# Patient Record
Sex: FEMALE | Race: WHITE | NOT HISPANIC OR LATINO | Employment: OTHER | URBAN - METROPOLITAN AREA
[De-identification: names, ages, dates, MRNs, and addresses within clinical notes are randomized per-mention and may not be internally consistent; named-entity substitution may affect disease eponyms.]

---

## 2017-01-03 ENCOUNTER — ALLSCRIPTS OFFICE VISIT (OUTPATIENT)
Dept: OTHER | Facility: OTHER | Age: 42
End: 2017-01-03

## 2017-01-13 ENCOUNTER — APPOINTMENT (OUTPATIENT)
Dept: OCCUPATIONAL THERAPY | Age: 42
End: 2017-01-13
Payer: MEDICARE

## 2017-01-13 PROCEDURE — 97010 HOT OR COLD PACKS THERAPY: CPT

## 2017-01-13 PROCEDURE — 97140 MANUAL THERAPY 1/> REGIONS: CPT

## 2017-01-13 PROCEDURE — G8990 OTHER PT/OT CURRENT STATUS: HCPCS

## 2017-01-13 PROCEDURE — G8991 OTHER PT/OT GOAL STATUS: HCPCS

## 2017-01-13 PROCEDURE — 97110 THERAPEUTIC EXERCISES: CPT

## 2017-01-16 ENCOUNTER — APPOINTMENT (OUTPATIENT)
Dept: OCCUPATIONAL THERAPY | Age: 42
End: 2017-01-16
Payer: MEDICARE

## 2017-01-17 ENCOUNTER — APPOINTMENT (OUTPATIENT)
Dept: OCCUPATIONAL THERAPY | Age: 42
End: 2017-01-17
Payer: MEDICARE

## 2017-01-18 ENCOUNTER — TRANSCRIBE ORDERS (OUTPATIENT)
Dept: LAB | Facility: HOSPITAL | Age: 42
End: 2017-01-18

## 2017-01-18 ENCOUNTER — HOSPITAL ENCOUNTER (OUTPATIENT)
Dept: RADIOLOGY | Facility: HOSPITAL | Age: 42
Discharge: HOME | End: 2017-01-18
Attending: ORTHOPAEDIC SURGERY
Payer: MEDICARE

## 2017-01-18 ENCOUNTER — ALLSCRIPTS OFFICE VISIT (OUTPATIENT)
Dept: OTHER | Facility: OTHER | Age: 42
End: 2017-01-18

## 2017-01-18 ENCOUNTER — APPOINTMENT (OUTPATIENT)
Dept: LAB | Facility: HOSPITAL | Age: 42
End: 2017-01-18
Payer: MEDICARE

## 2017-01-18 DIAGNOSIS — S62.629A CLOSED DISPLACED FRACTURE OF MIDDLE PHALANX OF FINGER: ICD-10-CM

## 2017-01-18 DIAGNOSIS — S62.615A: ICD-10-CM

## 2017-01-18 LAB
25(OH)D3 SERPL-MCNC: 31.6 NG/ML (ref 30–100)
ALBUMIN SERPL BCP-MCNC: 4.2 G/DL (ref 3.5–5)
ALP SERPL-CCNC: 63 U/L (ref 46–116)
ALT SERPL W P-5'-P-CCNC: 25 U/L (ref 12–78)
ANION GAP SERPL CALCULATED.3IONS-SCNC: 6 MMOL/L (ref 4–13)
AST SERPL W P-5'-P-CCNC: 16 U/L (ref 5–45)
BILIRUB SERPL-MCNC: 0.35 MG/DL (ref 0.2–1)
BUN SERPL-MCNC: 13 MG/DL (ref 5–25)
CALCIUM SERPL-MCNC: 9.7 MG/DL (ref 8.3–10.1)
CHLORIDE SERPL-SCNC: 104 MMOL/L (ref 100–108)
CO2 SERPL-SCNC: 32 MMOL/L (ref 21–32)
CREAT SERPL-MCNC: 1 MG/DL (ref 0.6–1.3)
GFR SERPL CREATININE-BSD FRML MDRD: >60 ML/MIN/1.73SQ M
GLUCOSE SERPL-MCNC: 100 MG/DL (ref 65–140)
POTASSIUM SERPL-SCNC: 3.8 MMOL/L (ref 3.5–5.3)
PROT SERPL-MCNC: 8 G/DL (ref 6.4–8.2)
SODIUM SERPL-SCNC: 142 MMOL/L (ref 136–145)
TSH SERPL DL<=0.05 MIU/L-ACNC: 1.66 UIU/ML (ref 0.36–3.74)

## 2017-01-18 PROCEDURE — 80053 COMPREHEN METABOLIC PANEL: CPT

## 2017-01-18 PROCEDURE — 36415 COLL VENOUS BLD VENIPUNCTURE: CPT

## 2017-01-18 PROCEDURE — 82306 VITAMIN D 25 HYDROXY: CPT

## 2017-01-18 PROCEDURE — 84443 ASSAY THYROID STIM HORMONE: CPT

## 2017-01-18 PROCEDURE — 73140 X-RAY EXAM OF FINGER(S): CPT

## 2017-02-03 ENCOUNTER — GENERIC CONVERSION - ENCOUNTER (OUTPATIENT)
Dept: OTHER | Facility: OTHER | Age: 42
End: 2017-02-03

## 2017-02-28 ENCOUNTER — ALLSCRIPTS OFFICE VISIT (OUTPATIENT)
Dept: OTHER | Facility: OTHER | Age: 42
End: 2017-02-28

## 2017-03-17 ENCOUNTER — GENERIC CONVERSION - ENCOUNTER (OUTPATIENT)
Dept: OTHER | Facility: OTHER | Age: 42
End: 2017-03-17

## 2017-03-22 ENCOUNTER — ALLSCRIPTS OFFICE VISIT (OUTPATIENT)
Dept: OTHER | Facility: OTHER | Age: 42
End: 2017-03-22

## 2017-03-22 ENCOUNTER — HOSPITAL ENCOUNTER (OUTPATIENT)
Dept: RADIOLOGY | Facility: HOSPITAL | Age: 42
Discharge: HOME | End: 2017-03-22
Attending: ORTHOPAEDIC SURGERY
Payer: MEDICARE

## 2017-03-22 ENCOUNTER — GENERIC CONVERSION - ENCOUNTER (OUTPATIENT)
Dept: OTHER | Facility: OTHER | Age: 42
End: 2017-03-22

## 2017-03-22 DIAGNOSIS — S62.629A CLOSED DISPLACED FRACTURE OF MIDDLE PHALANX OF FINGER: ICD-10-CM

## 2017-03-22 DIAGNOSIS — S62.615A: ICD-10-CM

## 2017-03-22 PROCEDURE — 73140 X-RAY EXAM OF FINGER(S): CPT

## 2017-04-19 ENCOUNTER — GENERIC CONVERSION - ENCOUNTER (OUTPATIENT)
Dept: OTHER | Facility: OTHER | Age: 42
End: 2017-04-19

## 2017-04-27 ENCOUNTER — GENERIC CONVERSION - ENCOUNTER (OUTPATIENT)
Dept: OTHER | Facility: OTHER | Age: 42
End: 2017-04-27

## 2017-04-28 ENCOUNTER — GENERIC CONVERSION - ENCOUNTER (OUTPATIENT)
Dept: OTHER | Facility: OTHER | Age: 42
End: 2017-04-28

## 2017-05-09 ENCOUNTER — ALLSCRIPTS OFFICE VISIT (OUTPATIENT)
Dept: OTHER | Facility: OTHER | Age: 42
End: 2017-05-09

## 2017-05-24 ENCOUNTER — HOSPITAL ENCOUNTER (OUTPATIENT)
Dept: RADIOLOGY | Facility: HOSPITAL | Age: 42
Discharge: HOME | End: 2017-05-24
Attending: ORTHOPAEDIC SURGERY
Payer: MEDICARE

## 2017-05-24 ENCOUNTER — ALLSCRIPTS OFFICE VISIT (OUTPATIENT)
Dept: OTHER | Facility: OTHER | Age: 42
End: 2017-05-24

## 2017-05-24 DIAGNOSIS — S62.609A CLOSED FRACTURE OF PHALANX OF FINGER: ICD-10-CM

## 2017-05-24 DIAGNOSIS — S69.92XA UNSPECIFIED INJURY OF LEFT WRIST, HAND AND FINGER(S), INITIAL ENCOUNTER: ICD-10-CM

## 2017-05-24 PROCEDURE — 73140 X-RAY EXAM OF FINGER(S): CPT

## 2017-06-13 ENCOUNTER — APPOINTMENT (OUTPATIENT)
Dept: RADIOLOGY | Age: 42
End: 2017-06-13
Attending: PHYSICIAN ASSISTANT
Payer: MEDICARE

## 2017-06-13 ENCOUNTER — OFFICE VISIT (OUTPATIENT)
Dept: URGENT CARE | Age: 42
End: 2017-06-13
Payer: MEDICARE

## 2017-06-13 ENCOUNTER — TRANSCRIBE ORDERS (OUTPATIENT)
Dept: URGENT CARE | Age: 42
End: 2017-06-13

## 2017-06-13 DIAGNOSIS — S69.92XA UNSPECIFIED INJURY OF LEFT WRIST, HAND AND FINGER(S), INITIAL ENCOUNTER: ICD-10-CM

## 2017-06-13 PROCEDURE — G0463 HOSPITAL OUTPT CLINIC VISIT: HCPCS | Performed by: FAMILY MEDICINE

## 2017-06-13 PROCEDURE — 99213 OFFICE O/P EST LOW 20 MIN: CPT | Performed by: FAMILY MEDICINE

## 2017-06-13 PROCEDURE — 73140 X-RAY EXAM OF FINGER(S): CPT

## 2017-06-26 ENCOUNTER — APPOINTMENT (OUTPATIENT)
Dept: LAB | Facility: CLINIC | Age: 42
End: 2017-06-26
Payer: MEDICARE

## 2017-06-26 ENCOUNTER — TRANSCRIBE ORDERS (OUTPATIENT)
Dept: LAB | Facility: CLINIC | Age: 42
End: 2017-06-26

## 2017-06-26 ENCOUNTER — TRANSCRIBE ORDERS (OUTPATIENT)
Dept: ADMINISTRATIVE | Facility: HOSPITAL | Age: 42
End: 2017-06-26

## 2017-06-26 ENCOUNTER — ALLSCRIPTS OFFICE VISIT (OUTPATIENT)
Dept: OTHER | Facility: OTHER | Age: 42
End: 2017-06-26

## 2017-06-26 DIAGNOSIS — K11.7 DISTURBANCE OF SALIVARY SECRETION: ICD-10-CM

## 2017-06-26 DIAGNOSIS — Z12.31 ENCOUNTER FOR SCREENING MAMMOGRAM FOR MALIGNANT NEOPLASM OF BREAST: ICD-10-CM

## 2017-06-26 DIAGNOSIS — K11.7 DISTURBANCE OF SALIVARY SECRETION: Primary | ICD-10-CM

## 2017-06-26 DIAGNOSIS — E04.2 MULTIPLE THYROID NODULES: Primary | ICD-10-CM

## 2017-06-26 LAB
ALBUMIN SERPL BCP-MCNC: 3.8 G/DL (ref 3.5–5)
ALP SERPL-CCNC: 56 U/L (ref 46–116)
ALT SERPL W P-5'-P-CCNC: 22 U/L (ref 12–78)
ANION GAP SERPL CALCULATED.3IONS-SCNC: 5 MMOL/L (ref 4–13)
AST SERPL W P-5'-P-CCNC: 14 U/L (ref 5–45)
BASOPHILS # BLD AUTO: 0.02 THOUSANDS/ΜL (ref 0–0.1)
BASOPHILS NFR BLD AUTO: 0 % (ref 0–1)
BILIRUB SERPL-MCNC: 0.44 MG/DL (ref 0.2–1)
BUN SERPL-MCNC: 13 MG/DL (ref 5–25)
CALCIUM SERPL-MCNC: 9.5 MG/DL (ref 8.3–10.1)
CHLORIDE SERPL-SCNC: 108 MMOL/L (ref 100–108)
CO2 SERPL-SCNC: 31 MMOL/L (ref 21–32)
CREAT SERPL-MCNC: 0.93 MG/DL (ref 0.6–1.3)
CRP SERPL QL: <3 MG/L
EOSINOPHIL # BLD AUTO: 0.17 THOUSAND/ΜL (ref 0–0.61)
EOSINOPHIL NFR BLD AUTO: 2 % (ref 0–6)
ERYTHROCYTE [DISTWIDTH] IN BLOOD BY AUTOMATED COUNT: 12.8 % (ref 11.6–15.1)
ERYTHROCYTE [SEDIMENTATION RATE] IN BLOOD: 4 MM/HOUR (ref 0–20)
GFR SERPL CREATININE-BSD FRML MDRD: >60 ML/MIN/1.73SQ M
GLUCOSE P FAST SERPL-MCNC: 92 MG/DL (ref 65–99)
HCT VFR BLD AUTO: 41.2 % (ref 34.8–46.1)
HGB BLD-MCNC: 13.1 G/DL (ref 11.5–15.4)
LYMPHOCYTES # BLD AUTO: 3.67 THOUSANDS/ΜL (ref 0.6–4.47)
LYMPHOCYTES NFR BLD AUTO: 38 % (ref 14–44)
MCH RBC QN AUTO: 31.3 PG (ref 26.8–34.3)
MCHC RBC AUTO-ENTMCNC: 31.8 G/DL (ref 31.4–37.4)
MCV RBC AUTO: 99 FL (ref 82–98)
MONOCYTES # BLD AUTO: 0.74 THOUSAND/ΜL (ref 0.17–1.22)
MONOCYTES NFR BLD AUTO: 8 % (ref 4–12)
NEUTROPHILS # BLD AUTO: 5.14 THOUSANDS/ΜL (ref 1.85–7.62)
NEUTS SEG NFR BLD AUTO: 52 % (ref 43–75)
NRBC BLD AUTO-RTO: 0 /100 WBCS
PLATELET # BLD AUTO: 210 THOUSANDS/UL (ref 149–390)
PMV BLD AUTO: 12.1 FL (ref 8.9–12.7)
POTASSIUM SERPL-SCNC: 4.1 MMOL/L (ref 3.5–5.3)
PROT SERPL-MCNC: 7.5 G/DL (ref 6.4–8.2)
RBC # BLD AUTO: 4.18 MILLION/UL (ref 3.81–5.12)
SODIUM SERPL-SCNC: 144 MMOL/L (ref 136–145)
TSH SERPL DL<=0.05 MIU/L-ACNC: 3.15 UIU/ML (ref 0.36–3.74)
WBC # BLD AUTO: 9.77 THOUSAND/UL (ref 4.31–10.16)

## 2017-06-26 PROCEDURE — 80053 COMPREHEN METABOLIC PANEL: CPT

## 2017-06-26 PROCEDURE — 36415 COLL VENOUS BLD VENIPUNCTURE: CPT

## 2017-06-26 PROCEDURE — 85025 COMPLETE CBC W/AUTO DIFF WBC: CPT

## 2017-06-26 PROCEDURE — 86235 NUCLEAR ANTIGEN ANTIBODY: CPT

## 2017-06-26 PROCEDURE — 86430 RHEUMATOID FACTOR TEST QUAL: CPT

## 2017-06-26 PROCEDURE — 84443 ASSAY THYROID STIM HORMONE: CPT

## 2017-06-26 PROCEDURE — 86140 C-REACTIVE PROTEIN: CPT

## 2017-06-26 PROCEDURE — 86038 ANTINUCLEAR ANTIBODIES: CPT

## 2017-06-26 PROCEDURE — 85652 RBC SED RATE AUTOMATED: CPT

## 2017-06-27 ENCOUNTER — HOSPITAL ENCOUNTER (OUTPATIENT)
Dept: RADIOLOGY | Facility: HOSPITAL | Age: 42
Discharge: HOME | End: 2017-06-27
Payer: MEDICARE

## 2017-06-27 ENCOUNTER — ALLSCRIPTS OFFICE VISIT (OUTPATIENT)
Dept: OTHER | Facility: OTHER | Age: 42
End: 2017-06-27

## 2017-06-27 DIAGNOSIS — E04.2 MULTIPLE THYROID NODULES: ICD-10-CM

## 2017-06-27 LAB
ENA SS-A AB SER-ACNC: <0.2 AI (ref 0–0.9)
ENA SS-B AB SER-ACNC: <0.2 AI (ref 0–0.9)
RHEUMATOID FACT SER QL LA: NEGATIVE

## 2017-06-27 PROCEDURE — 76536 US EXAM OF HEAD AND NECK: CPT

## 2017-06-28 LAB — RYE IGE QN: NEGATIVE

## 2017-07-26 ENCOUNTER — ALLSCRIPTS OFFICE VISIT (OUTPATIENT)
Dept: OTHER | Facility: OTHER | Age: 42
End: 2017-07-26

## 2017-07-28 DIAGNOSIS — M24.542 CONTRACTURE OF JOINT OF LEFT HAND: ICD-10-CM

## 2017-07-28 DIAGNOSIS — G89.4 CHRONIC PAIN SYNDROME: ICD-10-CM

## 2017-07-28 DIAGNOSIS — F11.20 UNCOMPLICATED OPIOID DEPENDENCE (HCC): ICD-10-CM

## 2017-07-28 DIAGNOSIS — S62.615A: ICD-10-CM

## 2017-07-28 DIAGNOSIS — Z79.899 OTHER LONG TERM (CURRENT) DRUG THERAPY: ICD-10-CM

## 2017-08-22 ENCOUNTER — ALLSCRIPTS OFFICE VISIT (OUTPATIENT)
Dept: OTHER | Facility: OTHER | Age: 42
End: 2017-08-22

## 2017-08-30 ENCOUNTER — OFFICE VISIT (OUTPATIENT)
Dept: LAB | Facility: HOSPITAL | Age: 42
End: 2017-08-30
Attending: ORTHOPAEDIC SURGERY
Payer: MEDICARE

## 2017-08-30 ENCOUNTER — HOSPITAL ENCOUNTER (OUTPATIENT)
Dept: RADIOLOGY | Facility: HOSPITAL | Age: 42
Discharge: HOME | End: 2017-08-30
Payer: MEDICARE

## 2017-08-30 ENCOUNTER — GENERIC CONVERSION - ENCOUNTER (OUTPATIENT)
Dept: OTHER | Facility: OTHER | Age: 42
End: 2017-08-30

## 2017-08-30 ENCOUNTER — TRANSCRIBE ORDERS (OUTPATIENT)
Dept: LAB | Facility: HOSPITAL | Age: 42
End: 2017-08-30

## 2017-08-30 DIAGNOSIS — M79.646 PAIN OF FINGER: ICD-10-CM

## 2017-08-30 DIAGNOSIS — T84.84XS PAIN DUE TO HIP JOINT PROSTHESIS, SEQUELA: ICD-10-CM

## 2017-08-30 DIAGNOSIS — T84.84XS PAIN DUE TO HIP JOINT PROSTHESIS, SEQUELA: Primary | ICD-10-CM

## 2017-08-30 DIAGNOSIS — T84.84XA PAIN DUE TO INTERNAL ORTHOPEDIC PROSTHETIC DEVICES, IMPLANTS AND GRAFTS, INITIAL ENCOUNTER (HCC): ICD-10-CM

## 2017-08-30 DIAGNOSIS — Z96.649 PAIN DUE TO HIP JOINT PROSTHESIS, SEQUELA: Primary | ICD-10-CM

## 2017-08-30 DIAGNOSIS — Z96.649 PAIN DUE TO HIP JOINT PROSTHESIS, SEQUELA: ICD-10-CM

## 2017-08-30 LAB
ANION GAP SERPL CALCULATED.3IONS-SCNC: 4 MMOL/L (ref 4–13)
ATRIAL RATE: 51 BPM
BASOPHILS # BLD AUTO: 0.01 THOUSANDS/ΜL (ref 0–0.1)
BASOPHILS NFR BLD AUTO: 0 % (ref 0–1)
BUN SERPL-MCNC: 14 MG/DL (ref 5–25)
CALCIUM SERPL-MCNC: 9.2 MG/DL (ref 8.3–10.1)
CHLORIDE SERPL-SCNC: 108 MMOL/L (ref 100–108)
CO2 SERPL-SCNC: 31 MMOL/L (ref 21–32)
CREAT SERPL-MCNC: 0.95 MG/DL (ref 0.6–1.3)
EOSINOPHIL # BLD AUTO: 0.17 THOUSAND/ΜL (ref 0–0.61)
EOSINOPHIL NFR BLD AUTO: 2 % (ref 0–6)
ERYTHROCYTE [DISTWIDTH] IN BLOOD BY AUTOMATED COUNT: 12.7 % (ref 11.6–15.1)
GFR SERPL CREATININE-BSD FRML MDRD: 74 ML/MIN/1.73SQ M
GLUCOSE P FAST SERPL-MCNC: 88 MG/DL (ref 65–99)
HCT VFR BLD AUTO: 38.7 % (ref 34.8–46.1)
HGB BLD-MCNC: 12.7 G/DL (ref 11.5–15.4)
LYMPHOCYTES # BLD AUTO: 3.38 THOUSANDS/ΜL (ref 0.6–4.47)
LYMPHOCYTES NFR BLD AUTO: 47 % (ref 14–44)
MCH RBC QN AUTO: 31.1 PG (ref 26.8–34.3)
MCHC RBC AUTO-ENTMCNC: 32.8 G/DL (ref 31.4–37.4)
MCV RBC AUTO: 95 FL (ref 82–98)
MONOCYTES # BLD AUTO: 0.47 THOUSAND/ΜL (ref 0.17–1.22)
MONOCYTES NFR BLD AUTO: 7 % (ref 4–12)
NEUTROPHILS # BLD AUTO: 3.15 THOUSANDS/ΜL (ref 1.85–7.62)
NEUTS SEG NFR BLD AUTO: 44 % (ref 43–75)
NRBC BLD AUTO-RTO: 0 /100 WBCS
P AXIS: -11 DEGREES
PLATELET # BLD AUTO: 211 THOUSANDS/UL (ref 149–390)
PMV BLD AUTO: 11.6 FL (ref 8.9–12.7)
POTASSIUM SERPL-SCNC: 4 MMOL/L (ref 3.5–5.3)
PR INTERVAL: 136 MS
QRS AXIS: 91 DEGREES
QRSD INTERVAL: 82 MS
QT INTERVAL: 474 MS
QTC INTERVAL: 436 MS
RBC # BLD AUTO: 4.08 MILLION/UL (ref 3.81–5.12)
SODIUM SERPL-SCNC: 143 MMOL/L (ref 136–145)
T WAVE AXIS: 67 DEGREES
VENTRICULAR RATE: 51 BPM
WBC # BLD AUTO: 7.19 THOUSAND/UL (ref 4.31–10.16)

## 2017-08-30 PROCEDURE — 73140 X-RAY EXAM OF FINGER(S): CPT

## 2017-08-30 PROCEDURE — 93005 ELECTROCARDIOGRAM TRACING: CPT

## 2017-08-30 PROCEDURE — 80048 BASIC METABOLIC PNL TOTAL CA: CPT

## 2017-08-30 PROCEDURE — 36415 COLL VENOUS BLD VENIPUNCTURE: CPT

## 2017-08-30 PROCEDURE — 85025 COMPLETE CBC W/AUTO DIFF WBC: CPT

## 2017-09-01 ENCOUNTER — TRANSCRIBE ORDERS (OUTPATIENT)
Dept: ADMINISTRATIVE | Facility: HOSPITAL | Age: 42
End: 2017-09-01

## 2017-09-01 DIAGNOSIS — Z12.31 VISIT FOR SCREENING MAMMOGRAM: Primary | ICD-10-CM

## 2017-09-27 RX ORDER — FLUTICASONE PROPIONATE 50 MCG
2 SPRAY, SUSPENSION (ML) NASAL
COMMUNITY
Start: 2016-03-08 | End: 2018-04-11 | Stop reason: SDUPTHER

## 2017-09-27 RX ORDER — BUPRENORPHINE 10 UG/H
1 PATCH TRANSDERMAL WEEKLY
COMMUNITY
Start: 2017-07-25 | End: 2018-01-24 | Stop reason: SDUPTHER

## 2017-09-27 RX ORDER — OXYCODONE HYDROCHLORIDE AND ACETAMINOPHEN 5; 325 MG/1; MG/1
1 TABLET ORAL EVERY 4 HOURS PRN
Status: ON HOLD | COMMUNITY
Start: 2016-11-15 | End: 2017-10-26 | Stop reason: ALTCHOICE

## 2017-09-27 RX ORDER — GABAPENTIN 100 MG/1
1 CAPSULE ORAL 3 TIMES DAILY
COMMUNITY
Start: 2015-06-05 | End: 2018-01-24 | Stop reason: DRUGHIGH

## 2017-09-27 RX ORDER — POLYETHYLENE GLYCOL 3350 17 G
4 POWDER IN PACKET (EA) ORAL AS NEEDED
COMMUNITY
End: 2018-09-20 | Stop reason: ALTCHOICE

## 2017-09-27 RX ORDER — ZOLPIDEM TARTRATE 10 MG/1
1 TABLET ORAL
Status: ON HOLD | COMMUNITY
End: 2017-10-26 | Stop reason: SDUPTHER

## 2017-09-27 RX ORDER — GABAPENTIN 400 MG/1
2 CAPSULE ORAL 3 TIMES DAILY
COMMUNITY
Start: 2014-06-24 | End: 2018-01-24 | Stop reason: DRUGHIGH

## 2017-09-27 RX ORDER — BACLOFEN 10 MG/1
1 TABLET ORAL 3 TIMES DAILY
COMMUNITY
Start: 2015-12-29 | End: 2018-01-24 | Stop reason: SDUPTHER

## 2017-09-27 RX ORDER — VENLAFAXINE HYDROCHLORIDE 75 MG/1
1 CAPSULE, EXTENDED RELEASE ORAL DAILY
Status: ON HOLD | COMMUNITY
End: 2017-10-26 | Stop reason: SDUPTHER

## 2017-09-27 RX ORDER — CLONAZEPAM 1 MG/1
1 TABLET ORAL 4 TIMES DAILY PRN
COMMUNITY
End: 2018-01-24

## 2017-09-27 NOTE — PRE-PROCEDURE INSTRUCTIONS
Pre-Surgery Instructions:   Medication Instructions    baclofen 10 mg tablet Patient was instructed by Physician and understands   Buprenorphine (BUTRANS) 10 MCG/HR [address] Patient was instructed by Physician and understands   clonazePAM (KlonoPIN) 1 mg tablet Patient was instructed by Physician and understands   esomeprazole (NexIUM) 40 MG capsule Instructed patient per Anesthesia Guidelines   fluticasone (FLONASE) 50 mcg/act nasal spray Patient was instructed by Physician and understands   gabapentin (NEURONTIN) 100 mg capsule Patient was instructed by Physician and understands   gabapentin (NEURONTIN) 400 mg capsule Patient was instructed by Physician and understands   nicotine polacrilex (NICOTINE MINI) 4 MG lozenge Patient was instructed by Physician and understands   oxyCODONE-acetaminophen (PERCOCET) 5-325 mg per tablet Patient was instructed by Physician and understands   venlafaxine (EFFEXOR XR) 75 mg 24 hr capsule Patient was instructed by Physician and understands   zolpidem (AMBIEN) 10 mg tablet Patient was instructed by Physician and understands  The following information was developed to assist you to prepare for your operation  What do I need to do before coming to the hospital?  · Arrange for a responsible person to drive you to and from the hospital   · Arrange care for your children at home  Children are not allowed in the recovery area of the hospital   · Plan to wear clothing that is easy to put on and take off  If you are having shoulder surgery, wear a shirt that buttons or zippers in front  Bathing  · Shower the evening before and the morning of your surgery with antibacterial soap  Please refer to the Pre Op Showering Instructions for Surgery Patient Sheet  · Remove nail polish and all body piercing jewelry  · Do not shave any body part for at least 24 hours before surgery-this includes face, arm, legs and upper body      Food  · Nothing to eat or drink after midnight the night before your surgery  This includes candy and chewing gum  Exception: If your surgery is after 12:00 pm (noon), you may have clear liquids such as 7-Up, ginger ale, apple or cranberry juice, Jello-O, water, or clear broth until 8:00 am   · Do not drink mild or juice with pulp on the morning before surgery  · Do not drink alcohol 24 hours before surgery  · Do not smoke 12 hours before surgery  Medicine  · Follow instructions you are received from your surgeon about which medicines you may take on the day of surgery  · If instructed to take medicine on the morning of surgery, take pills with just a small sip of water  Call your prescribing doctor for specific instructions on what to do if you take insulin  What should I bring to the hospital?  Bring  · Crutches or a walker, if you have them, for foot or knee surgery  · A list of the daily medications, vitamins, minerals, herbals and nutritional supplements you take  Include the dosages of medicines and the time you take them each day  · Glasses, dentures or hearing aids  · Minimal clothing; you will be wearing hospital sleepwear  · Photo ID; required to verify your identity  · If you have a Living Will or Power of , bring a copy of the documents  (only if being admitted)  · If you have an ostomy, bring an extra pouch and any supplies you use  Do Not Bring  · Medicines or Inhalers  · Money, valuables or jewelry    What other information should I know about the day of surgery? · Notify your surgeon if you develop a cold, sore throat, cough, fever, rash or any other illness  · Report to the Ambulatory Surgical/Same Day Surgery Unit  You will be instructed to stop at Registration only if you have not been pre-registered  · Inform your  if they do not stay that they will be asked by the staff to leave a phone number where they can be reached  · Be available to be reached before surgery    In the even the operating room schedule changes, you may be asked to come in earlier or later than expected  It is important to tell your doctor and others involved in your health care if you are taking or have been taking any non-prescription drugs, vitamins, minerals, herbals or other nutritional supplements  Any of these may interact with some food or medicines and cause a reaction  Before your operation, you play an important role in decreasing your risk for infection by washing with special antiseptic soap  This is an effective way to reduce bacteria on the skin which may help to prevent infections at the surgical site  Please read the following directions in advance  2  In the week before your operation purchase a 4 ounce bottle of antiseptic soap containing chlorhexidine gluconate 4%  Some brand names include: Aplicare, Endure, and Hibiclens  The cost is usually less than $5 00  · For your convenience, the [address] [place] [address] carries the soap  · It may also be available at your doctor's office or pre-admission testing center, and at most retail pharmacies  · If you are allergic or sensitive to soaps containing chlorhexidine gluconate (CHG), please let your doctor know so another antiseptic soap can be suggested  · CHG antiseptic soap is for external use only  2      The day before your operation follow these directions carefully to get ready  · Place clean lines (sheets) on your bed; you should sleep on clean sheets after your evening shower  · Get clean towels and washcloths ready - you need enough for 2 showers  · Set aside clean underwear, pajamas, and clothing to wear after the shower  Reminders:  · DO NOT use any other soap or body rinse on your skin during or after the antiseptic showers  · DO NOT use lotion , powder, deodorant, or perfume/aftershave of any kind on your skin after your antiseptic shower  · DO NOT shave any body parts in the 24 hours/the day before your operation    · DO NOT get the antiseptic soap in your eyes, ears, nose, mouth, or vaginal area  3      You will need to shower the night before AND the morning of your Surgery  Shower 1:  · The evening before your operation, take the fist shower  · First, shampoo your hair with regular shampoo and rinse it completely before you use the anitseptic soap  After washing and rinsing your hair, rinse your body  · Next, use a clean wash cloth to apply the antiseptic soap and wash your body from the neck down to your toes using 1/2 bottle of the antiseptic soap  You will use the other 1/2 bottle for the second shower  · Clean the area where your incision will be; later this area well for about 2 minutes  · If you ar having head or neck surgery, wash areas with the antiseptic soap  · Rinse yourself completely with running water  · Use a clean towel to dry off  · Wear clean underwear and clothing/pajamas  Shower 2:  · The Morning of your operation, take the second shower following the same steps as Shower 1 using the second 1/2 of the bottle of antiseptic soap  · Use clean cloths and towels to was and dry yourself off  · Wear clean underwear and clothing

## 2017-10-10 ENCOUNTER — ALLSCRIPTS OFFICE VISIT (OUTPATIENT)
Dept: OTHER | Facility: OTHER | Age: 42
End: 2017-10-10

## 2017-10-20 ENCOUNTER — ALLSCRIPTS OFFICE VISIT (OUTPATIENT)
Dept: OTHER | Facility: OTHER | Age: 42
End: 2017-10-20

## 2017-10-21 NOTE — PROGRESS NOTES
Assessment  1  Gastroenteritis (558 9) (K52 9)    Discussion/Summary    Gastroenteritis  Patient given prescription for Zofran 4 milligrams to use t i d  p r n  for nausea or vomiting  She will try a brat diet as tolerated  If bowel movements become more frequent she may use over-the-counter Imodium  Chief Complaint  Patient is here c/o nausea, diarrhea, fatigue, headache, lightheadedness and abdominal pain after eating x's 1+ wks  All medications were reviewed and updated with the patient  History of Present Illness  HPI: I reviewed chief complaint with the patient  She has had 3 loose bowel movements per day with some nausea and vomiting  She denies any documented fevers  There is no travel history or changes in diet  Patient has had difficulty eating since having her upper teeth removed in September 2017      Review of Systems    Constitutional: feeling tired  ENT: as noted in HPI  Cardiovascular: no complaints of slow or fast heart rate, no chest pain, no palpitations, no leg claudication or lower extremity edema  Respiratory: no complaints of shortness of breath, no wheezing, no dyspnea on exertion, no orthopnea or PND  Gastrointestinal: as noted in HPI  Genitourinary: no complaints of dysuria, no incontinence, no pelvic pain, no dysmenorrhea, no vaginal discharge or abnormal vaginal bleeding  Musculoskeletal: myalgias  Integumentary: no complaints of skin rash or lesion, no itching or dry skin, no skin wounds  Active Problems  1  Acid reflux disease (530 81) (K21 9)   2  Allergic rhinitis (477 9) (J30 9)   3  Analgesic use (V58 69) (Z79 899)   4  Avulsion fracture of middle phalanx of finger, closed, initial encounter (816 01)   (N35 141W)   5  Bilateral hand pain (729 5) (M79 641,M79 642)   6  Cervical pain (723 1) (M54 2)   7  Cervical radiculopathy (723 4) (M54 12)   8   Closed displaced fracture of proximal phalanx of left ring finger, initial encounter (816 01)   (D82 952E) 9  Contracture of finger joint, 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) (M24 542)   10  Eczema (692 9) (L30 9)   11  Encounter for long-term opiate analgesic use (V58 69) (Z79 891)   12  Esophageal reflux (530 81) (K21 9)   13  Finger fracture, left (816 00) (S62 609A)   14  Finger injury, left, initial encounter (959 5) (S69 92XA)   15  Finger pain (729 5) (M79 646)   16  Generalized anxiety disorder (300 02) (F41 1)   17  Hearing loss (389 9) (H91 90)   18  Impacted cerumen of right ear (380 4) (H61 21)   19  Irritable bowel syndrome (564 1) (K58 9)   20  Myofacial muscle pain (729 1) (M79 1)   21  Non-toxic uninodular goiter (241 0) (E04 1)   22  Opioid dependence (304 00) (F11 20)   23  Pain in right shoulder (719 41) (M25 511)   24  Pain of left middle finger (729 5) (M79 645)   25  Pain syndrome, chronic (338 4) (G89 4)   26  Painful orthopaedic hardware (996 78) (T84 84XA)   27  Paresthesias/numbness (782 0) (R20 9)   28  Stiffness of finger joint of left hand (719 54) (M25 642)   29  Suprascapular neuropathy (354 8) (G56 80)   30  Tendonitis Of The Left Shoulder (726 10)   31  Thyroid nodule (241 0) (E04 1)   32  Visit for screening mammogram (V76 12) (Z12 31)   33  Xerostomia due to mouth breathing (527 7,784 99) (R68 2,R06 5)    Past Medical History  1  History of Abdominal pain, LLQ (left lower quadrant) (789 04) (R10 32)   2  History of Anxiety (300 00) (F41 9)   3  History of acute sinusitis (V12 69) (Z87 09)   4  History of arthritis (V13 4) (Z87 39)   5  History of depression (V11 8) (Z86 59)   6  History of diarrhea (V12 79) (Z87 898)   7  History of dysmenorrhea (V13 29) (Z87 42)   8  History of screening mammography (V15 89) (Z92 89)    Family History  Mother    1  Family history of Esophageal Reflux   2  Family history of cerebrovascular accident (V17 1) (Z82 3)   3  Family history of Hyperlipidemia   4  Family history of Hypertension (V17 49)  Father    5   Family history of cerebrovascular accident (V17 1) (Z82 3)  Unknown    6  Family history of cerebrovascular accident (V17 1) (Z82 3)   7  Family history of colon cancer (V16 0) (Z80 0)   8  Family history of diabetes mellitus (V18 0) (Z83 3)   9  Family history of malignant neoplasm of breast (V16 3) (Z80 3)   10  Family history of skin cancer (V16 8) (Z80 8)    Social History   · Former smoker (S40 75) (X71 802)   · No alcohol use  The social history was reviewed and updated today  Surgical History  1  History of Appendectomy   2  History of Shoulder Surgery    Current Meds   1  Ambien 10 MG Oral Tablet; TAKE 1 TABLET AT  BEDTIME AS NEEDED FOR INSOMNIA; Therapy: (Recorded:08Oct2015) to Recorded   2  Baclofen 10 MG Oral Tablet; take 1 tablet 3 times daily as needed for muscle spasm; Therapy: 34SLF8932 to (486 8704)  Requested for: 66VUH0135; Last   Rx:10Oct2017 Ordered   3  Butrans 10 MCG/HR Transdermal Patch Weekly; use 1 patch weekly; Therapy: 67Iqc1075 to (Evaluate:68Otu5083); Last Rx:10Oct2017 Ordered   4  Chlorhexidine Gluconate 4 % External Liquid; USE AS DIRECTED; Therapy: 45NJD9147 to (Last Rx:06Jun2016)  Requested for: 06Jun2016 Ordered   5  ClonazePAM 1 MG Oral Tablet; 1 tab up to 4 times daily  as needed; Therapy: (Recorded:15Fep5106) to Recorded   6  [employer] Melatonin 10 MG Oral Capsule; Therapy: (78 119 58 38) to Recorded   7  Effexor XR 75 MG Oral Capsule Extended Release 24 Hour; take 1 capsule daily; Therapy: (Recorded:08Mar2016) to Recorded   8  Esomeprazole Magnesium 40 MG Oral Capsule Delayed Release; TAKE ONE   CAPSULE BY MOUTH EVERY DAY; Therapy: 16EWG6046 to (Evaluate:36Xsp4375)  Requested for: 69Dsf1300; Last   Rx:01Mar2017; Status: ACTIVE - Renewal Denied Ordered   9  Fluticasone Propionate 50 MCG/ACT Nasal Suspension; instill 2 sprays into each nostril   once daily; Therapy: 70LUG7861 to (Jaswinder Scott)  Requested for: 69EQQ0667; Last   TW:05IWJ0855 Ordered   10   Gabapentin 100 MG Oral Capsule; TAKE 1 CAPSULE 3 TIMES DAILY **IN ADDITION     MG DOSE; Therapy: 55RIV4364 to (Evaluate:08Jan2018)  Requested for: 64HFZ2386; Last    Rx:10Oct2017 Ordered   11  Gabapentin 400 MG Oral Capsule; take 2 capsules 3 times daily; Therapy: 36AGR2280 to (Evaluate:08Jan2018)  Requested for: 05NGR6374; Last    Rx:10Oct2017 Ordered   12  Polyethylene Glycol 3350 Oral Powder; MIX 17 GM IN 8 OUNCES OF LIQUID AND    DRINK 1 TO 2 TIMES DAILY FOR CONSTIPATION; Therapy: 38PTP0752 to (Anamika Mis)  Requested for: 67IIV8605; Last    Rx:10Oct2017 Ordered   13  Tegaderm Miscellaneous; USE AS DIRECTED; Therapy: 62NZP9534 to (Evaluate:59Lvg3355)  Requested for: 35KAE8978; Last    Rx:17Oct2016 Ordered   14  Triamcinolone Acetonide 0 5 % External Cream; APPLY 2-3 TIMES DAILY TO    AFFECTED AREA(S); Therapy: 27WGR4348 to (Evaluate:05Fqk5132)  Requested for: 52MAG6651; Last    Rx:06Jun2017 Ordered    The medication list was reviewed and updated today  Allergies  1  Naprosyn TABS   2  Vancomycin    Vitals   Recorded: 15DHJ7282 02:43PM   Temperature 98 1 F   Heart Rate 60   Respiration 14   Systolic 982   Diastolic 78   Height 5 ft 3 in   Weight 108 lb 2 oz   BMI Calculated 19 15   BSA Calculated 1 49     Physical Exam    Constitutional   General appearance: No acute distress, well appearing and well nourished  Ears, Nose, Mouth, and Throat   External inspection of ears and nose: Normal     Otoscopic examination: Tympanic membranes translucent with normal light reflex  Canals patent without erythema  Oropharynx: Normal with no erythema, edema, exudate or lesions  Pulmonary   Respiratory effort: No increased work of breathing or signs of respiratory distress  Auscultation of lungs: Clear to auscultation  Cardiovascular   Auscultation of heart: Normal rate and rhythm, normal S1 and S2, without murmurs  Abdomen   Abdomen: Non-tender, no masses  Liver and spleen: No hepatomegaly or splenomegaly  Lymphatic   Palpation of lymph nodes in neck: No lymphadenopathy  Skin   Skin and subcutaneous tissue: Normal without rashes or lesions  Future Appointments    Date/Time Provider Specialty Site   12/01/2017 08:30 AM CHRIS Moyer [facility] SPINE   11/03/2017 08:30 AM Kiran Inman, [facility] Orthopedic Surgery [facility] [address] [place]   10/26/2017 02:45 PM THADDEUS Waggoner   Orthopedic Surgery [address] OR     Signatures   Electronically signed by : THADDEUS Bradford ; Oct 20 6471  4:09PM EST                       (Author)

## 2017-10-23 NOTE — PROGRESS NOTES
Assessment  1  Pain syndrome, chronic (338 4) (G89 4)   2  Myofacial muscle pain (729 1) (M79 1)   3  Cervical pain (723 1) (M54 2)   4  Cervical radiculopathy (723 4) (M54 12)    Plan   Analgesic use, Opioid dependence, Pain syndrome, chronic    · (1) DRUG ABUSE SCREEN, URINE ROUTINE; Status:Active - Retrospective  Authorization; Requested for:[id number];    Perform:[facility]; HFN:[id number]; Last Updated Lashaun Sandhya; 9/59/3547 2:80:64 AM;Ordered; For:Analgesic use, Opioid dependence, Pain syndrome, chronic; Ordered By:Cordelia Ortiz;  Cervical pain, Myofacial muscle pain, Pain syndrome, chronic    · Gabapentin 100 MG Oral Capsule; TAKE 1 CAPSULE 3 TIMES DAILY **IN  ADDITION  MG DOSE   Rx By: [facility]; Dispense: 30 Days ; #:90 Capsule; Refill: 1;For: Cervical pain, Myofacial muscle pain, Pain syndrome, chronic; CAR = N; Verified Transmission to [facility] Msg to Pharmacy: in addidtion to 800mg dose; Last Updated By: System, [employer]; 8/22/2017 8:57:49 AM  Myofacial muscle pain, Pain syndrome, chronic    · Baclofen 10 MG Oral Tablet; take 1 tablet 3 times daily as needed for muscle  spasm   Rx By: Erin Oilkhoa; Dispense: 30 Days ; #:1 X 90 Tablet Bottle; Refill: 1;For: Myofacial muscle pain, Pain syndrome, chronic; CAR = N; Verified Transmission to [facility] Last Updated By: System, [employer]; 8/22/2017 8:57:50 AM  Pain in right shoulder    · Butrans 10 MCG/HR Transdermal Patch Weekly; use 1 patch weekly   Rx By: Erin Oilkhoa; Dispense: 30 Days ; #:4 Patch Weekly; Refill: 1;For: Pain in right shoulder; CAR = N; Print Rx  Pain syndrome, chronic    · Gabapentin 400 MG Oral Capsule; take 2 capsules 3 times daily   Rx By: [facility]; Dispense: 30 Days ; #:180 Capsule;  Refill: 1;For: Pain syndrome, chronic; CAR = N; Verified Transmission to [facility] Last Updated By: System, [employer]; 8/22/2017 8:57:50 AM    Follow-up visit in 2 months Evaluation and Treatment  Follow-up  Status: Hold For - Scheduling  Requested for: 20Edx5902  Ordered; For: Pain syndrome, chronic;  Ordered By: Jennifer Lacy  Performed:   Due: 80CRC5979     Discussion/Summary    This is a 58-year-old female patient percent C office for follow-up visit today  The patient is currently being treated for chronic pain syndrome, myofascial muscle pain, cervical pain and cervical radiculopathy  The patient reports that she does have neck and shoulder pain that is more significant on the right  The patient reports that she has been using the Butrans 10 ?g/hour transdermal patch weekly and that this is very helpful in reducing her pain symptoms  The patient denies any adverse side effects from this medication at this time except that she occasionally experiences constipation  The patient reports that she will use the polyethylene glycol powder for this and that this helps her have a bowel movement  The patient reports that she does use baclofen 10 mg 3 times a day for her myofascial pain and muscle spasm  The patient reports that she also takes gabapentin 900 mg 3 times a day for her neuropathic and radicular pain complaints  She denies any adverse side effects from the gabapentin and states that this medication is extremely effective in helping to reduce her pain symptoms  plan for this patient is as follows:I will renew this patient's speech and 10 mcg/h transdermal patch weekly and did provide this patient with one refill on her prescription  [address] prescription drug monitoring program and found it to be appropriate for what is being prescribed I reviewed for any inconsistencies or evidence of multiple prescribers or drug diversion  A copy of the patient's report can be found in the patients' chart  risks of opioid medications, including dependence, addiction and tolerance are explained to the patient   The patient understands and agrees to use these medications only as prescribed  I fully discussed the potential side effects of the medication with the patient, which include, but are not limited to, constipation, drowsiness, addiction, impaired judgment and risk of fetal overdose if not taken as prescribed  I have warned the patient ensuring medications as a felony  I warned against driving while taking sedating medications  At this point in time the patient is showing no signs of addiction, abuse, diversion or suicidal ideation  urine drug screen was collected at today's office visit  The urine drug screen will be sent for confirmatory testing  The drug test is medically necessary because the patient is either dependent on opioid medication or being considered for opioid medication therapy in the results could impact ongoing her future treatment  The drug test is to evaluate for the presence or absence of prescribed, nonprescribed, and or illicit drugs/substances  I will renew this patient's baclofen 10 mg 3 times a day for her myofascial pain and muscle spasm  I will renew this patient's gabapentin 100 mg 1 capsule 3 times a day and her gabapentin 400 mg ?2 capsules 3 times a day for a total of 900 mg 3 times a day for her neuropathic and radicular pain complaints  The patient should continue to use her polyethylene glycol 3350 oral powder 17 g in 8 ounces of liquid and drink 1-2 times a day daily for her constipation  The patient should continue with her home exercise program   I will follow-up with this patient in 8 weeks  The patient has the current Goals: To provide this patient adequate pain relief to increase overall level of functioning and quality of life  The patent has the current Barriers: Chronic pain syndrome  Chronic opioid use  Patient is able to Self-Care  Possible side effects of new medications were reviewed with the patient/guardian today     The patient was counseled regarding instructions for management,-- risk factor reductions,-- prognosis,-- patient and family education,-- impressions,-- risks and benefits of treatment options-- and-- importance of compliance with treatment  total time of encounter was 25 minutes  Chief Complaint  1  Pain  Neck and shoulder pain  History of Present Illness  This is a 50-year-old female patient percent C office for follow-up visit today  The patient is currently being treated for chronic pain syndrome, myofascial muscle pain, cervical pain and cervical radiculopathy  The patient reports that she does have neck and shoulder pain that is more significant on the right  The patient reports that she has been using the Butrans 10 ?g/hour transdermal patch weekly and that this is very helpful in reducing her pain symptoms  The patient denies any adverse side effects from this medication at this time except that she occasionally experiences constipation  The patient reports that she will use the polyethylene glycol powder for this and that this helps her have a bowel movement  The patient reports that she does use baclofen 10 mg 3 times a day for her myofascial pain and muscle spasm  The patient reports that she also takes gabapentin 900 mg 3 times a day for her neuropathic and radicular pain complaints  She denies any adverse side effects from the gabapentin and states that this medication is extremely effective in helping to reduce her pain symptoms  patient reports that her pain is the same as her previous visit rates her pain as 6/10 on the numerical pain rating scale  The patient states her pain is worse in the night and that her pain is constant, intermittent and occasional and that her pain is described as burning, dull aching, throbbing, cramping, pressure like, shooting, numbness, and pins and needles-like in nature   The patient reports that the amount of pain relief that she is obtaining now with her current education regimen and treatment plan is enough to make a real difference in her life  The patient reports that 60% of her pain is being relieved with her current medication regimen treatment plan   have personally reviewed and/or updated the patient's past medical history, past surgical history, family history, social history, allergies, and vital signs today  Other than as stated above, the patient denies any interval changes in medications, medical condition, mental condition, symptoms, or allergies since last office visit  Vince Trejo presents with complaints of constant episodes of bilateral neck pain, described as dull, aching, burning, throbbing and cramping, radiating to the bilateral shoulder  On a scale of 1 to 10, the patient rates the pain as 6  Symptoms are unchanged  Review of Systems    Constitutional: no fever,-- no recent weight gain-- and-- no recent weight loss  Eyes: no double vision-- and-- no blurry vision  Cardiovascular: no chest pain,-- no palpitations-- and-- no lower extremity edema  Respiratory: no complaints of shortness of breath-- and-- no wheezing  Musculoskeletal: muscle weakness,-- joint stiffness-- and-- decreased range of motion, but-- no difficulty walking,-- no joint swelling,-- no limb swelling-- and-- no pain in extremity  Neurological: no dizziness,-- no difficulty swallowing,-- no memory loss,-- no loss of consciousness-- and-- no seizures  Gastrointestinal: no nausea,-- no vomiting,-- no constipation-- and-- no diarrhea  Genitourinary: no difficulty initiating urine stream,-- no genital pain-- and-- no frequent urination  Integumentary: no complaints of skin rash  Psychiatric: no depression  Endocrine: no excessive thirst,-- no adrenal disease,-- no hypothyroidism-- and-- no hyperthyroidism  Hematologic/Lymphatic: no tendency for easy bruising-- and-- no tendency for easy bleeding  ROS reviewed  Active Problems  1  Acid reflux disease (750 81) (K21 9)   2  Allergic rhinitis (207 9) (J30 9)   3   Analgesic use (V58 69) (Z79 899)   4  Avulsion fracture of middle phalanx of finger, closed, initial encounter (816 01)   (S62 629A)   5  Bilateral hand pain (729 5) (M79 641,M79 642)   6  Cervical pain (723 1) (M54 2)   7  Cervical radiculopathy (723 4) (M54 12)   8  Closed displaced fracture of proximal phalanx of left ring finger, initial encounter (816 01)   (S62 615A)   9  Contracture of finger joint, 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) (M24 542)   10  Eczema (692 9) (L30 9)   11  Encounter for long-term opiate analgesic use (V58 69) (Z79 891)   12  Esophageal reflux (530 81) (K21 9)   13  Finger fracture, left (816 00) (S62 609A)   14  Finger injury, left, initial encounter (959 5) (S69 92XA)   15  Generalized anxiety disorder (300 02) (F41 1)   16  Hearing loss (389 9) (H91 90)   17  Impacted cerumen of right ear (380 4) (H61 21)   18  Irritable bowel syndrome (564 1) (K58 9)   19  Myofacial muscle pain (729 1) (M79 1)   20  Non-toxic uninodular goiter (241 0) (E04 1)   21  Opioid dependence (304 00) (F11 20)   22  Pain in right shoulder (719 41) (M25 511)   23  Pain of left middle finger (729 5) (M79 645)   24  Pain syndrome, chronic (338 4) (G89 4)   25  Painful orthopaedic hardware (996 78) (T84 84XA)   26  Paresthesias/numbness (782 0) (R20 9)   27  Stiffness of finger joint of left hand (719 54) (M25 642)   28  Suprascapular neuropathy (354 8) (G56 80)   29  Tendonitis Of The Left Shoulder (726 10)   30  Thyroid nodule (241 0) (E04 1)   31  Visit for screening mammogram (V76 12) (Z12 31)   32  Xerostomia due to mouth breathing (527 7,784 99) (R68 2,R06 5)    Past Medical History  1  History of Abdominal pain, LLQ (left lower quadrant) (789 04) (R10 32)   2  History of Anxiety (300 00) (F41 9)   3  History of acute sinusitis (V12 69) (Z87 09)   4  History of arthritis (V13 4) (Z87 39)   5  History of depression (V11 8) (Z86 59)   6  History of diarrhea (V12 79) (Z87 898)   7  History of dysmenorrhea (V13 29) (Z87 42)   8   History of screening mammography (V15 89) (Z92 89)    The active problems and past medical history were reviewed and updated today  Surgical History  1  History of Appendectomy   2  History of Shoulder Surgery    The surgical history was reviewed and updated today  Family History  Mother    1  Family history of Esophageal Reflux   2  Family history of cerebrovascular accident (V17 1) (Z82 3)   3  Family history of Hyperlipidemia   4  Family history of Hypertension (V17 49)  Father    5  Family history of cerebrovascular accident (V17 1) (Z82 3)  Unknown    6  Family history of cerebrovascular accident (V17 1) (Z82 3)   7  Family history of colon cancer (V16 0) (Z80 0)   8  Family history of diabetes mellitus (V18 0) (Z83 3)   9  Family history of malignant neoplasm of breast (V16 3) (Z80 3)   10  Family history of skin cancer (V16 8) (Z80 8)    The family history was reviewed and updated today  Social History   · Former smoker (H10 62) (H14 308)   · No alcohol use  The social history was reviewed and updated today  The social history was reviewed and is unchanged  Current Meds   1  Ambien 10 MG Oral Tablet; TAKE 1 TABLET AT  BEDTIME AS NEEDED FOR INSOMNIA; Therapy: (Recorded:08Oct2015) to Recorded   2  Baclofen 10 MG Oral Tablet; take 1 tablet 3 times daily as needed for muscle spasm; Therapy: 89FJX3174 to (Evaluate:07Hfl4108)  Requested for: 27Jun2017; Last   QI:59ZBF3734 Ordered   3  Butrans 10 MCG/HR Transdermal Patch Weekly; use 1 patch weekly; Therapy: 80Erz3012 to (Evaluate:25Swi2497); Last GX:53LZG7868 Ordered   4  Chlorhexidine Gluconate 4 % External Liquid; USE AS DIRECTED; Therapy: 87WEH2303 to (Last Rx:06Jun2016)  Requested for: 06Jun2016 Ordered   5  ClonazePAM 1 MG Oral Tablet; 1 tab up to 4 times daily  as needed; Therapy: (Recorded:56Wgd2737) to Recorded   6  [employer] Melatonin 10 MG Oral Capsule; Therapy: [phone]) to Recorded   7   Debrox 6 5 % Otic Solution; READ AND FOLLOW 'S INSTRUCTIONS   ON BOX; Therapy: 72WSJ7432 to (Last ZE:69IGY1150)  Requested for: 26Jun2017 Ordered   8  Effexor XR 75 MG Oral Capsule Extended Release 24 Hour; take 1 capsule daily; Therapy: (Recorded:08Mar2016) to Recorded   9  Esomeprazole Magnesium 40 MG Oral Capsule Delayed Release; TAKE ONE CAPSULE   BY MOUTH EVERY DAY; Therapy: 28RAL3398 to (Evaluate:35Zrr3242)  Requested for: 21XVI1802; Last   Rx:01Mar2017 Ordered   10  Fluticasone Propionate 50 MCG/ACT Nasal Suspension; instill 2 sprays into each nostril    once daily; Therapy: 05IUL1430 to ([phone])  Requested for: 90KWQ5572; Last    OI:79WKT6682 Ordered   11  Gabapentin 100 MG Oral Capsule; TAKE 1 CAPSULE 3 TIMES DAILY **IN ADDITION TO    800 MG DOSE; Therapy: 55BXL7476 to (Evaluate:23Hku3905)  Requested for: 03BCH4296; Last    OV:84HZC6400 Ordered   12  Gabapentin 400 MG Oral Capsule; take 2 capsules 3 times daily; Therapy: 37JAI7346 to (Evaluate:31Bjt5442)  Requested for: 48ADX6809; Last    ZX:31STG7722 Ordered   13  Polyethylene Glycol 3350 Oral Powder; MIX 17 GM IN 8 OUNCES OF LIQUID AND DRINK    1 TO 2 TIMES DAILY FOR CONSTIPATION; Therapy: 40SDT2292 to (Evaluate:23Gwo6161)  Requested for: 20UPH3611; Last    Rx:06Yng1176 Ordered   14  Tegaderm Miscellaneous; USE AS DIRECTED; Therapy: 42ENR2364 to (Evaluate:06Mxy9015)  Requested for: 18DFX1135; Last    Rx:17Oct2016 Ordered   15  Triamcinolone Acetonide 0 5 % External Cream; APPLY 2-3 TIMES DAILY TO AFFECTED    AREA(S); Therapy: 81NAC1986 to (Evaluate:17Jpj0300)  Requested for: 17OWH0134; Last    Rx:06Jun2017 Ordered    The medication list was reviewed and updated today  Allergies  1  Naprosyn TABS   2   Vancomycin    Vitals  Vital Signs    Recorded: 22Aug2017 08:27AM   Temperature 99 3 F   Heart Rate 59   Systolic 659   Diastolic 65   Height 5 ft 3 in   Weight 109 lb 6 oz   BMI Calculated 19 37   BSA Calculated 1 5   Pain Scale 6 Physical Exam    Constitutional   General appearance: Well developed, well nourished, alert, in no distress, non-toxic and no overt pain behavior  Eyes   Sclera: anicteric   HEENT   Hearing grossly intact  Neck   Neck: Supple, symmetric, trachea midline, no masses  Pulmonary   Respiratory effort: Even and unlabored  Cardiovascular   Examination of extremities: No edema or pitting edema present  Abdomen   Abdomen: Soft, non-tender, non-distended  Skin   Skin and subcutaneous tissue: Normal without rashes or lesions, well hydrated  Psychiatric   Mood and affect: Mood and affect appropriate  Neurologic   Cranial nerves: Cranial nerves II-XII grossly intact  the muscle tone was normal   Musculoskeletal   Gait and station: Abnormal  -- Antalgic  Cervical Spine examination demonstrates  5/5 upper tremor strength in all muscle groups bilaterally  Bilateral shoulders tender to palpation  Significant muscle spasms noted in the bilateral trapezii and cervical paraspinals  Results/Data  Procedure Flowsheet 53Fmr7500 08:29AM      Test Name Result Flag Reference   Urine Drug Screen Performed Date 22Aug2017         Future Appointments    Date/Time Provider Specialty Site   10/10/2017 08:30 AM [facility]     Signatures   Electronically signed by : Lety Blue;  Aug 22 2017  9:45AM EST                       (Author)    Electronically signed by : THADDEUS Cody ; Aug 22 2017 12:12PM EST                       (Author)

## 2017-10-26 ENCOUNTER — ANESTHESIA (OUTPATIENT)
Dept: PERIOP | Facility: HOSPITAL | Age: 42
End: 2017-10-26
Payer: MEDICARE

## 2017-10-26 ENCOUNTER — ANESTHESIA EVENT (OUTPATIENT)
Dept: PERIOP | Facility: HOSPITAL | Age: 42
End: 2017-10-26
Payer: MEDICARE

## 2017-10-26 ENCOUNTER — HOSPITAL ENCOUNTER (OUTPATIENT)
Facility: HOSPITAL | Age: 42
Setting detail: OUTPATIENT SURGERY
Discharge: HOME | End: 2017-10-26
Attending: ORTHOPAEDIC SURGERY | Admitting: ORTHOPAEDIC SURGERY
Payer: MEDICARE

## 2017-10-26 VITALS
HEIGHT: 63 IN | TEMPERATURE: 97.6 F | BODY MASS INDEX: 19.31 KG/M2 | RESPIRATION RATE: 16 BRPM | HEART RATE: 65 BPM | SYSTOLIC BLOOD PRESSURE: 99 MMHG | WEIGHT: 109 LBS | DIASTOLIC BLOOD PRESSURE: 58 MMHG | OXYGEN SATURATION: 100 %

## 2017-10-26 LAB — EXT PREGNANCY TEST URINE: NEGATIVE

## 2017-10-26 PROCEDURE — 81025 URINE PREGNANCY TEST: CPT | Performed by: ORTHOPAEDIC SURGERY

## 2017-10-26 RX ORDER — HYDROCODONE BITARTRATE AND ACETAMINOPHEN 5; 325 MG/1; MG/1
1 TABLET ORAL EVERY 6 HOURS PRN
Qty: 20 TABLET | Refills: 0 | Status: SHIPPED | OUTPATIENT
Start: 2017-10-26 | End: 2018-01-24

## 2017-10-26 RX ORDER — GLYCOPYRROLATE 0.2 MG/ML
INJECTION INTRAMUSCULAR; INTRAVENOUS AS NEEDED
Status: DISCONTINUED | OUTPATIENT
Start: 2017-10-26 | End: 2017-10-26 | Stop reason: SURG

## 2017-10-26 RX ORDER — OXYCODONE HYDROCHLORIDE AND ACETAMINOPHEN 5; 325 MG/1; MG/1
1 TABLET ORAL EVERY 4 HOURS PRN
Qty: 10 TABLET | Refills: 0 | Status: SHIPPED | OUTPATIENT
Start: 2017-10-26 | End: 2017-11-05

## 2017-10-26 RX ORDER — FENTANYL CITRATE/PF 50 MCG/ML
25 SYRINGE (ML) INJECTION
Status: DISCONTINUED | OUTPATIENT
Start: 2017-10-26 | End: 2017-10-26 | Stop reason: HOSPADM

## 2017-10-26 RX ORDER — LIDOCAINE HYDROCHLORIDE AND EPINEPHRINE 10; 10 MG/ML; UG/ML
INJECTION, SOLUTION INFILTRATION; PERINEURAL AS NEEDED
Status: DISCONTINUED | OUTPATIENT
Start: 2017-10-26 | End: 2017-10-26 | Stop reason: HOSPADM

## 2017-10-26 RX ORDER — HYDROCODONE BITARTRATE AND ACETAMINOPHEN 5; 325 MG/1; MG/1
1 TABLET ORAL EVERY 6 HOURS PRN
Status: DISCONTINUED | OUTPATIENT
Start: 2017-10-26 | End: 2017-10-26

## 2017-10-26 RX ORDER — PROPOFOL 10 MG/ML
INJECTION, EMULSION INTRAVENOUS AS NEEDED
Status: DISCONTINUED | OUTPATIENT
Start: 2017-10-26 | End: 2017-10-26 | Stop reason: SURG

## 2017-10-26 RX ORDER — SODIUM CHLORIDE, SODIUM LACTATE, POTASSIUM CHLORIDE, CALCIUM CHLORIDE 600; 310; 30; 20 MG/100ML; MG/100ML; MG/100ML; MG/100ML
20 INJECTION, SOLUTION INTRAVENOUS CONTINUOUS
Status: DISCONTINUED | OUTPATIENT
Start: 2017-10-26 | End: 2017-10-26 | Stop reason: HOSPADM

## 2017-10-26 RX ORDER — FENTANYL CITRATE 50 UG/ML
INJECTION, SOLUTION INTRAMUSCULAR; INTRAVENOUS AS NEEDED
Status: DISCONTINUED | OUTPATIENT
Start: 2017-10-26 | End: 2017-10-26 | Stop reason: SURG

## 2017-10-26 RX ORDER — MIDAZOLAM HYDROCHLORIDE 1 MG/ML
INJECTION INTRAMUSCULAR; INTRAVENOUS AS NEEDED
Status: DISCONTINUED | OUTPATIENT
Start: 2017-10-26 | End: 2017-10-26 | Stop reason: SURG

## 2017-10-26 RX ORDER — OXYCODONE HYDROCHLORIDE AND ACETAMINOPHEN 5; 325 MG/1; MG/1
1 TABLET ORAL EVERY 4 HOURS PRN
Status: DISCONTINUED | OUTPATIENT
Start: 2017-10-26 | End: 2017-10-26 | Stop reason: HOSPADM

## 2017-10-26 RX ADMIN — MIDAZOLAM HYDROCHLORIDE 2 MG: 1 INJECTION, SOLUTION INTRAMUSCULAR; INTRAVENOUS at 12:50

## 2017-10-26 RX ADMIN — FENTANYL CITRATE 50 MCG: 50 INJECTION, SOLUTION INTRAMUSCULAR; INTRAVENOUS at 12:50

## 2017-10-26 RX ADMIN — PROPOFOL 30 MG: 10 INJECTION, EMULSION INTRAVENOUS at 13:20

## 2017-10-26 RX ADMIN — PROPOFOL 50 MG: 10 INJECTION, EMULSION INTRAVENOUS at 13:00

## 2017-10-26 RX ADMIN — FENTANYL CITRATE 50 MCG: 50 INJECTION, SOLUTION INTRAMUSCULAR; INTRAVENOUS at 13:00

## 2017-10-26 RX ADMIN — SODIUM CHLORIDE, SODIUM LACTATE, POTASSIUM CHLORIDE, AND CALCIUM CHLORIDE: .6; .31; .03; .02 INJECTION, SOLUTION INTRAVENOUS at 12:53

## 2017-10-26 RX ADMIN — PROPOFOL 30 MG: 10 INJECTION, EMULSION INTRAVENOUS at 13:25

## 2017-10-26 RX ADMIN — CEFAZOLIN SODIUM 1000 MG: 1 SOLUTION INTRAVENOUS at 12:53

## 2017-10-26 RX ADMIN — GLYCOPYRROLATE 0.2 MG: 0.2 INJECTION, SOLUTION INTRAMUSCULAR; INTRAVENOUS at 12:50

## 2017-10-26 RX ADMIN — PROPOFOL 70 MG: 10 INJECTION, EMULSION INTRAVENOUS at 12:55

## 2017-10-26 RX ADMIN — PROPOFOL 30 MG: 10 INJECTION, EMULSION INTRAVENOUS at 13:15

## 2017-10-26 RX ADMIN — SODIUM CHLORIDE, SODIUM LACTATE, POTASSIUM CHLORIDE, AND CALCIUM CHLORIDE 20 ML/HR: .6; .31; .03; .02 INJECTION, SOLUTION INTRAVENOUS at 12:15

## 2017-10-26 NOTE — DISCHARGE INSTRUCTIONS
Post Operative Instructions    You have had surgery on your arm today, please read and follow the information below:  Elevate your hand above your elbow during the next 24-48 hours to help with swelling  Place your hand and arm over your head with motion at your shoulder three times a day  Do not apply any cream/ointment/oil to your incisions including antibiotics  Do not soak your hands in standing water (dishwater, tubs, Jacuzzi's, pools, etc ) until given permission (typically 2-3 weeks after injury)    Call the office if you notice any:  Increased numbness or tingling of your hand or fingers that is not relieved with elevation  Increasing pain that is not controlled with medication  Difficulty chewing, breathing, swallowing  Chest pains or shortness of breath  Fever over 101 4 degrees  Bandage: Remove bandage after 5 days  Motion: Move fingers into a fist 5 times a day, DO NOT move any splinted fingers  Weight bearing status: Avoid heavy lifting with the extremity that was operated on until follow up appointment  Normal activities of daily living are OK  Ice:  You may apply ice to year hand for 10 minutes every hour for 24 hours  Sling: No sling necessary  Pain medication: Norco/Hydrocodone 1 tab every 6 hours as needed for pain  Follow-up Appointment: 7-10 days  Please call the office if you have any questions or concerns regarding your post-operative care

## 2017-10-26 NOTE — ANESTHESIA PREPROCEDURE EVALUATION
Review of Systems/Medical History  Patient summary reviewed  Chart reviewed      Cardiovascular  EKG reviewed, Negative cardio ROS Exercise tolerance: poor,     Pulmonary  Negative pulmonary ROS ,        GI/Hepatic    GERD well controlled,             Endo/Other  History of thyroid disease , Arthritis     GYN  Negative gynecology ROS          Hematology  Negative hematology ROS      Musculoskeletal  Osteoarthritis,        Neurology    Neuromuscular disease , Paresthesias,    Psychology   Anxiety, Depression ,   Chronic opioid dependence         Physical Exam    Airway    Mallampati score: I  TM Distance: >3 FB  Neck ROM: full     Dental   lower dentures,     Cardiovascular  Comment: Negative ROS, Rhythm: regular, Rate: normal, Cardiovascular exam normal    Pulmonary  Pulmonary exam normal Breath sounds clear to auscultation,     Other Findings        Anesthesia Plan  ASA Score- 3       Anesthesia Type- general and IV sedation with anesthesia with ASA Monitors  Additional Monitors:   Airway Plan: LMA  Comment: B/L UE neuromuscular d/o, + RUE paresthesias, Myofascial pain  Stable GERD, no symptoms  Had TIVA in past   Poss TIVA v GA with LMA  R/B/A d/w pt          Induction- intravenous  Informed Consent- Anesthetic plan and risks discussed with patient  I personally reviewed this patient with the CRNA  Discussed and agreed on the Anesthesia Plan with the PANCHITO Velasquez

## 2017-10-26 NOTE — OP NOTE
OPERATIVE REPORT  PATIENT NAME: Mariangel Castellanos  :  1975  MRN: [id number]  Pt Location: [facility] MAIN OR    SURGERY DATE: 10/26/17    Surgeon(s) and Role:     * Toby Bryan MD - Primary    Pre-Op Diagnosis:  Pain due to internal orthopedic prosthetic devices, implants and grafts, initial encounter [T84 84XA]    Post-Op Diagnosis Codes:     * Pain due to internal orthopedic prosthetic devices, implants and grafts, [facility] Caroline Cassette    Procedure(s):  RING FINGER HARDWARE REMOVAL (Left)    Specimen(s):  * No order type specified *    Estimated Blood Loss:   0 mL      Anesthesia Type:   General    Operative Indications: The patient has a history of painful orthopedic hardware is that was recalcitrant to conservative management  The decision was made to bring the patient to the operating room for hardware removal   Risks of the procedure were explained which include, but are not limited to bleeding; infection; damage to nerves, arteries,veins, tendons; scar; pain; need for reoperation; failure to give desired result; and risks of anaesthesia  All questions were answered to satisfaction and they were willing to proceed  Operative Findings:  Healed fracture of her left hand ring finger middle phalanx    Complications:   None    Procedure and Technique:  After the patient, site, and procedure were identified, the patient was brought into the operating room in a supine position  Local anaesthesia and sedation were provided  A tourniquet was not used  The  left upper extremity was then prepped and drapped in a normal, sterile, orthopedic fashion  After the patient, site, and procedure were identified attention was turned towards the left hand  The previous incision was then opened in longitudinal fashion while we maintained hemostasis  We dissected down through the skin and subcutaneous tissues  We identified the plate localized over the base of the middle phalanx    The 2 screws and plate were removed from the dorsal aspect of the middle phalanx in its entirety without complication  Intraoperative fluoroscopic imaging confirmed complete removal   At this point, the wound was copiously irrigated with sterile saline solution and the deep tendinous layer was repaired utilizing Monocryl suture  At the completion of the procedure, hemostasis was obtained with cautery and direct pressure  The wounds were copiously irrigated with sterile solution  The wounds were closed with Monocryl and Histocryl  Sterile dressings were applied, including Gauze and Finger Dressing  Please note, all sponge, needle, and instrument counts were correct prior to closure  Loupe magnification was utilized  The patient tolerated the procedure well       I was present for the entire procedure and A qualified resident physician was not available    Patient Disposition:  PACU  and hemodynamically stable    SIGNATURE: Edith Laird MD  DATE: 10/26/17  TIME: 1:41 PM

## 2017-10-26 NOTE — ANESTHESIA POSTPROCEDURE EVALUATION
Post-Op Assessment Note      CV Status:  Stable    Mental Status:  Alert    Hydration Status:  Stable    PONV Controlled:  None    Airway Patency:  Patent    Post Op Vitals Reviewed: Yes          Staff: CRNA           BP      Temp      Pulse    Resp      SpO2

## 2017-10-26 NOTE — H&P
H&P Exam - Orthopedics   James Sylvester 36 y o  female MRN: [id number]  Unit/Bed#: OR POOL    Assessment/Plan   Assessment:  Unstable hardware, Left Ring finger  Plan:  Left Ring finger hardware removal    History of Present Illness   HPI:  Sumeet Godfrey is a 36 y o  y o  female who presents with unstable hardware in Left ring finger    Historical Information  Review Of Systems:   · Skin: Normal  · Neuro: See HPI  · Musculoskeletal: See HPI  · 14 point review of systems negative except as stated above     Past Medical History:   Past Medical History:   Diagnosis Date    Anxiety     Arthritis     Right shoulder    Chronic pain disorder     Bilateral shoulders    Depression     Thyroid nodule     left side       Past Surgical History:   Past Surgical History:   Procedure Laterality Date    APPENDECTOMY      DENTAL SURGERY      Dental implants    ORIF FINGER FRACTURE Left 6/7/2016    Procedure: RING FINGER PROXIMAL PHALANX FRACTURE O/R I/F ;  Surgeon: Stephany Bartlett MD;  [facility];  Service:     AR OPEN TX PHALANGEAL SHAFT FRACTURE PROX/MIDDLE EA Left 10/12/2016    Procedure: RING FINGER MIDDLE PHALANX FRACTURE OPEN REDUCTION W/ INTERNAL FIXATION, AND CONTRACTURE RELEASE OF PIP JOINT ;  Surgeon: Stephany Bartlett MD;  Location: [facility];  Service: Orthopedics    SHOULDER SURGERY      bialt    WISDOM TOOTH EXTRACTION Bilateral        Family History:  Family history reviewed and non-contributory  Family History   Problem Relation Age of Onset    Hypertension Mother     Asthma Mother     GI problems Mother     No Known Problems Father     No Known Problems Brother     No Known Problems Brother        Social History:  Social History     Social History    Marital status: Single     Spouse name: N/A    Number of children: N/A    Years of education: N/A     Social History Main Topics    Smoking status: Former Smoker     Years: 5 00    Smokeless tobacco: Never Used      Comment: Quit 3 years ago   Alcohol use No    Drug use: No    Sexual activity: Not Asked     Other Topics Concern    None     Social History Narrative    None       Allergies: Allergies   Allergen Reactions    Naproxen GI Intolerance    Vancomycin Hives and Itching           Labs:    0  Lab Value Date/Time   HCT 38 7 08/30/2017 1045   HCT 41 2 06/26/2017 0920   HCT 40 7 10/24/2016 1320   HCT 38 7 02/21/2014 1200   HGB 12 7 08/30/2017 1045   HGB 13 1 06/26/2017 0920   HGB 13 5 10/24/2016 1320   HGB 12 4 02/21/2014 1200   WBC 7 19 08/30/2017 1045   WBC 9 77 06/26/2017 0920   WBC 8 05 10/24/2016 1320   WBC 6 26 02/21/2014 1200   ESR 4 06/26/2017 0920   CRP <3 0 06/26/2017 0920       Meds:    Current Facility-Administered Medications:     ceFAZolin (ANCEF) IVPB (premix) 1,000 mg, 1,000 mg, Intravenous, 30 min pre-procedure, Primitivo Keane MD    lactated ringers infusion, 20 mL/hr, Intravenous, Continuous, Primitivo MD Lakhwinder, Last Rate: 20 mL/hr at 10/26/17 1215, 20 mL/hr at 10/26/17 1215    Blood Culture:   No results found for: BLOODCX    Wound Culture:   Lab Results   Component Value Date    WOUNDCULT Few Colonies of Mixed Skin Peyton 10/24/2016    WOUNDCULT Growth in Broth culture only Enterococcus species 10/24/2016       Ins and Outs:  No intake/output data recorded  Physical Exam  /85   Pulse (!) 53   Temp 99 °F (37 2 °C) (Temporal)   Resp 18   Ht 5' 3" (1 6 m)   Wt 49 4 kg (109 lb)   SpO2 99%   BMI 19 31 kg/m²   Gen: Alert and oriented to person, place, time  HEENT: EOMI, eyes clear, moist mucus membranes, hearing intact  Respiratory: Bilateral chest rise   No audible wheezing found  Cardiovascular: Regular Rate and Rhythm  Abdomen: soft nontender/nondistended  Ortho Exam:  Unstable left ring finger hardware, pain with motion, minimal swelling  Neuro Exam: sensation intact    Lab Results: Reviewed  Imaging: Reviewed

## 2017-10-31 NOTE — PROGRESS NOTES
Assessment  1  Pain syndrome, chronic (338 4) (G89 4)   2  Myofacial muscle pain (729 1) (M79 1)   3  Cervical pain (723 1) (M54 2)   4  Cervical radiculopathy (723 4) (M54 12)    Plan  Analgesic use    · Polyethylene Glycol 3350 Oral Powder; MIX 17 GM IN 8 OUNCES OF LIQUID  AND DRINK 1 TO 2 TIMES DAILY FOR CONSTIPATION   Rx By: Junior Liu; Dispense: 30 Days ; #:1 X 255 GM Bottle; Refill: 1;For: Analgesic use; CAR = N; Verified Transmission to [facility]/PH[facility]Y #[id number] Last Updated By: System, [employer]Sc[employer]Sk[employer]ap; 10/10/2017 9:09:15 AM  Cervical pain, Myofacial muscle pain, Pain syndrome, chronic    · Gabapentin 100 MG Oral Capsule; TAKE 1 CAPSULE 3 TIMES DAILY **IN  ADDITION  MG DOSE   Rx By: Junior Liu; Dispense: 30 Days ; #:90 Capsule; Refill: 2;For: Cervical pain, Myofacial muscle pain, Pain syndrome, chronic; CAR = N; Verified Transmission to [facility]/PHARMACY #[id number] Last Updated By: System, SureScript[employer]; 10/10/2017 9:09:14 AM  Myofacial muscle pain, Pain syndrome, chronic    · Baclofen 10 MG Oral Tablet; take 1 tablet 3 times daily as needed for muscle  spasm   Rx By: Junior Liu; Dispense: 30 Days ; #:1 X 90 Tablet Bottle; Refill: 2;For: Myofacial muscle pain, Pain syndrome, chronic; CAR = N; Verified Transmission to [facility]/PHARMACY #[id number] Last Updated By: System, SureScripts; 10/10/2017 9:09:14 AM  Pain in right shoulder    · Butrans 10 MCG/HR Transdermal Patch Weekly; use 1 patch weekly   Rx By: Junior Liu; Dispense: 30 Days ; #:4 Patch Weekly; Refill: 1;For: Pain in right shoulder; CAR = N; Print Rx  Pain syndrome, chronic    · Gabapentin 400 MG Oral Capsule; take 2 capsules 3 times daily   Rx By: Junior Liu; Dispense: 30 Days ; #:180 Capsule;  Refill: 2;For: Pain syndrome, chronic; CAR = N; Verified Transmission to [facility]/PHARMACY #[id number] Last Updated By: System, SureScripts; 10/10/2017 9:09:15 AM    Discussion/Summary    This is a 79-year-old female patient that presents to the office for follow-up visit today  The patient is currently being treated for chronic pain syndrome, myofascial muscle pain, cervical pain and cervical radiculopathy  The patient reports that she does have neck and shoulder pain that is more significant on the right  The patient reports that she has been using the Butrans 10 Âµg/hour transdermal patch weekly and that this is very helpful in reducing her pain symptoms  The patient denies any adverse side effects from this medication at this time except that she occasionally experiences constipation  The patient reports that she will use the polyethylene glycol powder for this and that this helps her have a bowel movement  The patient reports that she does use baclofen 10 mg 3 times a day for her myofascial pain and muscle spasm  The patient reports that she also takes gabapentin 900 mg 3 times a day for her neuropathic and radicular pain complaints  She denies any adverse side effects from the gabapentin and states that this medication is extremely effective in helping to reduce her pain symptoms  The patient reports that she did recently have an emergent dental extractions surgeryand was provided a prescription for Percocet by her dentist  She reports that she is no longer taking Percocet and that it was only for postoperative pain  plan for this patient is as follows:I will renew this patient's Butrans 10 mcg/h transdermal weekly patch for her pain symptoms  I did provide this patient with a prescription for this medication with one refill while she was in the office today  risks of opioid medications, including dependence, addiction and tolerance are explained to the patient  The patient understands and agrees to use these medications only as prescribed  I fully discussed the potential side effects of the medication with the patient, which include, but are not limited to, constipation, drowsiness, addiction, impaired judgment and risk of fetal overdose if not taken as prescribed   I have warned the patient ensuring medications as a felony  I warned against driving while taking sedating medications  At this point in time the patient is showing no signs of addiction, abuse, diversion or suicidal ideation  [address] prescription drug monitoring program and found it to be appropriate for what is being prescribed I reviewed for any inconsistencies or evidence of multiple prescribers or drug diversion  A copy of the patient's report can be found in the patients' chart  The patient reports that the Butrans patch is very effective at decreasing her pain symptoms  effects: The patient reports that the only adverse side effects that she has from the patch isn't occasional constipation that is alleviated with the polyethylene glycol powder  of daily living: The patient reports that this medication is helpful in allowing her to engage in her activities of daily living which includes walking her two dogs daily and engaging in exercise  behavior: The patient displays no indication of aberrant drug behavior at this time  I will renew this patient's gabapentin 400 mg Ã2 capsules 3 times a day and gabapentin 100 mg 3 times a day for a total of 900 mg 3 times a day for her neuropathic and radicular pain complaints  The patient reports that this medication is effective and denies any adverse side effects from this medication at this time  will renew this patient's baclofen 10 mg 3 times a day as needed for her myofascial pain and muscle spasm  I did have a discussion with this patient regarding her Percocet for her dental work that she had done  I just reminded this patient that if she is ever in an nonemergent situation and she does need pain medication that she should contact our office as she does have an opioid contract with us  The patient was agreeable and verbalized understanding    The patient should continue with her home exercise program   I will follow-up with this patient in 2 months  The patient has the current Goals: To provide this patient with adequate pain relief to improve quality of life and level of functioning  The patent has the current Barriers: Chronic pain syndrome  Chronic opioid use  Opioid dependence and tolerance  Patient is able to Self-Care  The treatment plan was reviewed with the patient/guardian  The patient/guardian understands and agrees with the treatment plan   The patient was counseled regarding instructions for management,-- risk factor reductions,-- prognosis,-- patient and family education,-- impressions,-- risks and benefits of treatment options-- and-- importance of compliance with treatment  total time of encounter was 25 minutes  Chief Complaint  1  Neck Pain  neck and shoulder pain  History of Present Illness  This is a 18-year-old female patient that presents to the office for follow-up visit today  The patient is currently being treated for chronic pain syndrome, myofascial muscle pain, cervical pain and cervical radiculopathy  The patient reports that she does have neck and shoulder pain that is more significant on the right  The patient reports that she has been using the Butrans 10 Âµg/hour transdermal patch weekly and that this is very helpful in reducing her pain symptoms  The patient denies any adverse side effects from this medication at this time except that she occasionally experiences constipation  The patient reports that she will use the polyethylene glycol powder for this and that this helps her have a bowel movement  The patient reports that she does use baclofen 10 mg 3 times a day for her myofascial pain and muscle spasm  The patient reports that she also takes gabapentin 900 mg 3 times a day for her neuropathic and radicular pain complaints  She denies any adverse side effects from the gabapentin and states that this medication is extremely effective in helping to reduce her pain symptoms   The patient reports that she did recently have an emergent dental extractions surgeryand was provided a prescription for Percocet by her dentist  She reports that she is no longer taking Percocet and that it was only for postoperative pain  patient reports that her pain is the same as her previous visit rates her pain as 6/10 on the numerical pain rating scale  The patient reports that her pain is worse in the evening and night and that her pain is constant, intermittent, and occasional  The patient describes her pain as a burning, dull aching, sharp, throbbing, cramping, pressure like, shooting, numbness, and pins and needles-like in nature  The patient reports that the amount of pain relief that she is obtaining now with her current medication regimen and treatment plan is enough to make a real difference in her life  The patient reports that 60% of her pain is being alleviated with her current medication regimen and treatment plan   have personally reviewed and/or updated the patient's past medical history, past surgical history, family history, social history, allergies, and vital signs today  Other than as stated above, the patient denies any interval changes in medications, medical condition, mental condition, symptoms, or allergies since last office visit  Lynn Jackson presents with complaints of gradual onset of constant episodes of moderate bilateral posterior neck pain, described as sharp, dull, aching, burning, stinging and throbbing, radiating to the bilateral trapezius and bilateral shoulder  On a scale of 1 to 10, the patient rates the pain as 6  Symptoms are improved by heat, but not by NSAIDs  Symptoms are made worse by right arm use and neck movement  Symptoms are unchanged  Review of Systems    Constitutional: no fever,-- no recent weight gain-- and-- no recent weight loss  Eyes: no double vision-- and-- no blurry vision     Cardiovascular: no chest pain,-- no palpitations-- and-- no lower extremity edema  Respiratory: no complaints of shortness of breath-- and-- no wheezing  Musculoskeletal: no difficulty walking,-- no muscle weakness,-- no joint stiffness,-- no joint swelling,-- no limb swelling,-- no pain in extremity-- and-- no decreased range of motion  Neurological: no dizziness,-- no difficulty swallowing,-- no memory loss,-- no loss of consciousness-- and-- no seizures  Gastrointestinal: no nausea,-- no vomiting,-- no constipation-- and-- no diarrhea  Genitourinary: no difficulty initiating urine stream,-- no genital pain-- and-- no frequent urination  Integumentary: no complaints of skin rash  Psychiatric: no depression  Endocrine: no excessive thirst,-- no adrenal disease,-- no hypothyroidism-- and-- no hyperthyroidism  Hematologic/Lymphatic: no tendency for easy bruising-- and-- no tendency for easy bleeding  ROS reviewed  Active Problems  1  Acid reflux disease (530 81) (K21 9)   2  Allergic rhinitis (477 9) (J30 9)   3  Analgesic use (V58 69) (Z79 899)   4  Avulsion fracture of middle phalanx of finger, closed, initial encounter (816 01)   (S62 629A)   5  Bilateral hand pain (729 5) (M79 641,M79 642)   6  Cervical pain (723 1) (M54 2)   7  Cervical radiculopathy (723 4) (M54 12)   8  Closed displaced fracture of proximal phalanx of left ring finger, initial encounter (816 01)   (S62 615A)   9  Contracture of finger joint, 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) (M24 542)   10  Eczema (692 9) (L30 9)   11  Encounter for long-term opiate analgesic use (V58 69) (Z79 891)   12  Esophageal reflux (530 81) (K21 9)   13  Finger fracture, left (816 00) (S62 609A)   14  Finger injury, left, initial encounter (959 5) (S69 92XA)   15  Finger pain (729 5) (M79 646)   16  Generalized anxiety disorder (300 02) (F41 1)   17  Hearing loss (389 9) (H91 90)   18  Impacted cerumen of right ear (380 4) (H61 21)   19  Irritable bowel syndrome (564 1) (K58 9)   20  Myofacial muscle pain (729 1) (M79 1)   21  Non-toxic uninodular goiter (241 0) (E04 1)   22  Opioid dependence (304 00) (F11 20)   23  Pain in right shoulder (719 41) (M25 511)   24  Pain of left middle finger (729 5) (M79 645)   25  Pain syndrome, chronic (338 4) (G89 4)   26  Painful orthopaedic hardware (996 78) (T84 84XA)   27  Paresthesias/numbness (782 0) (R20 9)   28  Stiffness of finger joint of left hand (719 54) (M25 642)   29  Suprascapular neuropathy (354 8) (G56 80)   30  Tendonitis Of The Left Shoulder (726 10)   31  Thyroid nodule (241 0) (E04 1)   32  Visit for screening mammogram (V76 12) (Z12 31)   33  Xerostomia due to mouth breathing (527 7,784 99) (R68 2,R06 5)    Past Medical History  1  History of Abdominal pain, LLQ (left lower quadrant) (789 04) (R10 32)   2  History of Anxiety (300 00) (F41 9)   3  History of acute sinusitis (V12 69) (Z87 09)   4  History of arthritis (V13 4) (Z87 39)   5  History of depression (V11 8) (Z86 59)   6  History of diarrhea (V12 79) (Z87 898)   7  History of dysmenorrhea (V13 29) (Z87 42)   8  History of screening mammography (V15 89) (Z92 89)    The active problems and past medical history were reviewed and updated today  Surgical History  1  History of Appendectomy   2  History of Shoulder Surgery    The surgical history was reviewed and updated today  Family History  Mother    1  Family history of Esophageal Reflux   2  Family history of cerebrovascular accident (V17 1) (Z82 3)   3  Family history of Hyperlipidemia   4  Family history of Hypertension (V17 49)  Father    5  Family history of cerebrovascular accident (V17 1) (Z82 3)  Unknown    6  Family history of cerebrovascular accident (V17 1) (Z82 3)   7  Family history of colon cancer (V16 0) (Z80 0)   8  Family history of diabetes mellitus (V18 0) (Z83 3)   9  Family history of malignant neoplasm of breast (V16 3) (Z80 3)   10  Family history of skin cancer (V16 8) (Z80 8)    The family history was reviewed and updated today  Social History   · Former smoker (V48 46) (Q10 841)   · No alcohol use  The social history was reviewed and updated today  The social history was reviewed and is unchanged  Current Meds   1  Ambien 10 MG Oral Tablet; TAKE 1 TABLET AT  BEDTIME AS NEEDED FOR INSOMNIA; Therapy: (Recorded:08Oct2015) to Recorded   2  Baclofen 10 MG Oral Tablet; take 1 tablet 3 times daily as needed for muscle spasm; Therapy: 92CLQ5530 to [phone])  Requested for: 46Pjh2622; Last   Rx:22Aug2017 Ordered   3  Butrans 10 MCG/HR Transdermal Patch Weekly; use 1 patch weekly; Therapy: 31Hss0741 to [phone]); Last Rx:22Aug2017 Ordered   4  Chlorhexidine Gluconate 4 % External Liquid; USE AS DIRECTED; Therapy: 10TPA7269 to (Last Rx:06Jun2016)  Requested for: 06Jun2016 Ordered   5  ClonazePAM 1 MG Oral Tablet; 1 tab up to 4 times daily  as needed; Therapy: (Recorded:34Poj8348) to Recorded   6  [employer] Melatonin 10 MG Oral Capsule; Therapy: [phone]) to Recorded   7  Effexor XR 75 MG Oral Capsule Extended Release 24 Hour; take 1 capsule daily; Therapy: (Recorded:08Mar2016) to Recorded   8  Esomeprazole Magnesium 40 MG Oral Capsule Delayed Release; TAKE ONE CAPSULE   BY MOUTH EVERY DAY; Therapy: 58ULB5766 to (Evaluate:88Cry8806)  Requested for: 99Ceb0393; Last   Rx:01Mar2017; Status: ACTIVE - Renewal Denied Ordered   9  Fluticasone Propionate 50 MCG/ACT Nasal Suspension; instill 2 sprays into each nostril   once daily; Therapy: 63TZZ9368 to ([phone])  Requested for: 99NUX1107; Last   AM:30MEN8035 Ordered   10  Gabapentin 100 MG Oral Capsule; TAKE 1 CAPSULE 3 TIMES DAILY **IN ADDITION TO    800 MG DOSE; Therapy: 55JEM9529 to [phone])  Requested for: 39Ivj4100; Last    Rx:22Aug2017 Ordered   11  Gabapentin 400 MG Oral Capsule; take 2 capsules 3 times daily; Therapy: 23PAL2309 to [phone])  Requested for: 58Pxo8020; Last    Rx:63Shd6339 Ordered   12  Polyethylene Glycol 3350 Oral Powder; MIX 17 GM IN 8 OUNCES OF LIQUID AND DRINK    1 TO 2 TIMES DAILY FOR CONSTIPATION; Therapy: 18GFF2209 to (Evaluate:08Axj4259)  Requested for: 59XCN1822; Last    Rx:86Lbl1283 Ordered   13  Tegaderm Miscellaneous; USE AS DIRECTED; Therapy: 59NJH8097 to (Evaluate:80Snr3549)  Requested for: 31IXO8141; Last    Rx:17Oct2016 Ordered   14  Triamcinolone Acetonide 0 5 % External Cream; APPLY 2-3 TIMES DAILY TO AFFECTED    AREA(S); Therapy: 70EIW9780 to (Evaluate:66Xah4823)  Requested for: 85DSY1667; Last    Rx:06Jun2017 Ordered    The medication list was reviewed and updated today  Allergies   1  Naprosyn TABS    Vancomycin     Vitals  Vital Signs    Recorded: 05QWE3773 08:45AM   Temperature 98 3 F   Heart Rate 60   Systolic 233   Diastolic 73   Height 5 ft 3 in   Weight 107 lb    BMI Calculated 18 95   BSA Calculated 1 48   Pain Scale 6     Physical Exam    Constitutional   General appearance: Well developed, well nourished, alert, in no distress, non-toxic and no overt pain behavior  Eyes   Sclera: anicteric   HEENT   Hearing grossly intact  Neck   Neck: Supple, symmetric, trachea midline, no masses  Pulmonary   Respiratory effort: Even and unlabored  Cardiovascular   Examination of extremities: No edema or pitting edema present  Abdomen   Abdomen: Soft, non-tender, non-distended  Skin   Skin and subcutaneous tissue: Normal without rashes or lesions, well hydrated  Psychiatric   Mood and affect: Mood and affect appropriate  Neurologic   Cranial nerves: Cranial nerves II-XII grossly intact  the muscle tone was normal   Musculoskeletal   Gait and station: Normal     Cervical Spine examination demonstrates  5/5 upper extremity strength in all muscle groups bilaterally  Negative Spurling's maneuver bilaterally  Cervical paraspinals and bilateral trapezius muscles tender to palpation with muscle spasms noted        Results/Data  Results Free Text Form [facility]:   Results     MRI:  * MRI CERVICAL SPINE WO CONTRAST Final  Documents Attached  Order Number: [id number]         This is a summary report  The complete report is available in the patient's medical record  If you cannot access the medical record, please contact the sending organization for a detailed fax or copy  MRI CERVICAL SPINE WITHOUT CONTRAST    INDICATION: Chronic right-sided neck and shoulder pain  Numbness and paresthesias of the right arm and hand  COMPARISON: 10/7/2008  TECHNIQUE: Sagittal T1, sagittal T2, sagittal inversion recovery, axial T2, axial gradient    IMAGE QUALITY: Diagnostic    FINDINGS:    ALIGNMENT: Normal alignment of the cervical spine  No compression fracture  No subluxation  No scoliosis  MARROW SIGNAL: Type I endplate marrow edema noted within the C6-7 endplates, degenerative in nature  CERVICAL AND VISUALIZED THORACIC CORD: Normal signal within the visualized cord  PREVERTEBRAL AND PARASPINAL SOFT TISSUES: Prevertebral and paraspinal soft tissues are unremarkable  VISUALIZED POSTERIOR FOSSA: The visualized posterior fossa demonstrates no abnormal signal     CERVICAL DISC SPACES:    C2-C3: Normal     C3-C4: Normal     C4-C5: Slight loss of disc height  Previously seen No disc herniation, canal stenosis or foraminal narrowing  Slight improvement compared to the prior exam     C5-C6: No disc herniation, canal stenosis or foraminal narrowing  C6-C7: Disc desiccation with slight annular bulging  No focal disc herniation or canal stenosis  No foraminal narrowing  C7-T1: Normal     UPPER THORACIC DISC SPACES: Normal       IMPRESSION:    Mild cervical degenerative disc disease  No disc herniation, canal stenosis or foraminal narrowing        Workstation performed: [id number]    Signed by:  Jojo Samaniego DO  3/14/16      by: Lenton Mohs Collected/Examined: 10FNG8539 08:50AM by: Jerrica Montes 01:32PM Communication: No patient communication needed at this time; Final Reviewed by: Dank Stewart [id number] 06:36PM Resulted: 70GVA4065 03:28PM Last Updated: 98QYX7594 01:32PM Accession: [id number]        Future Appointments    Date/Time Provider Specialty Site   11/03/2017 08:30 AM Kali Altman, [facility] [address] [place]   11/29/2017 08:15 AM CHRIS Miranda [facility] SPINE     Signatures   Electronically signed by : Cheryl Kaur; Oct 10 2017  9:27AM EST                       (Author)    Electronically signed by : Yobani Godfrey DO; Oct 30 2017  7:04AM EST

## 2017-11-03 ENCOUNTER — ALLSCRIPTS OFFICE VISIT (OUTPATIENT)
Dept: OTHER | Facility: OTHER | Age: 42
End: 2017-11-03

## 2017-11-03 DIAGNOSIS — Z98.890 OTHER SPECIFIED POSTPROCEDURAL STATES: ICD-10-CM

## 2017-11-07 NOTE — PROGRESS NOTES
Assessment  1  Aftercare following surgery of the musculoskeletal system (V58 78) (Z74 15)   2  History of removal of retained hardware (V45 89) (Z98 890)    Plan   · *1 - [facility] Referral Co-Management  Formal therapy 2-3x/wk to work on  finger ROM s/p ZHANNA  Pt with 5lb weight lifting restriction  Please work on both ROM and  scar tissue prevention  Pt does have continued flexion contracture at PIP joint  Please  include HEP  Thank you  Status: Active  Requested for: 85WLA8908  Care Summary provided  : Yes   · Follow-up visit in 5 weeks Evaluation and Treatment  Follow-up  Status: Hold For -  Scheduling  Requested for: 95JZT6436   · Continue with our present treatment plan ; Status:Complete;   Done: 68JTP2804    Discussion/Summary    Patient overall is doing well status post removal of hardware from her left ring finger  She does still have residual flexion contracture at the PIP joint which is expected  Her range of motion has already improved compared to preoperatively  We will have her enroll in formal therapy to continue working on this  5 lb weight lifting restriction  Follow up in 4-6 weeks with x-rays at that time to confirm healing of previous screw holes  Chief Complaint  1  Finger Problem    Post-Op  Post-Op UE:   Left side, status post ring finger ZHANNA  on 10/26/17   HPI: The patient reports swelling-- and-- stiffness, but-- no fevers,-- no chills,-- no excessive pain-- and-- no nausea  Pt states she believes her finger is improved with both flexion and extension  She still has some ttp dorsally around the incision and describes some tingling towards the tip of the finger  She did at one point have what sounds like an allergic reaction along the area of her hand only, took benadryl and this has resolved  PE: The surgical incision site was healed-- and-- clean, dry and intact   The surgical incision site demonstrates no warmth,-- no induration,-- no erythema,-- no ecchymosis-- and-- swelling--   mild edema  ROM is as expected and progressing well  Pt currently sitting at approximately 30 degrees of flexion at the PIP joint  She is able to flex down and touch the tip of this finger to the base of her palm  2+ radial artery pulse on the operative side  Capillary refill is < 2 seconds Peripheral neurovascular exam reveals SILT, but-- sensation intact-- and-- motor intact  Assessment: Post-op, the patient is doing well,-- has excellent pain control-- and-- no signs of infection  Preoperative symptoms improved  Plan:   Done this visit: remove sutures/staples  Active Problems  1  Acid reflux disease (530 81) (K21 9)   2  Allergic rhinitis (477 9) (J30 9)   3  Analgesic use (V58 69) (Z79 899)   4  Avulsion fracture of middle phalanx of finger, closed, initial encounter (816 01)   (S62 629A)   5  Bilateral hand pain (729 5) (M79 641,M79 642)   6  Cervical pain (723 1) (M54 2)   7  Cervical radiculopathy (723 4) (M54 12)   8  Closed displaced fracture of proximal phalanx of left ring finger, initial encounter (816 01)   (S62 615A)   9  Contracture of finger joint, 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) (M24 542)   10  Eczema (692 9) (L30 9)   11  Encounter for long-term opiate analgesic use (V58 69) (Z79 891)   12  Esophageal reflux (530 81) (K21 9)   13  Finger fracture, left (816 00) (S62 609A)   14  Finger injury, left, initial encounter (959 5) (S69 92XA)   15  Finger pain (729 5) (M79 646)   16  Gastroenteritis (558 9) (K52 9)   17  Generalized anxiety disorder (300 02) (F41 1)   18  Hearing loss (389 9) (H91 90)   19  Impacted cerumen of right ear (380 4) (H61 21)   20  Irritable bowel syndrome (564 1) (K58 9)   21  Myofacial muscle pain (729 1) (M79 1)   22  Non-toxic uninodular goiter (241 0) (E04 1)   23  Opioid dependence (304 00) (F11 20)   24  Pain in right shoulder (719 41) (M25 511)   25  Pain of left middle finger (729 5) (M79 145)   26  Pain syndrome, chronic (338 4) (G89 4)   27   Painful orthopaedic hardware (996 78) (T84 84XA)   28  Paresthesias/numbness (782 0) (R20 9)   29  Stiffness of finger joint of left hand (719 54) (M25 642)   30  Suprascapular neuropathy (354 8) (G56 80)   31  Tendonitis Of The Left Shoulder (726 10)   32  Thyroid nodule (241 0) (E04 1)   33  Visit for screening mammogram (V76 12) (Z12 31)   34  Xerostomia due to mouth breathing (527 7,784 99) (R68 2,R06 5)    Current Meds   1  Ambien 10 MG Oral Tablet; TAKE 1 TABLET AT  BEDTIME AS NEEDED FOR INSOMNIA; Therapy: (Recorded:08Oct2015) to Recorded   2  Baclofen 10 MG Oral Tablet; take 1 tablet 3 times daily as needed for muscle spasm; Therapy: 28HFD9950 to ([phone])  Requested for: 67JVR3132; Last   Rx:10Oct2017 Ordered   3  Butrans 10 MCG/HR Transdermal Patch Weekly; use 1 patch weekly; Therapy: 29Apr2016 to (Evaluate:19Djb4897); Last Rx:10Oct2017 Ordered   4  Chlorhexidine Gluconate 4 % External Liquid; USE AS DIRECTED; Therapy: 06WPR9142 to (Last Rx:06Jun2016)  Requested for: 06Jun2016 Ordered   5  ClonazePAM 1 MG Oral Tablet; 1 tab up to 4 times daily  as needed; Therapy: (Recorded:97Ojp5378) to Recorded   6  Effexor XR 75 MG Oral Capsule Extended Release 24 Hour; take 1 capsule daily; Therapy: (Recorded:08Mar2016) to Recorded   7  Esomeprazole Magnesium 40 MG Oral Capsule Delayed Release; TAKE ONE   CAPSULE BY MOUTH EVERY DAY; Therapy: 24VEA3219 to (Evaluate:10Qky4030)  Requested for: 39Pxo3526; Last   Rx:01Mar2017; Status: ACTIVE - Renewal Denied Ordered   8  Fluticasone Propionate 50 MCG/ACT Nasal Suspension; instill 2 sprays into each nostril   once daily; Therapy: 88FSY8091 to (Linell Grounds)  Requested for: 40DNB3862; Last   AH:51RZW1479 Ordered   9  Gabapentin 100 MG Oral Capsule; TAKE 1 CAPSULE 3 TIMES DAILY **IN ADDITION    MG DOSE; Therapy: 95PYO0839 to ([phone])  Requested for: 47BFU2060; Last   Rx:10Oct2017 Ordered   10   Gabapentin 400 MG Oral Capsule; take 2 capsules 3 times daily; Therapy: 97KCI6711 to (Evaluate:08Jan2018)  Requested for: 14TVM4102; Last    Rx:10Oct2017 Ordered   11  Polyethylene Glycol 3350 Oral Powder; MIX 17 GM IN 8 OUNCES OF LIQUID AND    DRINK 1 TO 2 TIMES DAILY FOR CONSTIPATION; Therapy: 36PXE9636 to (Esau Simmons)  Requested for: 16SJG6468; Last    Rx:10Oct2017 Ordered   12  Tegaderm Miscellaneous; USE AS DIRECTED; Therapy: 86EXF1357 to (Evaluate:93Vts8086)  Requested for: 32CQU6034; Last    Rx:17Oct2016 Ordered   13  Triamcinolone Acetonide 0 5 % External Cream; APPLY 2-3 TIMES DAILY TO    AFFECTED AREA(S); Therapy: 37GGI7936 to (Evaluate:57Ult5397)  Requested for: 26QSG3845; Last    Rx:06Jun2017 Ordered    Allergies  1  Naprosyn TABS   2   Vancomycin    Vitals  Signs   Heart Rate: 58  Systolic: 802  Diastolic: 67  Height: 5 ft 3 in  Weight: 106 lb 6 oz  BMI Calculated: 18 84  BSA Calculated: 1 48    Future Appointments    Date/Time Provider Specialty Site   11/29/2017 08:15 AM Jolene Cos, CRNP Pain Management [facility]     Signatures   Electronically signed by : Santos Sheriff, [facility]; Nov  3 2017  9:14AM EST                       (Author)    Electronically signed by : THADDEUS Cardozo ; Nov 6 2017  8:33AM EST

## 2017-11-14 ENCOUNTER — APPOINTMENT (OUTPATIENT)
Dept: OCCUPATIONAL THERAPY | Age: 42
End: 2017-11-14
Attending: ORTHOPAEDIC SURGERY
Payer: MEDICARE

## 2017-11-14 DIAGNOSIS — Z98.890 OTHER SPECIFIED POSTPROCEDURAL STATES: ICD-10-CM

## 2017-11-14 PROCEDURE — G8991 OTHER PT/OT GOAL STATUS: HCPCS

## 2017-11-14 PROCEDURE — G8990 OTHER PT/OT CURRENT STATUS: HCPCS

## 2017-11-14 PROCEDURE — 97165 OT EVAL LOW COMPLEX 30 MIN: CPT

## 2017-11-21 ENCOUNTER — APPOINTMENT (OUTPATIENT)
Dept: OCCUPATIONAL THERAPY | Age: 42
End: 2017-11-21
Attending: ORTHOPAEDIC SURGERY
Payer: MEDICARE

## 2017-11-21 PROCEDURE — 97018 PARAFFIN BATH THERAPY: CPT

## 2017-11-21 PROCEDURE — 97140 MANUAL THERAPY 1/> REGIONS: CPT

## 2017-11-22 ENCOUNTER — APPOINTMENT (OUTPATIENT)
Dept: OCCUPATIONAL THERAPY | Age: 42
End: 2017-11-22
Attending: ORTHOPAEDIC SURGERY
Payer: MEDICARE

## 2017-11-28 ENCOUNTER — APPOINTMENT (OUTPATIENT)
Dept: OCCUPATIONAL THERAPY | Age: 42
End: 2017-11-28
Attending: ORTHOPAEDIC SURGERY
Payer: MEDICARE

## 2017-11-28 PROCEDURE — 97140 MANUAL THERAPY 1/> REGIONS: CPT

## 2017-11-28 PROCEDURE — 97110 THERAPEUTIC EXERCISES: CPT

## 2017-11-28 PROCEDURE — 97018 PARAFFIN BATH THERAPY: CPT

## 2017-11-29 ENCOUNTER — ALLSCRIPTS OFFICE VISIT (OUTPATIENT)
Dept: OTHER | Facility: OTHER | Age: 42
End: 2017-11-29

## 2017-11-30 ENCOUNTER — APPOINTMENT (OUTPATIENT)
Dept: OCCUPATIONAL THERAPY | Age: 42
End: 2017-11-30
Attending: ORTHOPAEDIC SURGERY
Payer: MEDICARE

## 2017-12-04 ENCOUNTER — APPOINTMENT (OUTPATIENT)
Dept: OCCUPATIONAL THERAPY | Age: 42
End: 2017-12-04
Attending: ORTHOPAEDIC SURGERY
Payer: MEDICARE

## 2017-12-05 NOTE — PROGRESS NOTES
Assessment    1  Chronic cervical pain (723 1,338 29) (M54 2,G89 29)   2  Cervical radiculopathy (723 4) (M54 12)   3  Pain syndrome, chronic (338 4) (G89 4)   4  Chronic myofascial pain (729 1,338 29) (M79 1,G89 29)    Plan   Analgesic use    · Polyethylene Glycol 3350 Oral Powder; MIX 17 GM IN 8 OUNCES OF LIQUID  AND DRINK 1 TO 2 TIMES DAILY FOR CONSTIPATION   Rx By: David Armando; Dispense: 30 Days ; #:1 X 255 GM Bottle; Refill: 1; For: Analgesic use; CAR = N; Verified Transmission to [facility]/PHARMACY #[id number] Last Updated By: Maricruz Adler; 11/29/2017 9:01:59 AM  Cervical radiculopathy, Chronic cervical pain, Chronic myofascial pain    · Lyrica 75 MG Oral Capsule; Take 1 pill daily x 7 days, then take 1 pill 2 x daily  x 7 days, then 1 pill 3 x daily x 7 days, then 1 pill 4 x daily   Rx By: Maricruz Adler; Dispense: 30 Days ; #:120 Capsule; Refill: 1; For: Cervical radiculopathy, Chronic cervical pain, Chronic myofascial pain; CAR = N; Print Rx  Chronic cervical pain, Chronic myofascial pain, Pain syndrome, chronic    · Gabapentin 100 MG Oral Capsule   Rx By: David Armando; Dispense: 30 Days ; #:90 Capsule; Refill: 2; For: Chronic cervical pain, Chronic myofascial pain, Pain syndrome, chronic; CAR = N; Sent To: [facility]/PHARMACY #[id number] Last Updated By: Maricruz Adler; 11/29/2017 9:01:43 AM  Chronic myofascial pain, Pain syndrome, chronic    · Baclofen 10 MG Oral Tablet; take 1 tablet 3 times daily as needed for muscle  spasm   Rx By: Maricruz Adler; Dispense: 30 Days ; #:1 X 90 Tablet Bottle; Refill: 1; For: Chronic myofascial pain, Pain syndrome, chronic; CAR = N; Rx auto-faxed to [facility]/PHARMACY #[id number] Last Updated By: System, [employer]; 11/29/2017 9:02:39 AM  Pain in right shoulder    · Butrans 10 MCG/HR Transdermal Patch Weekly; use 1 patch weekly   Rx By: Maricruz Adler; Dispense: 30 Days ; #:4 Patch Weekly;  Refill: 0; For: Pain in right shoulder; CAR = N; Print Rx  Pain syndrome, chronic    · From  Gabapentin 400 MG Oral Capsule take 2 capsules 3 times daily To  Gabapentin 400 MG Oral Capsule Take 2 pills 2 x daily x 7 days, then take 2  pills 1 x daily x 7 days, then STOP   Rx By: Davi Laughlin; Dispense: 30 Days ; #:180 Capsule; Refill: 2; For: Pain syndrome, chronic; CAR = N; Record  Unlinked    · Lyrica 75 MG Oral Capsule   CAR = N; Administered by: Davi Laughlin CRNP: 11/29/2017 12:00:00 AM; Last Updated By: Ginny Mata; 16/35/0385 6:67:01 AM    Follow-up visit in 2 months Evaluation and Treatment  Follow-up  Status: Hold For - Scheduling  Requested for: 44IJU8761  Ordered; For: Chronic cervical pain;  Ordered By: Davi Laughlin  Performed:   Due: 42BGA4132     Discussion/Summary    While the patient was in the office today, I did have a thorough conversation with the patient regarding her medication regimen treatment plan  At this point time since her EMG was normal and there is no significant underlying etiology noted in her MRI of the cervical spine, I do not feels in her best long interest to be on the Butrans patch chronically, however, for now, we will continue the Butrans patch as prescribed, with the hopes of finding a better alternative medication regimen and treatment plan the provides the same if not better relief, allowing us to least decrease the patch by 50%, if not completely titrate her off of it in the future  The patient was agreeable and verbalized an understanding  I explained to the patient at this point time I do feel there is definitely a significant neuropathic and myofascial component to her pain symptoms and since she is on a more than therapeutic dose of gabapentin, I feel that it may be in her best interest to titrate off of the gabapentin and on to Lyrica titrating her up to approximately 300 mg a day slowly over the next several weeks as I feel the Lyrica may be more beneficial provide better overall relief than the gabapentin   I advised the patient that if they experience any side effects or issues with the changes in their medication regiment, they should give our office a call to discuss  I also advised the patient not to drive or operate machinery until they see how the changes in the medication regimen affects them  The patient was agreeable and verbalized an understanding  While the patient was in the office today, I did review the patient's report on the [facility] web site and found it to be appropriate for what is being prescribed and I reviewed it for any inconsistencies or evidence of multiple prescribers/drug diversion  A copy of the patient's report can be found in their chart  While the patient was in the office today, an annual review of the opioid contract/agreement was conducted, the patient was agreeable to continuing the contract as previously agreed upon and signed for this calendar year  The patient was given a 2 month supply of prescriptions with a Do Not Fill date(s) of December 27, 2017  The risk of opioid medications, including dependence, addiction and tolerance were explained to the patient  The patient understands and agrees to use these medications only as prescribed  I have fully discussed the potential side effects of the medication with the patient, which include, but are not limited to, constipation, drowsiness, addiction, impaired judgment and risk of fatal overdose as not taken as prescribed  I have warned the patient that sharing medications is a felony  I warned against driving while taking sedating medications  At this point in time, the patient is showing no signs of addiction, abuse, diversion or suicidal ideation  The patient has the current Goals: To hopefully see better overall relief with the changes made to her medication regimen, allowing us to possibly decrease, not titrate her off of her Butrans patch in the future   The patent has the current Barriers: Chronic pain syndrome and opioid dependence  Patient is able to Self-Care  Educational resources provided:   While The patient was in the office today, I explained to them that although I am not prescribing their anti-anxiety/benzodiazapine medications, it has been deemed a black box warning for any patients who are on these types of medications to also be on opioid medications because of the risk for respiratory depression and death  I encouraged the patient to discuss this medication(s) with their prescriber to see if there are any alternative medications  I also explained that if they need to continue with this type of medication, we will have to consider decreasing any opioid medications by at least 25-50% and/or consider titrating off of opioid medications all together as a pre-caution to prevent any harm to the patient or any one else  The patient verbalized an understanding  A signed copy of the patient education sheet reviewing the risks and reasons for prescribing this medication is available in the patient's chart and a copy was also sent home with the patient  Possible side effects of new medications were reviewed with the patient/guardian today  The treatment plan was reviewed with the patient/guardian  The patient/guardian understands and agrees with the treatment plan   The patient was counseled regarding instructions for management, risk factor reductions, prognosis, patient and family education, risks and benefits of treatment options and importance of compliance with treatment  total time of encounter was 25 minutes  Chief Complaint    1  Neck Pain  Right sided greater than left neck and arm pain, stable  History of Present Illness  The patient presents today for a followup office visit  She is currently being treated for her chronic pain symptoms, which the patient reports has remained stable and manageable since her last office visit with her current medication regimen   The patient is currently being managed on the Butrans patch 10 mcg, changing her patch every 7 days and Gabapentin 2700 mg a day  She reports that overall her current medication regimen is providing approximately 60% relief of her pain symptoms, and besides constipation which is managed with MiraLax, she denies any other significant side effects or issues  The patient presents today to discuss her medication regimen treatment plan  Sonia Eubanks presents with complaints of constant episodes of bilateral posterior neck pain, described as sharp, dull, aching and burning, radiating to the bilateral trapezius and right arm  On a scale of 1 to 10, the patient rates the pain as 6  Symptoms are unchanged  Review of Systems    Constitutional: no fever, no recent weight gain and no recent weight loss  Eyes: no double vision and no blurry vision  Cardiovascular: no chest pain, no palpitations and no lower extremity edema  Respiratory: no complaints of shortness of breath and no wheezing  Musculoskeletal: no difficulty walking, no muscle weakness, no joint stiffness, no joint swelling, no limb swelling, no pain in extremity and no decreased range of motion  Neurological: no dizziness, no difficulty swallowing, no memory loss, no loss of consciousness and no seizures  Gastrointestinal: no nausea, no vomiting, no constipation and no diarrhea  Genitourinary: no difficulty initiating urine stream, no genital pain and no frequent urination  Integumentary: no complaints of skin rash  Psychiatric: no depression  Endocrine: no excessive thirst, no adrenal disease, no hypothyroidism and no hyperthyroidism  Hematologic/Lymphatic: no tendency for easy bruising and no tendency for easy bleeding  Active Problems    1  Acid reflux disease (530 81) (K21 9)   2  Aftercare following surgery of the musculoskeletal system (V58 78) (Z47 89)   3  Allergic rhinitis (477 9) (J30 9)   4  Analgesic use (V58 69) (Z79 899)   5   Avulsion fracture of middle phalanx of finger, closed, initial encounter (816 01)   (S62 629A)   6  Bilateral hand pain (729 5) (M79 641,M79 642)   7  Cervical radiculopathy (723 4) (M54 12)   8  Chronic cervical pain (723 1,338 29) (M54 2,G89 29)   9  Chronic myofascial pain (729 1,338 29) (M79 1,G89 29)   10  Closed displaced fracture of proximal phalanx of left ring finger, initial encounter (816 01)    (S62 615A)   11  Contracture of finger joint, 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) (M24 542)   12  Eczema (692 9) (L30 9)   13  Encounter for long-term opiate analgesic use (V58 69) (Z79 891)   14  Esophageal reflux (530 81) (K21 9)   15  Finger fracture, left (816 00) (S62 609A)   16  Finger injury, left, initial encounter (959 5) (S69 92XA)   17  Finger pain (729 5) (M79 646)   18  Gastroenteritis (558 9) (K52 9)   19  Generalized anxiety disorder (300 02) (F41 1)   20  Hearing loss (389 9) (H91 90)   21  History of removal of retained hardware (V45 89) (Z98 890)   22  Impacted cerumen of right ear (380 4) (H61 21)   23  Irritable bowel syndrome (564 1) (K58 9)   24  Non-toxic uninodular goiter (241 0) (E04 1)   25  Opioid dependence (304 00) (F11 20)   26  Pain in right shoulder (719 41) (M25 511)   27  Pain of left middle finger (729 5) (M79 645)   28  Pain syndrome, chronic (338 4) (G89 4)   29  Painful orthopaedic hardware (996 78) (T84 84XA)   30  Paresthesias/numbness (782 0) (R20 9)   31  Stiffness of finger joint of left hand (719 54) (M25 642)   32  Suprascapular neuropathy (354 8) (G56 80)   33  Tendonitis Of The Left Shoulder (726 10)   34  Thyroid nodule (241 0) (E04 1)   35  Visit for screening mammogram (V76 12) (Z12 31)   36  Xerostomia due to mouth breathing (527 7,784 99) (R68 2,R06 5)    Past Medical History    1  History of Abdominal pain, LLQ (left lower quadrant) (789 04) (R10 32)   2  History of Anxiety (300 00) (F41 9)   3  History of acute sinusitis (V12 69) (Z87 09)   4  History of arthritis (V13 4) (Z87 39)   5   History of depression (V11 8) (Z86 59)   6  History of diarrhea (V12 79) (Z87 898)   7  History of dysmenorrhea (V13 29) (Z87 42)   8  History of screening mammography (V15 89) (Z92 89)    The active problems and past medical history were reviewed and updated today  Surgical History    1  History of Appendectomy   2  History of Shoulder Surgery    The surgical history was reviewed and updated today  Family History  Mother    1  Family history of Esophageal Reflux   2  Family history of cerebrovascular accident (V17 1) (Z82 3)   3  Family history of Hyperlipidemia   4  Family history of Hypertension (V17 49)  Father    5  Family history of cerebrovascular accident (V17 1) (Z82 3)  Unknown    6  Family history of cerebrovascular accident (V17 1) (Z82 3)   7  Family history of colon cancer (V16 0) (Z80 0)   8  Family history of diabetes mellitus (V18 0) (Z83 3)   9  Family history of malignant neoplasm of breast (V16 3) (Z80 3)   10  Family history of skin cancer (V16 8) (Z80 8)    The family history was reviewed and updated today  Social History    · Former smoker (W38 18) (K38 595)   · No alcohol use  The social history was reviewed and updated today  The social history was reviewed and is unchanged  Current Meds   1  Ambien 10 MG Oral Tablet; TAKE 1 TABLET AT  BEDTIME AS NEEDED FOR INSOMNIA; Therapy: (Recorded:08Oct2015) to Recorded   2  Baclofen 10 MG Oral Tablet; take 1 tablet 3 times daily as needed for muscle spasm; Therapy: 52HAU0734 to (72 240 26 09)  Requested for: 20DOQ0359; Last   Rx:10Oct2017 Ordered   3  Butrans 10 MCG/HR Transdermal Patch Weekly; use 1 patch weekly; Therapy: 98Iwh5166 to (Evaluate:63Naz6467); Last Rx:10Oct2017 Ordered   4  Chlorhexidine Gluconate 4 % External Liquid; USE AS DIRECTED; Therapy: 03OWW2756 to (Last Rx:06Jun2016)  Requested for: 06Jun2016 Ordered   5  ClonazePAM 1 MG Oral Tablet; 1 tab up to 4 times daily  as needed;    Therapy: (Recorded:32Ifc9735) to Recorded   6  Effexor XR 75 MG Oral Capsule Extended Release 24 Hour; take 1 capsule daily; Therapy: (Recorded:08Mar2016) to Recorded   7  Esomeprazole Magnesium 40 MG Oral Capsule Delayed Release; TAKE ONE CAPSULE   BY MOUTH EVERY DAY; Therapy: 59WMP6822 to (Evaluate:05Ygl3320)  Requested for: 95Nxs9682; Last   Rx:01Mar2017; Status: ACTIVE - Renewal Denied Ordered   8  Fluticasone Propionate 50 MCG/ACT Nasal Suspension; instill 2 sprays into each nostril   once daily; Therapy: 71WIV8859 to (05 12 73 93 30)  Requested for: 21PYN7016; Last   VF:20PYX0907 Ordered   9  Gabapentin 100 MG Oral Capsule; TAKE 1 CAPSULE 3 TIMES DAILY **IN ADDITION TO   800 MG DOSE; Therapy: 57TKD7746 to ([phone])  Requested for: 80BOP3976; Last   Rx:10Oct2017 Ordered   10  Gabapentin 400 MG Oral Capsule; take 2 capsules 3 times daily; Therapy: 10GFF7503 to (Evaluate:08Jan2018)  Requested for: 08ASR3086; Last    Rx:10Oct2017 Ordered   11  Polyethylene Glycol 3350 Oral Powder; MIX 17 GM IN 8 OUNCES OF LIQUID AND DRINK    1 TO 2 TIMES DAILY FOR CONSTIPATION; Therapy: 58OIE0299 to (Allen Charles)  Requested for: 56BTA8594; Last    Rx:10Oct2017 Ordered   12  Tegaderm Miscellaneous; USE AS DIRECTED; Therapy: 58MDD3405 to (Evaluate:78Rvo5144)  Requested for: 08QOB9003; Last    Rx:17Oct2016 Ordered   13  Triamcinolone Acetonide 0 5 % External Cream; APPLY 2-3 TIMES DAILY TO AFFECTED    AREA(S); Therapy: 55FBZ5249 to (Evaluate:96Vpz2626)  Requested for: 01DQE2623; Last    Rx:06Jun2017 Ordered    The medication list was reviewed and updated today  Allergies    1  Naprosyn TABS   2   Vancomycin    Vitals  Vital Signs    Recorded: 02FCA6647 08:23AM   Temperature 98 2 F   Heart Rate 60   Respiration 12   Systolic 84   Diastolic 60   Height 5 ft 3 in   Weight 107 lb    BMI Calculated 18 95   BSA Calculated 1 48     Physical Exam    Constitutional   General appearance: Abnormal   chronically ill, uncomfortable, appears tired and appears older than stated age  Eyes   Sclera: anicteric   HEENT   Hearing grossly intact  Neck   Neck: Abnormal   The neck was normal and showed no buffalo hump  The neck was tender, but was not swollen, was not warm and had no masses  the trachea was midline  Pulmonary   Respiratory effort: Even and unlabored  Cardiovascular   Examination of extremities: No edema or pitting edema present  Abdomen   Abdomen: Soft, non-tender, non-distended  Skin   Skin and subcutaneous tissue: Normal without rashes or lesions, well hydrated  Psychiatric   Mood and affect: Mood and affect appropriate  Neurologic   Cranial nerves: Cranial nerves II-XII grossly intact  the muscle tone was normal   Musculoskeletal Tandem Gait: Intact      Results/Data  Procedure Flowsheet [id number] 08:19AM      Test Name Result Flag Reference   Opioid Contract Renewed [id number]         Future Appointments    Date/Time Provider Specialty Site   01/24/2018 08:30 AM CHRIS Grimm Pain Management [facility]   12/13/2017 08:15 AM THADDEUS Gonsales   Orthopedic Surgery [address]     Signatures   Electronically signed by : [facility] Hanna [facility]; Nov 30 2017  7:18AM EST                       (Author)

## 2017-12-06 ENCOUNTER — APPOINTMENT (OUTPATIENT)
Dept: OCCUPATIONAL THERAPY | Age: 42
End: 2017-12-06
Attending: ORTHOPAEDIC SURGERY
Payer: MEDICARE

## 2017-12-06 PROCEDURE — 97010 HOT OR COLD PACKS THERAPY: CPT

## 2017-12-06 PROCEDURE — 97140 MANUAL THERAPY 1/> REGIONS: CPT

## 2017-12-12 ENCOUNTER — APPOINTMENT (OUTPATIENT)
Dept: OCCUPATIONAL THERAPY | Age: 42
End: 2017-12-12
Attending: ORTHOPAEDIC SURGERY
Payer: MEDICARE

## 2017-12-12 PROCEDURE — G8990 OTHER PT/OT CURRENT STATUS: HCPCS

## 2017-12-12 PROCEDURE — G8991 OTHER PT/OT GOAL STATUS: HCPCS

## 2017-12-12 PROCEDURE — 97110 THERAPEUTIC EXERCISES: CPT

## 2017-12-12 PROCEDURE — 97018 PARAFFIN BATH THERAPY: CPT

## 2017-12-12 PROCEDURE — 97140 MANUAL THERAPY 1/> REGIONS: CPT

## 2017-12-13 ENCOUNTER — ALLSCRIPTS OFFICE VISIT (OUTPATIENT)
Dept: OTHER | Facility: OTHER | Age: 42
End: 2017-12-13

## 2017-12-13 ENCOUNTER — HOSPITAL ENCOUNTER (OUTPATIENT)
Dept: RADIOLOGY | Facility: HOSPITAL | Age: 42
Discharge: HOME | End: 2017-12-13
Attending: ORTHOPAEDIC SURGERY
Payer: MEDICARE

## 2017-12-13 DIAGNOSIS — Z47.89 ENCOUNTER FOR OTHER ORTHOPEDIC AFTERCARE (CODE): ICD-10-CM

## 2017-12-13 PROCEDURE — 73140 X-RAY EXAM OF FINGER(S): CPT

## 2017-12-14 ENCOUNTER — APPOINTMENT (OUTPATIENT)
Dept: OCCUPATIONAL THERAPY | Age: 42
End: 2017-12-14
Attending: ORTHOPAEDIC SURGERY
Payer: MEDICARE

## 2017-12-15 NOTE — PROGRESS NOTES
Assessment  1  Aftercare following surgery of the musculoskeletal system (V58 78) (Z56 09)    Plan   · * XR FINGER LEFT FOURTH DIGIT-RING; Status:Active - Retrospective By ProtocolAuthorization; Requested for:55Cvh0669;    · *1 - [facility]  *  Status: Active  Requested for:39Ekj5964  Care Summary provided  : Yes    Discussion/Summary    Patient is doing very wellX-rays reviewed with patientAt this time we will have her continue occupational therapy as directed she may also continue to wear her dynamic extension splint daily  Weightbearing and activities can be as tolerated without formal restrictionCall if condition worsensFollow-up in 2-3 months for reevaluationScribe attestation:I, Dennie Sero am acting as a scribe while in the presence of the attending physician  Physician Attestation:IJoshua MD, personally performed the services described in this documentation as scribed in my presence  Chief Complaint  1  Finger Problem  Patient presents for follow-up status post left ring finger removal of hardware times approximately 7 weeks ago      Post-Op  HPI: Patient is a 58-year-old, right-hand-dominant female who presents for follow-up status post left ring finger removal of hardware times approximately 7 weeks ago  Overall patient is doing well  She continues to go to occupational therapy 2-3 times a week as directed  She notes increased range of motion with her ring finger  She she does report a mild decrease in sensation to the distal tip of said finger  However, she denies any significant pain, fever, chills or other related symptoms  The past medical, surgical, family, and social history as well as medications and allergies were reviewed  Updates as needed were performed  Review of systems was recorded on a separate intake sheet, this was reviewed as well  Review of Systems    ROS reviewed  Active Problems  1  Acid reflux disease (812 81) (K21 9)   2  Aftercare following surgery of the musculoskeletal system (V58 78) (Z47 89)   3  Allergic rhinitis (477 9) (J30 9)   4  Analgesic use (V58 69) (Z79 899)   5  Avulsion fracture of middle phalanx of finger, closed, initial encounter (816 01) (S62 629A)   6  Bilateral hand pain (729 5) (M79 641,M79 642)   7  Cervical radiculopathy (723 4) (M54 12)   8  Chronic cervical pain (723 1,338 29) (M54 2,G89 29)   9  Chronic myofascial pain (729 1,338 29) (M79 1,G89 29)   10  Closed displaced fracture of proximal phalanx of left ring finger, initial encounter (816 01)  (S62 615A)   11  Contracture of finger joint, 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) (M24 542)   12  Eczema (692 9) (L30 9)   13  Encounter for long-term opiate analgesic use (V58 69) (Z79 891)   14  Esophageal reflux (530 81) (K21 9)   15  Finger fracture, left (816 00) (S62 609A)   16  Finger injury, left, initial encounter (959 5) (S69 92XA)   17  Finger pain (729 5) (M79 646)   18  Gastroenteritis (558 9) (K52 9)   19  Generalized anxiety disorder (300 02) (F41 1)   20  Hearing loss (389 9) (H91 90)   21  History of removal of retained hardware (V45 89) (Z98 890)   22  Impacted cerumen of right ear (380 4) (H61 21)   23  Irritable bowel syndrome (564 1) (K58 9)   24  Non-toxic uninodular goiter (241 0) (E04 1)   25  Opioid dependence (304 00) (F11 20)   26  Pain in right shoulder (719 41) (M25 511)   27  Pain of left middle finger (729 5) (M79 645)   28  Pain syndrome, chronic (338 4) (G89 4)   29  Painful orthopaedic hardware (996 78) (T84 84XA)   30  Paresthesias/numbness (782 0) (R20 9)   31  Stiffness of finger joint of left hand (719 54) (M25 642)   32  Suprascapular neuropathy (354 8) (G56 80)   33  Tendonitis Of The Left Shoulder (726 10)   34  Thyroid nodule (241 0) (E04 1)   35  Visit for screening mammogram (V76 12) (Z12 31)   36  Xerostomia due to mouth breathing (527 7,784 99) (R68 2,R06 5)    Current Meds   1   Ambien 10 MG Oral Tablet; TAKE 1 TABLET AT  BEDTIME AS NEEDED FOR INSOMNIA; Therapy: (Recorded:08Oct2015) to Recorded   2  Baclofen 10 MG Oral Tablet; take 1 tablet 3 times daily as needed for muscle spasm; Therapy: 52Mug9018 to ()  Requested for: 77CHK2342; Last Rx:29Nov2017 Ordered   3  Butrans 10 MCG/HR Transdermal Patch Weekly; use 1 patch weekly; Therapy: 29Apr2016 to (Evaluate:14Wsx9239); Last Rx:29Nov2017 Ordered   4  ClonazePAM 1 MG Oral Tablet; 1 tab up to 4 times daily  as needed; Therapy: (Recorded:32Gxv3142) to Recorded   5  Effexor XR 75 MG Oral Capsule Extended Release 24 Hour; take 1 capsule daily; Therapy: (Recorded:08Mar2016) to Recorded   6  Esomeprazole Magnesium 40 MG Oral Capsule Delayed Release; TAKE ONE CAPSULE BY MOUTH EVERY DAY; Therapy: 13MQZ4815 to (Evaluate:82Axj9913)  Requested for: 03Yen8985; Last Rx:01Mar2017; Status: ACTIVE - Renewal Denied Ordered   7  Fluticasone Propionate 50 MCG/ACT Nasal Suspension; instill 2 sprays into each nostril once daily; Therapy: 84RYU6852 to (Evaluate:14Eis3599)  Requested for: 60Xmq7552; Last Rx:12Dec2017 Ordered   8  Lyrica 75 MG Oral Capsule; Take 1 pill daily x 7 days, then take 1 pill 2 x daily x 7 days, then 1 pill 3 x daily x 7 days, then 1 pill 4 x daily; Therapy: 67DPF0528 to (Evaluate:28Jan2018); Last Rx:29Nov2017 Ordered   9  Polyethylene Glycol 3350 Oral Powder; MIX 17 GM IN 8 OUNCES OF LIQUID AND DRINK 1 TO 2 TIMES DAILY FOR CONSTIPATION; Therapy: 88DEM8320 to ([place])  Requested for: 21NAZ5723; Last Rx:10Oct2017 Ordered   10  Tegaderm Miscellaneous; USE AS DIRECTED; Therapy: 97SBS9378 to (Evaluate:08Qce8006)  Requested for: 76COY7896; Last  Rx:17Oct2016 Ordered   11  Triamcinolone Acetonide 0 5 % External Cream; APPLY 2-3 TIMES DAILY TO  AFFECTED AREA(S); Therapy: 76AYN4969 to (Evaluate:59Ibk1968)  Requested for: 21BKP8673; Last  Rx:06Jun2017 Ordered    Allergies  1  Naprosyn TABS   2   Vancomycin    Vitals  Signs   Heart Rate: 53  Systolic: 697  Diastolic: 67  Height: 5 ft 3 in  Weight: 107 lb 2 oz  BMI Calculated: 18 98    Physical Exam     General: No acute distress, age-appropriate  HEENT: Normocephalic atraumatic, mucous membranes are moist, sclera are nonicteric  Cardiovascular: No discernable arrhythmia  Respiratory: Breathing is even and unlabored, no stridor or audible wheezing  Left hand: There is a flexion contracture noted at the ring finger PIP joint  MP joint with full range of motion and approximate 45Â° of hyperextension  PIP joint 30-60Â° range of motion on flexion and extension , No other gross deformity, erythema edema or ecchymosis  Incision site remains well-healed  Neurovascularly is grossly intact  Results/Data   X-ray Review X-rays of left hand/ring finger demonstrate evidence of bone healing around the screw holes  There is no other osseous abnormality  Future Appointments    Date/Time Provider Specialty Site   01/24/2018 08:30 AM CHRIS [facility]   03/14/2018 08:30 AM THADDEUS Elise   Orthopedic Surgery [address]     Signatures   Electronically signed by : JAKE Felipe; Dec 13 2017  9:05AM EST                       (Author)    Electronically signed by : THADDEUS Munoz ; Dec 13 2017  2:28PM EST                       (Author)

## 2017-12-19 ENCOUNTER — APPOINTMENT (OUTPATIENT)
Dept: OCCUPATIONAL THERAPY | Age: 42
End: 2017-12-19
Attending: ORTHOPAEDIC SURGERY
Payer: MEDICARE

## 2018-01-02 ENCOUNTER — APPOINTMENT (OUTPATIENT)
Dept: OCCUPATIONAL THERAPY | Age: 43
End: 2018-01-02
Attending: ORTHOPAEDIC SURGERY
Payer: MEDICARE

## 2018-01-02 PROCEDURE — 97168 OT RE-EVAL EST PLAN CARE: CPT

## 2018-01-02 PROCEDURE — G8990 OTHER PT/OT CURRENT STATUS: HCPCS

## 2018-01-02 PROCEDURE — 97018 PARAFFIN BATH THERAPY: CPT

## 2018-01-02 PROCEDURE — G8991 OTHER PT/OT GOAL STATUS: HCPCS

## 2018-01-02 PROCEDURE — 97140 MANUAL THERAPY 1/> REGIONS: CPT

## 2018-01-04 ENCOUNTER — APPOINTMENT (OUTPATIENT)
Dept: OCCUPATIONAL THERAPY | Age: 43
End: 2018-01-04
Attending: ORTHOPAEDIC SURGERY
Payer: MEDICARE

## 2018-01-09 ENCOUNTER — APPOINTMENT (OUTPATIENT)
Dept: OCCUPATIONAL THERAPY | Age: 43
End: 2018-01-09
Attending: ORTHOPAEDIC SURGERY
Payer: MEDICARE

## 2018-01-10 NOTE — RESULT NOTES
Message   Recorded as Task   Date: 03/21/2017 09:42 AM, Created By: Anderson Shaw   Task Name: Miscellaneous   Assigned To: [facility]   Regarding Patient: Monster Mulligan, Status: In Progress   Comment:    SevenElla - 21 Mar 2017 9:42 AM     TASK CREATED  Pt called to r/s her apt from 4/25 to 5/9 b/c of a family emergency  Pt stated she will need a script for Butrans patch before her appt  Pls call pt at [phone] to discuss when she can pick it up since she will be going away  [facility] - 21 Mar 2017 9:57 AM     TASK REASSIGNED: Previously Assigned To GUERRERO Du - 21 Mar 2017 1:13 PM     TASK EDITED  PT called again and stated she needs her script by 5/2/17  Call back number: [phone]  PT's mother's phone: [phone]   [facility] - 21 Mar 2017 1:26 PM     TASK EDITED  looks like she a script of DNFB  3/26/17, 5/9/17 is next appt  AS please advise  Mara Hall - 21 Mar 2017 1:51 PM     TASK REPLIED TO: Previously Assigned To Mara Hall  Have patient call back a week before she runs out of medications and I will refill her until her follow up appointment  If she has a DNFB 3/26/17, she should be good until 4/25/17  What are the dates of her travel? Keturah Santos - 21 Mar 2017 2:02 PM     TASK EDITED  LMOM on home/cell # for pt to C/B, c/B # provided  Alecia Sy - 22 Mar 2017 10:03 AM     TASK REPLIED TO: Previously Assigned To [facility]      Patient came in today and told her to call a week before running out of medications  She is fine with that! She came in bc she said you guys were playing phone tag  No need to call her back she's aware          Signatures   Electronically signed by : Taylor Chirinos, ; Mar 22 2017  2:04PM EST                       (Author)

## 2018-01-11 ENCOUNTER — APPOINTMENT (OUTPATIENT)
Dept: OCCUPATIONAL THERAPY | Age: 43
End: 2018-01-11
Attending: ORTHOPAEDIC SURGERY
Payer: MEDICARE

## 2018-01-11 NOTE — RESULT NOTES
Message   Recorded as Task   Date: 04/28/2017 03:25 PM, Created By: Marline Shepherd   Task Name: Miscellaneous   Assigned To: [facility]   Regarding Patient: Angie Montenegro, Status: Active   CommentAura Busing - 28 Apr 2017 3:25 PM     TASK CREATED  Pt called regarding her patches  Please call [phone]  Ileana Thompson - 28 Apr 2017 3:34 PM     TASK EDITED  Addressed inanother task          Signatures   Electronically signed by : Suzanna Beebe, ; Apr 28 2017  3:34PM EST                       (Author)

## 2018-01-11 NOTE — MISCELLANEOUS
Message   Recorded as Task   Date: 02/16/2016 09:29 AM, Created By: Darrick Ackerman   Task Name: Medical Complaint Callback   Assigned To: Darrick Ackerman   Regarding Patient: Serge Cancer, Status: In Progress   Fred Burris - 16 Feb 2016 9:29 AM     TASK CREATED  Caller: Self; Medical Complaint; [phone] (Home); [phone] (Work)  ****FYI****    Received VM from pt  on [facility] triage line from 855 am  Pt  states that she has a patch script for 2/20, but does not have one for March, and does not see Dr Nila Benton until 4/1  Pt  states that she believes there was some confusion because she was originally supposed to switch to the [facility] patch, so patient believes this cause a "mix-up " Pt  requesting c/b at [phone]  Received alternate VM from pt  on [facility] triage line from 908 am  Pt  states to please disregard her last message  Pt  states that she found the rx for March, it was stuck together with the other script  Pt  states "Everything is fine " Pt  apologized, and states she will be at her appt  on 4/1      ****   Katherine Lizarraga - 16 Feb 2016 11:51 AM     TASK REPLIED TO: Previously Assigned To Yolanda Walker  THank you  Kady Mejia - 17 Feb 2016 7:55 AM     TASK IN PROGRESS        Active Problems    1  Abdominal pain, LLQ (left lower quadrant) (789 04) (R10 32)   2  Acute sinusitis (461 9) (J01 90)   3  Analgesic use (V58 69) (Z79 899)   4  Bilateral hand pain (729 5) (M79 641,M79 642)   5  Cervical pain (723 1) (M54 2)   6  Cervical radiculopathy (723 4) (M54 12)   7  Encounter for screening mammogram for malignant neoplasm of breast (V76 12)   (Z12 31)   8  Esophageal reflux (530 81) (K21 9)   9  Generalized anxiety disorder (300 02) (F41 1)   10  Irritable bowel syndrome (564 1) (K58 9)   11  Myofacial muscle pain (729 1) (M79 7)   12  Non-toxic uninodular goiter (241 0) (E04 1)   13  Opioid dependence (304 00) (F11 20)   14   Pain syndrome, chronic (338 4) (G89 4) 15  Paresthesias/numbness (782 0) (R20 9)   16  Shoulder pain, right (719 41) (M25 511)   17  Suprascapular neuropathy (354 8) (G56 80)   18  Tendonitis Of The Left Shoulder (726 10)    Current Meds   1  Ambien 10 MG Oral Tablet (Zolpidem Tartrate); TAKE 1 TABLET AT  BEDTIME AS   NEEDED FOR INSOMNIA; Therapy: (Recorded:08Oct2015) to Recorded   2  Baclofen 10 MG Oral Tablet; take 1 tablet 3 times daily as needed for muscle spasm; Therapy: 88ORS6590 to (Evaluate:15Mar2016)  Requested for: 47CSY0404; Last   Rx:15Jan2016 Ordered   3  Butrans 10 MCG/HR Transdermal Patch Weekly; APPLY 1 PATCH EVERY WEEK; Therapy: 69YHO1886 to (Evaluate:83Gjo2856); Last Rx:15Jan2016 Ordered   4  ClonazePAM 1 MG Oral Tablet; 1 tab up to 4 times daily  as needed; Therapy: (Recorded:12Nvs9295) to Recorded   5  [employer] Melatonin 10 MG Oral Capsule; Therapy: [phone] 5599-2611) to Recorded   6  Cyclobenzaprine HCl - 5 MG Oral Tablet; TAKE 1 TABLET AT BEDTIME AS NEEDED; Therapy: 86DCI5587 to (Evaluate:15Mar2016)  Requested for: 38FWQ3495; Last   Rx:15Jan2016 Ordered   7  Esomeprazole Magnesium 40 MG Oral Capsule Delayed Release; take 1 capsule daily; Therapy: 73WEW8389 to (Last RH:01ZGS5229)  Requested for: 06DFV6820 Ordered   8  FentaNYL 12 MCG/HR Transdermal Patch 72 Hour; 1 patch transdermal q 72 hrs; Therapy: 62QIT5747 to (Evaluate:25Mhy7824); Last Rx:15Jan2016 Ordered   9  Gabapentin 100 MG Oral Capsule; TAKE 1 CAPSULE 3 TIMES DAILY **IN ADDITION    MG DOSE; Therapy: 88AEH7327 to (Evaluate:15Mar2016)  Requested for: 18PFS5018; Last   Rx:15Jan2016 Ordered   10  Gabapentin 400 MG Oral Capsule; take 2 capsules 3 times daily; Therapy: 89NZE3634 to (Evaluate:15Mar2016)  Requested for: 56BKJ3225; Last    Rx:15Jan2016 Ordered   11  Hydrocodone-Acetaminophen 5-325 MG Oral Tablet; TAKE 1 TABLET Every 8 hours; Therapy: 60ITY8891 to (Evaluate:19Yim9397); Last Rx:15Jan2016 Ordered   12   Polyethylene Glycol 3350 Oral Powder; MIX 17 GM IN 8 OUNCES OF LIQUID AND    DRINK 1 TO 2 TIMES DAILY FOR CONSTIPATION; Therapy: 55ESD0372 to (Evaluate:15Mar2016)  Requested for: 15FGP5121; Last    Rx:15Jan2016 Ordered   13  Restasis 0 05 % Ophthalmic Emulsion; INSTILL 1 DROP IN [facility] EYE TWICE DAILY; Therapy: 39PSE9270 to (Evaluate:23Jan2015) Recorded    Allergies    1   Naprosyn TABS    Signatures   Electronically signed by : Marquis Jose Carlos RN; Feb 17 2016  7:55AM EST                       (Author)

## 2018-01-12 VITALS
TEMPERATURE: 98.7 F | HEART RATE: 69 BPM | DIASTOLIC BLOOD PRESSURE: 73 MMHG | HEIGHT: 60 IN | SYSTOLIC BLOOD PRESSURE: 113 MMHG | WEIGHT: 109 LBS | BODY MASS INDEX: 21.4 KG/M2

## 2018-01-12 NOTE — MISCELLANEOUS
Message   Recorded as Task   Date: 12/16/2016 04:59 PM, Created By: SHAHRAM ZHAO   Task Name: Care Coordination   Assigned To: [facility]   Regarding Patient: Katherin Saldivar, Status: Active   Comment:    [place] - 16 Dec 2016 4:59 PM     TASK CREATED  Received a notice in the mail from [facility] regarding the concurrent use of an opioid medication with the benzodiazepine  From the patient's chart, it appears that the patient is being prescribed opioid medications with clonazepam     Because of the new FDA black box warning for opioid medications with benzodiazepines, the patient will have to decide whether to continue opioid medication prescribing by myself or continue benzodiazepine therapy with the prescribing physician  If the patient continues opioid medications with myself, the patient will have to be weaned off of benzodiazepines  If the patient needs to be weaned off of benzodiazepines, the patient will need to discuss this with the prescribing physician     [employer]    Also, there is an article from Dr Ciaran Valencia from the FDA urging providers to discontinue either benzodiazepines or opioid medications  [employer] nz   Lelia Gomez - 19 Dec 2016 9:52 AM     TASK EDITED  Lmom for pt to return call  Lelia Gomez - 19 Dec 2016 11:05 AM     TASK EDITED  Spoke to pt, she will continue w/the butrans patch and contact her pcp to wean off klonopin  [place] 17 - 19 Dec 2016 11:33 AM     TASK REPLIED TO: Previously Assigned To [place]  Thanks  Active Problems    1  Abdominal pain, LLQ (left lower quadrant) (789 04) (R10 32)   2  Acute sinusitis (461 9) (J01 90)   3  Allergic rhinitis (477 9) (J30 9)   4  Analgesic use (V58 69) (Z79 899)   5   Avulsion fracture of middle phalanx of finger, closed, initial encounter (816 01) (S62 629A)   6  Bilateral hand pain (729 5) (M79 641,M79 642)   7  Cervical pain (723 1) (M54 2)   8  Cervical radiculopathy (723 4) (M54 12)   9  Closed displaced fracture of proximal phalanx of left ring finger, initial encounter (816 01)   (S62 615A)   10  Diarrhea (787 91) (R19 7)   11  Dysmenorrhea (625 3) (N94 6)   12  Eczema (692 9) (L30 9)   13  Encounter for long-term opiate analgesic use (V58 69) (Z79 891)   14  Encounter for screening mammogram for malignant neoplasm of breast (V76 12)    (Z12 31)   15  Esophageal reflux (530 81) (K21 9)   16  Finger fracture, left (816 00) (S62 609A)   17  Gastroduodenitis (535 50) (K29 90)   18  Generalized anxiety disorder (300 02) (F41 1)   19  Irritable bowel syndrome (564 1) (K58 9)   20  Myofacial muscle pain (729 1) (M79 1)   21  Non-toxic uninodular goiter (241 0) (E04 1)   22  Opioid dependence (304 00) (F11 20)   23  Pain in right shoulder (719 41) (M25 511)   24  Pain syndrome, chronic (338 4) (G89 4)   25  Paresthesias/numbness (782 0) (R20 9)   26  Stiffness of finger joint of left hand (719 54) (M25 642)   27  Suprascapular neuropathy (354 8) (G56 80)   28  Tendonitis Of The Left Shoulder (726 10)    Current Meds   1  Ambien 10 MG Oral Tablet (Zolpidem Tartrate); TAKE 1 TABLET AT  BEDTIME AS   NEEDED FOR INSOMNIA; Therapy: (Recorded:08Oct2015) to Recorded   2  Baclofen 10 MG Oral Tablet; take 1 tablet 3 times daily as needed for muscle spasm; Therapy: 88Tsd7280 to (Evaluate:13Mar2017)  Requested for: 15TEF4878; Last   Rx:17Oct2016 Ordered   3  Butrans 10 MCG/HR Transdermal Patch Weekly; APPLY 1 PATCH EVERY WEEK; Therapy: 29Apr2016 to (Evaluate:12Jan2017); Last Rx:17Oct2016 Ordered   4  Chlorhexidine Gluconate 4 % External Liquid; USE AS DIRECTED; Therapy: 23BCC6364 to (Last Rx:06Jun2016)  Requested for: 06Jun2016 Ordered   5  ClonazePAM 1 MG Oral Tablet; 1 tab up to 4 times daily  as needed; Therapy: (Recorded:65Knh7990) to Recorded   6   [employer]S Melatonin 10 MG Oral Capsule; Therapy: [phone] 0610-2089) to Recorded   7  Cyclobenzaprine HCl - 5 MG Oral Tablet; TAKE 1 TABLET AT BEDTIME AS NEEDED; Therapy: 10GIN6387 to (Evaluate:12Mar2017)  Requested for: 99Aeb4112; Last   Rx:32Qgq2711 Ordered   8  Effexor XR 75 MG Oral Capsule Extended Release 24 Hour (Venlafaxine HCl ER); take 1   capsule daily; Therapy: (Recorded:08Mar2016) to Recorded   9  Esomeprazole Magnesium 40 MG Oral Capsule Delayed Release (NexIUM); TAKE ONE   CAPSULE BY MOUTH EVERY DAY; Therapy: 14TNT3357 to (Evaluate:15Mar2017)  Requested for: 09Ggr0630; Last   Rx:53Vmh9271 Ordered   10  Esomeprazole Magnesium 40 MG Oral Capsule Delayed Release; take 1 capsule daily; Therapy: 21GEB9240 to (Evaluate:82Mqn6385)  Requested for: 69NTW8118; Last    Rx:30Mar2016 Ordered   11  Fluticasone Propionate 50 MCG/ACT Nasal Suspension; instill 2 sprays into each nostril    once daily; Therapy: 22WFB1789 to (Maynor Calhoun)  Requested for: 86GEY1689; Last    Rx:08Mar2016 Ordered   12  Fluticasone Propionate 50 MCG/ACT Nasal Suspension; instill 2 sprays into each nostril    once daily; Therapy: 04IWX0155 to (Evaluate:34Vbo1046)  Requested for: 35Bgs2936; Last    Rx:53Yos8472 Ordered   13  Gabapentin 100 MG Oral Capsule; TAKE 1 CAPSULE 3 TIMES DAILY **IN ADDITION     MG DOSE; Therapy: 13FMS4856 to (Evaluate:15Jan2017)  Requested for: 01DGI1410; Last    Rx:17Oct2016 Ordered   14  Gabapentin 400 MG Oral Capsule; take 2 capsules 3 times daily; Therapy: 30SAW6604 to (Evaluate:15Jan2017)  Requested for: 18SWO4503; Last    Rx:17Oct2016 Ordered   15  Loratadine 10 MG Oral Tablet; TAKE 1 TABLET DAILY; Therapy: 93AMJ2094 to (Evaluate:59Ncq3112)  Requested for: 60AFE2591; Last    Rx:08Mar2016 Ordered   16  Oxycodone-Acetaminophen 5-325 MG Oral Tablet; TAKE 1 TABLET EVERY 8 HOURS    AS NEEDED; Therapy: 29Apr2016 to (Evaluate:09Bmt5864); Last Rx:17Oct2016 Ordered   17   Polyethylene Glycol 3350 Oral Powder; MIX 17 GM IN 8 OUNCES OF LIQUID AND    DRINK 1 TO 2 TIMES DAILY FOR CONSTIPATION; Therapy: 39XPV8052 to (Evaluate:15Jan2017)  Requested for: 42LHP8991; Last    Rx:17Oct2016 Ordered   18  Tegaderm Miscellaneous; USE AS DIRECTED; Therapy: 10YYQ5577 to (Evaluate:70Ttn6615)  Requested for: 66PKV4444; Last    Rx:17Oct2016 Ordered    Allergies    1  Naprosyn TABS   2   Vancomycin    Signatures   Electronically signed by : Demetrius Huynh RN; Dec 19 2016 11:35AM EST                       (Author)

## 2018-01-12 NOTE — RESULT NOTES
Message   Recorded as Task   Date: 12/29/2016 12:55 PM, Created By: Ladonna Rodriguez   Task Name: Med Renewal Request   Assigned To: [facility]   Regarding Patient: Caio Cuello, Status: In Progress   Comment:    JayIleana - 29 Dec 2016 12:55 PM     TASK CREATED  Caller: Self; Renew Medication; [phone] (Home); [phone] (Work)  Attempted to call the pt  to clarify after receiving a vmlom from [address] [place] [address] [phone] requesting a refill on her Butrans patch last prescribed 10/17, pt's next sovs is scheduled for 1/6 c/ AS  LMOM to cb to clarify  Kylie Thompsony - 29 Dec 2016 1:02 PM     TASK EDITED  Looked pt  up on [employer]  net to verify  Looks like script was filled on 12/13/16 and written on 10/17/16 by Kylie Jordany - 29 Dec 2016 2:34 PM     TASK IN PROGRESS   Ileana Thompson - 30 Dec 2016 8:12 AM     TASK EDITED  Attempted to call the pt  and lmom to cb to clarify  Ileana Thompson - 30 Dec 2016 3:29 PM     TASK EDITED  S/w the pt  and scheduled a sovs c/ AO for 1/3/16 at 0900          Signatures   Electronically signed by : Nicolas Armijo, ; Dec 30 2016  3:29PM EST                       (Author)

## 2018-01-13 VITALS
SYSTOLIC BLOOD PRESSURE: 110 MMHG | WEIGHT: 107 LBS | HEIGHT: 63 IN | BODY MASS INDEX: 18.96 KG/M2 | DIASTOLIC BLOOD PRESSURE: 73 MMHG | HEART RATE: 60 BPM | TEMPERATURE: 98.3 F

## 2018-01-13 VITALS
SYSTOLIC BLOOD PRESSURE: 98 MMHG | DIASTOLIC BLOOD PRESSURE: 62 MMHG | TEMPERATURE: 97.6 F | HEART RATE: 60 BPM | RESPIRATION RATE: 20 BRPM | BODY MASS INDEX: 21.97 KG/M2 | WEIGHT: 112.5 LBS

## 2018-01-13 VITALS
TEMPERATURE: 98.2 F | WEIGHT: 107 LBS | DIASTOLIC BLOOD PRESSURE: 60 MMHG | BODY MASS INDEX: 18.96 KG/M2 | SYSTOLIC BLOOD PRESSURE: 84 MMHG | HEART RATE: 60 BPM | HEIGHT: 63 IN | RESPIRATION RATE: 12 BRPM

## 2018-01-13 VITALS
HEART RATE: 50 BPM | HEIGHT: 60 IN | DIASTOLIC BLOOD PRESSURE: 68 MMHG | TEMPERATURE: 97.7 F | SYSTOLIC BLOOD PRESSURE: 103 MMHG | WEIGHT: 112.5 LBS | BODY MASS INDEX: 22.09 KG/M2

## 2018-01-13 VITALS
SYSTOLIC BLOOD PRESSURE: 106 MMHG | DIASTOLIC BLOOD PRESSURE: 67 MMHG | BODY MASS INDEX: 20.81 KG/M2 | HEART RATE: 68 BPM | WEIGHT: 106 LBS | HEIGHT: 60 IN

## 2018-01-13 VITALS
HEIGHT: 63 IN | HEART RATE: 62 BPM | TEMPERATURE: 96.9 F | SYSTOLIC BLOOD PRESSURE: 100 MMHG | RESPIRATION RATE: 16 BRPM | DIASTOLIC BLOOD PRESSURE: 60 MMHG | WEIGHT: 109 LBS | BODY MASS INDEX: 19.31 KG/M2

## 2018-01-13 VITALS
BODY MASS INDEX: 19.38 KG/M2 | HEART RATE: 59 BPM | DIASTOLIC BLOOD PRESSURE: 65 MMHG | TEMPERATURE: 99.3 F | WEIGHT: 109.38 LBS | HEIGHT: 63 IN | SYSTOLIC BLOOD PRESSURE: 103 MMHG

## 2018-01-13 NOTE — RESULT NOTES
Message   Recorded as Task   Date: 02/02/2017 10:34 AM, Created By: Gayla Conner   Task Name: Med Renewal Request   Assigned To: [facility]   Regarding Patient: Reinier Bal, Status: In Progress   Comment:    Ileana Thompson - 02 Feb 2017 10:34 AM     TASK CREATED  Caller: Self; Renew Medication; [phone] (Home); [phone] (Work)  Received a TC from the pt  today requesting a refill on the Baclofen 10mg TID, last prescribed by HD in 10/16 c/ 2 refills  Her next sovs is scheduled for 2/28  AS to advise  Thanks   Norwood Moritz - 02 Feb 2017 11:00 AM     TASK REPLIED TO: Previously Assigned To [facility]  According to [employer], she should have enough baclofen until March  Was there a problem in the prescription? Ileana Thompson - 02 Feb 2017 11:11 AM     TASK EDITED  They would not fill because HD ordered it originally  Norwood Moritz - 57 Feb 2017 11:15 AM     TASK REPLIED TO: Previously Assigned To Ileana Brunson - 08 Feb 2017 11:45 AM     TASK EDITED  Attempted to call the pt  and line is with busy signal    Ileana Thompson - 02 Feb 2017 12:43 PM     TASK EDITED  Attempted to call the pt  and left a detailed mom in regards to the previous task     Ileana Thompson - 48 Feb 2017 12:43 PM     TASK IN PROGRESS        Signatures   Electronically signed by : Pranay Huang, ; Feb  3 2017  7:45AM EST                       (Author)

## 2018-01-13 NOTE — MISCELLANEOUS
Message   Recorded as Task   Date: 12/12/2016 08:59 AM, Created By: Susan Myrick   Task Name: Follow Up   Assigned To: [facility]   Regarding Patient: Caio Cuello, Status: In Progress   Comment:    Alida Montalvo - 12 Dec 2016 8:59 AM     TASK CREATED  Caller: Self; General Medical Question; [phone] (Home)  Message left w/ ans sv 12/9/2016 @ 1219  "Pt need a refill to be called in"   Lelia Gomez - 12 Dec 2016 10:14 AM     TASK EDITED  Spoke to pt, she is requesting a refill on the cyclobenzaprine 5mg she takes one tab at [facility] [place]  Pt has an sovs w/Dr Gomez on 1/6/17  [employer] 17 - 12 Dec 2016 10:55 AM     TASK REPLIED TO: Previously Assigned To [employer]   She should have enough cyclobenzaprine until 1/15/17  Lelia Gomez - 12 Dec 2016 11:10 AM     TASK EDITED  Spoke to pharmacist @ [facility] and he states they never received the renewal from 10/17/16  [place] - 12 Dec 2016 12:54 PM     TASK REPLIED TO: Previously Assigned To Chucky Ly                      Refill sent to [facility]   Lelia Gomez - 12 Dec 2016 1:50 PM     TASK EDITED  Lmom for pt to return call  Lelia Gomez - 12 Dec 2016 3:20 PM     TASK EDITED  Pt aware  Active Problems    1  Abdominal pain, LLQ (left lower quadrant) (789 04) (R10 32)   2  Acute sinusitis (461 9) (J01 90)   3  Allergic rhinitis (477 9) (J30 9)   4  Analgesic use (V58 69) (Z79 899)   5  Avulsion fracture of middle phalanx of finger, closed, initial encounter (816 01)   (S62 629A)   6  Bilateral hand pain (729 5) (M79 641,M79 642)   7  Cervical pain (723 1) (M54 2)   8  Cervical radiculopathy (723 4) (M54 12)   9  Closed displaced fracture of proximal phalanx of left ring finger, initial encounter (816 01)   (S62 615A)   10  Diarrhea (787 91) (R19 7)   11  Dysmenorrhea (625 3) (N94 6)   12  Eczema (692 9) (L30 9)   13  Encounter for long-term opiate analgesic use (V58 69) (Z79 891)   14  Encounter for screening mammogram for malignant neoplasm of breast (V76 12)    (Z12 31)   15  Esophageal reflux (530 81) (K21 9)   16  Finger fracture, left (816 00) (S62 609A)   17  Gastroduodenitis (535 50) (K29 90)   18  Generalized anxiety disorder (300 02) (F41 1)   19  Irritable bowel syndrome (564 1) (K58 9)   20  Myofacial muscle pain (729 1) (M79 1)   21  Non-toxic uninodular goiter (241 0) (E04 1)   22  Opioid dependence (304 00) (F11 20)   23  Pain in right shoulder (719 41) (M25 511)   24  Pain syndrome, chronic (338 4) (G89 4)   25  Paresthesias/numbness (782 0) (R20 9)   26  Stiffness of finger joint of left hand (719 54) (M25 642)   27  Suprascapular neuropathy (354 8) (G56 80)   28  Tendonitis Of The Left Shoulder (726 10)    Current Meds   1  Ambien 10 MG Oral Tablet (Zolpidem Tartrate); TAKE 1 TABLET AT  BEDTIME AS   NEEDED FOR INSOMNIA; Therapy: (Recorded:08Oct2015) to Recorded   2  Baclofen 10 MG Oral Tablet; take 1 tablet 3 times daily as needed for muscle spasm; Therapy: 54Ecl8353 to (Evaluate:13Mar2017)  Requested for: 74ALM4928; Last   Rx:17Oct2016 Ordered   3  Butrans 10 MCG/HR Transdermal Patch Weekly; APPLY 1 PATCH EVERY WEEK; Therapy: 29Apr2016 to (Evaluate:12Jan2017); Last Rx:68Kku6729 Ordered   4  Chlorhexidine Gluconate 4 % External Liquid; USE AS DIRECTED; Therapy: 78VYV8571 to (Last Rx:06Jun2016)  Requested for: 06Jun2016 Ordered   5  ClonazePAM 1 MG Oral Tablet; 1 tab up to 4 times daily  as needed; Therapy: (Recorded:14Fvl2736) to Recorded   6  [facility] Melatonin 10 MG Oral Capsule; Therapy: ([phone]) to Recorded   7  Cyclobenzaprine HCl - 5 MG Oral Tablet; TAKE 1 TABLET AT BEDTIME AS NEEDED; Therapy: 26VVO5053 to (Evaluate:12Mar2017)  Requested for: 46Yan8566; Last   Rx:33Suk6484 Ordered   8  Effexor XR 75 MG Oral Capsule Extended Release 24 Hour (Venlafaxine HCl ER); take 1   capsule daily; Therapy: (Recorded:08Mar2016) to Recorded   9   Esomeprazole Magnesium 40 MG Oral Capsule Delayed Release (NexIUM); TAKE ONE   CAPSULE BY MOUTH EVERY DAY; Therapy: 07EYE0990 to (Evaluate:15Mar2017)  Requested for: 90Jno2099; Last   Rx:29Jew0586 Ordered   10  Esomeprazole Magnesium 40 MG Oral Capsule Delayed Release; take 1 capsule daily; Therapy: 62USN6766 to (Evaluate:87Gtb4016)  Requested for: 17LIH4397; Last    Rx:30Mar2016 Ordered   11  Fluticasone Propionate 50 MCG/ACT Nasal Suspension; instill 2 sprays into each nostril    once daily; Therapy: 59LUB0944 to ([facility])  Requested for: 91OOL8626; Last    Rx:08Mar2016 Ordered   12  Fluticasone Propionate 50 MCG/ACT Nasal Suspension; instill 2 sprays into each nostril    once daily; Therapy: 68UDE2731 to (Evaluate:36Mex9075)  Requested for: 74Ama8794; Last    Rx:88Nam2047 Ordered   13  Gabapentin 100 MG Oral Capsule; TAKE 1 CAPSULE 3 TIMES DAILY **IN ADDITION     MG DOSE; Therapy: 89BSM0809 to (Evaluate:15Jan2017)  Requested for: 20WLH1617; Last    Rx:17Oct2016 Ordered   14  Gabapentin 400 MG Oral Capsule; take 2 capsules 3 times daily; Therapy: 04VQF0665 to (Evaluate:15Jan2017)  Requested for: 52YSO1138; Last    Rx:17Oct2016 Ordered   15  Loratadine 10 MG Oral Tablet; TAKE 1 TABLET DAILY; Therapy: 69NQZ4184 to (Evaluate:59Gck3265)  Requested for: 11SXY0637; Last    Rx:08Mar2016 Ordered   16  Oxycodone-Acetaminophen 5-325 MG Oral Tablet; TAKE 1 TABLET EVERY 8 HOURS    AS NEEDED; Therapy: 13Tls2335 to (Evaluate:47Nju7145); Last Rx:17Oct2016 Ordered   17  Polyethylene Glycol 3350 Oral Powder; MIX 17 GM IN 8 OUNCES OF LIQUID AND    DRINK 1 TO 2 TIMES DAILY FOR CONSTIPATION; Therapy: 22PWX9223 to (Evaluate:15Jan2017)  Requested for: 77KQO8309; Last    Rx:17Oct2016 Ordered   18  Tegaderm Miscellaneous; USE AS DIRECTED; Therapy: 39WMS9368 to (Evaluate:58Ltl2578)  Requested for: 04VZN6332; Last    Rx:17Oct2016 Ordered    Allergies    1  Naprosyn TABS   2   Vancomycin    Signatures Electronically signed by : Yamilet Vanegas RN; Dec 12 2016  3:20PM EST                       (Author)

## 2018-01-14 VITALS
BODY MASS INDEX: 19.16 KG/M2 | RESPIRATION RATE: 14 BRPM | SYSTOLIC BLOOD PRESSURE: 106 MMHG | DIASTOLIC BLOOD PRESSURE: 78 MMHG | HEART RATE: 60 BPM | WEIGHT: 108.13 LBS | TEMPERATURE: 98.1 F | HEIGHT: 63 IN

## 2018-01-14 VITALS
HEART RATE: 67 BPM | BODY MASS INDEX: 21.31 KG/M2 | DIASTOLIC BLOOD PRESSURE: 73 MMHG | SYSTOLIC BLOOD PRESSURE: 118 MMHG | WEIGHT: 109.13 LBS

## 2018-01-14 VITALS
DIASTOLIC BLOOD PRESSURE: 67 MMHG | WEIGHT: 106.38 LBS | HEART RATE: 58 BPM | HEIGHT: 63 IN | BODY MASS INDEX: 18.85 KG/M2 | SYSTOLIC BLOOD PRESSURE: 100 MMHG

## 2018-01-14 VITALS
WEIGHT: 113.13 LBS | DIASTOLIC BLOOD PRESSURE: 77 MMHG | BODY MASS INDEX: 22.09 KG/M2 | HEART RATE: 55 BPM | SYSTOLIC BLOOD PRESSURE: 107 MMHG

## 2018-01-14 VITALS
HEIGHT: 60 IN | WEIGHT: 103.5 LBS | BODY MASS INDEX: 20.32 KG/M2 | HEART RATE: 51 BPM | DIASTOLIC BLOOD PRESSURE: 62 MMHG | SYSTOLIC BLOOD PRESSURE: 110 MMHG

## 2018-01-14 VITALS
SYSTOLIC BLOOD PRESSURE: 120 MMHG | DIASTOLIC BLOOD PRESSURE: 79 MMHG | BODY MASS INDEX: 22.01 KG/M2 | HEIGHT: 60 IN | WEIGHT: 112.13 LBS | TEMPERATURE: 97.9 F | HEART RATE: 51 BPM

## 2018-01-14 NOTE — RESULT NOTES
Message   Recorded as Task   Date: 07/05/2016 08:43 AM, Created By: Beht Fajardo   Task Name: Med Renewal Request   Assigned To: [facility]   Regarding Patient: Monster Mulligan, Status: Active   Comment:    Manjit Huang - 05 Jul 2016 8:43 AM     TASK CREATED  Caller: Self; Renew Medication; [phone] (Home); [phone] (Work)  **DR Strong Fails pt***    TC from pt [facility]   Pt has f/u with DR Strong Fails 7-22-16   Mara Sine - 05 Jul 2016 8:48 AM     TASK REPLIED TO: Previously Assigned To [facility]  Sent  Manjit Huang - 05 Jul 2016 9:16 AM     TASK REASSIGNED: Previously Assigned To [facility]  Pt aware  **DR Lizarraga,pt would like me to remind you to send order for tegaderm to place over her butrans patch***   Katherine Monreal - 05 Jul 2016 11:08 AM     TASK REPLIED TO: Previously Assigned To Katherine Monreal  baclofen renewed, tegaderm order placed but it says is otc   Manjit Huang - 05 Jul 2016 12:41 PM     TASK REPLIED TO: Previously Assigned To [facility]  Detailed VM left advising of the same and to cb with any questions  Signatures   Electronically signed by :  Manjit Huang, ; Jul 5 2016 12:42PM EST                       (Author)

## 2018-01-14 NOTE — RESULT NOTES
Message   Recorded as Task   Date: 04/25/2017 03:57 PM, Created By: Niranjan Melgoza   Task Name: Miscellaneous   Assigned To: [facility]   Regarding Patient: Román Medina, Status: Active   CommentPriscila Bauer - 25 Apr 2017 3:57 PM     TASK CREATED  Pt called regarding her medication Polyethylene Glycol  Please call [phone]  Keturah Santos - 25 Apr 2017 4:27 PM     TASK EDITED  LMOM on home/cell # for pt to C/B, C/B # provided and office hours  Keturah Santos - 27 Apr 2017 1:28 PM     TASK EDITED  LMOM on home/cell # for pt to C/B, C/B # provided  Ella Amezcua - 27 Apr 2017 1:54 PM     TASK EDITED  Pt returned call and can be reached at [phone] or her mother's at [phone]  Ileana Thompson - 27 Apr 2017 2:50 PM     TASK EDITED  Attempted to call at both numbers provided and LMOM to cb to clarify  Ileana Thompson - 27 Apr 2017 2:50 PM     TASK IN PROGRESS   Niranjan Melgoza - 27 Apr 2017 3:02 PM     TASK EDITED  Pt returned call and stated she will wait to speak with Maddie Barefoot at her 5/9 appt  regarding this medication          Signatures   Electronically signed by : Clarissa Mcpherson, ; Apr 27 2017  3:30PM EST                       (Author)

## 2018-01-14 NOTE — RESULT NOTES
Message   Recorded as Task   Date: 04/19/2017 09:25 AM, Created By: Topher Pierce   Task Name: Call Back   Assigned To: [facility]   Regarding Patient: Elvia Chambers, Status: Active   Comment:    Kamini Weaver - 19 Apr 2017 9:25 AM     TASK CREATED  [facility] Call [facility]- patient called today requesting a refill of her patches  patient would like to  the script  Stated she needs in by 04/25/17  Please call patient [phone] or [phone] of further info   Mohan Irwin - 19 Apr 2017 9:45 AM     TASK EDITED  S/W pt  Pt stated she had to reschedule her appt  Pt requesting refill for Butrans Patch 10mcg/hr, 1 patch every week  Pt changed the patch today, has 0 left and needs by 4/25/17  Pt stated she spoke to AO already about this and AO told her to call  Please advise  Anay Gonzalez - 19 Apr 2017 10:01 AM     TASK REPLIED TO: Previously Assigned To Sagrario Wills  I will be back in [place] on 4-25-17, she can  a prescription at that time, I will only give her enough to get her to her appt with me on 5-9-17   Mohan Irwin - 19 Apr 2017 10:35 AM     TASK EDITED  S/W pt  Advised pt of the same  Pt verbalized understanding  Keturah Santos - 19 Apr 2017 10:35 AM     TASK COMPLETED   Kamini Weaver - 28 Apr 2017 12:16 PM     TASK REACTIVATED   Kamini Weaver - 28 Apr 2017 12:17 PM     TASK EDITED  Layla Rojas 182 call from [facility] called stating they only have boxes of 4 patches and they don't open the boxes  Would like to know if script of 2 can be changed  Please call [phone]  Keturah Santos - 28 Apr 2017 12:39 PM     TASK REASSIGNED: Previously Assigned To [facility] Ranae Angelucci - 28 Apr 2017 3:25 PM     TASK REPLIED TO: Previously Assigned To Bernabe Villalta  I can re-write for a box of 4 patches  Now it will need to be picked up in the office  Or can it be called in?  Its a schedule 2   Ileana Thompson - 28 Apr 2017 3:34 PM     TASK EDITED  S/w the pharmacist at [facility] provided and she accepted a verbal order as per your instructions, CEDRICK provided  So, no script needed this time  S/w the pt  and she was appreciative  Thanks     Gilles Cherry - 01 Apr 2017 3:58 PM     TASK REPLIED TO: Previously Assigned To Gilles bird md aware        Signatures   Electronically signed by : Cortez Garcia, ; Apr 28 2017  3:59PM EST                       (Author)

## 2018-01-15 ENCOUNTER — APPOINTMENT (OUTPATIENT)
Dept: OCCUPATIONAL THERAPY | Age: 43
End: 2018-01-15
Attending: ORTHOPAEDIC SURGERY
Payer: MEDICARE

## 2018-01-15 NOTE — RESULT NOTES
Message   Recorded as Task   Date: 03/08/2017 10:48 AM, Created By: Keerthi Uriostegui   Task Name: Miscellaneous   Assigned To: [facility]   Regarding Patient: Herrera Rodriguez, Status: In Progress   Comment:    Hodan Noel - 08 Mar 2017 10:48 AM     TASK CREATED  pt called in gives permission for you to speak with her mom Sydney Storey at [phone]  medication question about the patch shes on because of going away  please give the mother a call  thank you   Lelia Gomez - 08 Mar 2017 11:23 AM     TASK EDITED  Cristina Starkey to mother Sydney Storey, she states that the pt just got her butrans patch filled on 3/7/17 and she it will not be due to fill again until 4/4/17, however they will be away on vacation from 3/29 until approx  4/8/17  Mother states that this happened approx  6-8 months ago and they were unable to get it filled because the printed script says void on the bottom  She is inquiring if it could be written on a regular script pad  Advised mother that the script has a DNFB date of 3/26, she will call the pharmacy to see if they can fill it prior to leaving for vacation  *Mother will call back w/update  Lelia Gomez - 08 Mar 2017 11:24 AM     TASK EDITED  *Keep until Suzy(mother) calls backSallie Duque - 08 Mar 2017 12:49 PM     TASK EDITED  If the pharmacy can't fill the medication before 30 days is their anything else that can be done for pt to receive butrans patch while away? Anay Gonzalez - 08 Mar 2017 1:00 PM     TASK REPLIED TO: Previously Assigned To Justa Magaña  the only thing would be for the doctor to write the script with the DNFD and return the one I printed out as my scripts can not be filled out of state  Though her patch stays on for a week, can she apply the new one right before she leaves then she wont need to switch until she gets back?    Lelia Gomez - 08 Mar 2017 1:48 PM     TASK EDITED  *Keep for return call from pt's mother*   Ileana Thompson - 10 Mar 2017 8:06 AM     TASK IN PROGRESS        Signatures   Electronically signed by : Truman Reyes, ; Mar 17 2017 12:33PM EST                       (Author)

## 2018-01-15 NOTE — MISCELLANEOUS
Message   Recorded as Task   Date: 04/19/2017 09:25 AM, Created By: Vicky Olivier   Task Name: Call Back   [facility]   Regarding Patient: Kip Alexander, Status: Active   Comment:    Kamini Weaver - 19 Apr 2017 9:25 AM     TASK CREATED  [facility] Call Center- patient called today requesting a refill of her patches  patient would like to  the script  Stated she needs in by 04/25/17  Please call patient [phone] or [phone] of further info   Inder Lr - 19 Apr 2017 9:45 AM     TASK EDITED  S/W pt  Pt stated she had to reschedule her appt  Pt requesting refill for Butrans Patch 10mcg/hr, 1 patch every week  Pt changed the patch today, has 0 left and needs by 4/25/17  Pt stated she spoke to AO already about this and AO told her to call  Please advise  Anay Gonzalez - 19 Apr 2017 10:01 AM     TASK REPLIED TO: Previously Assigned To Chapo Ames  I will be back in [place] on 4-25-17, she can  a prescription at that time, I will only give her enough to get her to her appt with me on 5-9-17   Inder Lr - 19 Apr 2017 10:35 AM     TASK EDITED  S/W pt  Advised pt of the same  Pt verbalized understanding  Active Problems    1  Abdominal pain, LLQ (left lower quadrant) (789 04) (R10 32)   2  Acute sinusitis (461 9) (J01 90)   3  Allergic rhinitis (477 9) (J30 9)   4  Analgesic use (V58 69) (Z79 899)   5  Avulsion fracture of middle phalanx of finger, closed, initial encounter (816 01)   (S62 629A)   6  Bilateral hand pain (729 5) (M79 641,M79 642)   7  Cervical pain (723 1) (M54 2)   8  Cervical radiculopathy (723 4) (M54 12)   9  Closed displaced fracture of proximal phalanx of left ring finger, initial encounter (816 01)   (S62 615A)   10  Diarrhea (787 91) (R19 7)   11  Dysmenorrhea (625 3) (N94 6)   12  Eczema (692 9) (L30 9)   13  Encounter for long-term opiate analgesic use (V58 69) (Z79 891)   14   Encounter for screening mammogram for malignant neoplasm of breast (V76 12)    (Z12 31)   15  Esophageal reflux (530 81) (K21 9)   16  Finger fracture, left (816 00) (S62 609A)   17  Gastroduodenitis (535 50) (K29 90)   18  Generalized anxiety disorder (300 02) (F41 1)   19  Irritable bowel syndrome (564 1) (K58 9)   20  Myofacial muscle pain (729 1) (M79 1)   21  Non-toxic uninodular goiter (241 0) (E04 1)   22  Opioid dependence (304 00) (F11 20)   23  Pain in right shoulder (719 41) (M25 511)   24  Pain syndrome, chronic (338 4) (G89 4)   25  Paresthesias/numbness (782 0) (R20 9)   26  Stiffness of finger joint of left hand (719 54) (M25 642)   27  Suprascapular neuropathy (354 8) (G56 80)   28  Tendonitis Of The Left Shoulder (726 10)    Current Meds   1  Ambien 10 MG Oral Tablet (Zolpidem Tartrate); TAKE 1 TABLET AT  BEDTIME AS   NEEDED FOR INSOMNIA; Therapy: (Recorded:08Oct2015) to Recorded   2  Baclofen 10 MG Oral Tablet; take 1 tablet 3 times daily as needed for muscle spasm; Therapy: 38DFB2940 to (Evaluate:29Apr2017)  Requested for: 28RTI2464; Last   Rx:34Jbc4068 Ordered   3  Butrans 10 MCG/HR Transdermal Patch Weekly; APPLY 1 PATCH EVERY WEEK; Therapy: 29Apr2016 to (Evaluate:44Sme4907); Last Rx:11Bhy3543 Ordered   4  Chlorhexidine Gluconate 4 % External Liquid; USE AS DIRECTED; Therapy: 31OKI0276 to (Last Rx:06Jun2016)  Requested for: 06Jun2016 Ordered   5  ClonazePAM 1 MG Oral Tablet; 1 tab up to 4 times daily  as needed; Therapy: (Recorded:16Uot5572) to Recorded   6  [facility] Melatonin 10 MG Oral Capsule; Therapy: (782 8442) to Recorded   7  Effexor XR 75 MG Oral Capsule Extended Release 24 Hour (Venlafaxine HCl ER); take 1   capsule daily; Therapy: (Recorded:08Mar2016) to Recorded   8  Esomeprazole Magnesium 40 MG Oral Capsule Delayed Release (NexIUM); TAKE ONE   CAPSULE BY MOUTH EVERY DAY; Therapy: 54YHM2686 to (Evaluate:55Xli5573)  Requested for: 21UQT8762; Last   Rx:01Mar2017 Ordered   9   Esomeprazole Magnesium 40 MG Oral Capsule Delayed Release; take 1 capsule daily; Therapy: 70FXU9794 to (Evaluate:83Rsi6446)  Requested for: 29BOP4848; Last   Rx:30Mar2016 Ordered   10  Fluticasone Propionate 50 MCG/ACT Nasal Suspension; instill 2 sprays into each nostril    once daily; Therapy: 53RBG2824 to (Ronald Domingo)  Requested for: 38AMQ4734; Last    Rx:08Mar2016 Ordered   11  Fluticasone Propionate 50 MCG/ACT Nasal Suspension; instill 2 sprays into each nostril    once daily; Therapy: 65UEZ5076 to (Selina Garcia)  Requested for: 32VWP8562; Last    Rx:05Jan2017 Ordered   12  Gabapentin 100 MG Oral Capsule; TAKE 1 CAPSULE 3 TIMES DAILY **IN ADDITION     MG DOSE; Therapy: 34CDP8346 to (Evaluate:29Apr2017)  Requested for: 70WGC9585; Last    Rx:87Nxz5454 Ordered   13  Gabapentin 400 MG Oral Capsule; take 2 capsules 3 times daily; Therapy: 10KPL7858 to (Evaluate:29Apr2017)  Requested for: 79APX4485; Last    Rx:61Myn4321 Ordered   14  Loratadine 10 MG Oral Tablet; TAKE 1 TABLET DAILY; Therapy: 65KYN2144 to (Evaluate:35Mdh6094)  Requested for: 82UYO6525; Last    Rx:08Mar2016 Ordered   15  Polyethylene Glycol 3350 Oral Powder; MIX 17 GM IN 8 OUNCES OF LIQUID AND    DRINK 1 TO 2 TIMES DAILY FOR CONSTIPATION; Therapy: 89NXP5199 to (Evaluate:62Trd5928)  Requested for: 64LLS3141; Last    Rx:37Qye6903 Ordered   16  Tegaderm Miscellaneous; USE AS DIRECTED; Therapy: 69BWF4322 to (Evaluate:36Mli0601)  Requested for: 72ROX0587; Last    Rx:75Fwx9662 Ordered    Allergies    1  Naprosyn TABS   2   Vancomycin    Signatures   Electronically signed by : Tavo Albert, ; Apr 19 2017 10:35AM EST                       (Author)

## 2018-01-15 NOTE — MISCELLANEOUS
Message   Recorded as Task   Date: 04/28/2017 03:42 PM, Created By: Wes Miranda   Task Name: Follow Up   Assigned To: Silvia Marroquin   Regarding Patient: Taylor Valdez, Status: Active   Comment:    ZakistefanyDestinee key - 28 Apr 2017 3:42 Carles See  Dr Chiquis Andrade printed a rx for Martita Abrams and [facility]   I spoke to the pharmacist who tells me pt came in there this am with a hard copy, but they did not have Butran in stock  She did a search and was able to give the pt  other pharmacies that carried the med  I left a message to pt that if she had any problems to call me  Active Problems    1  Abdominal pain, LLQ (left lower quadrant) (789 04) (R10 32)   2  Acute sinusitis (461 9) (J01 90)   3  Allergic rhinitis (477 9) (J30 9)   4  Analgesic use (V58 69) (Z79 899)   5  Avulsion fracture of middle phalanx of finger, closed, initial encounter (816 01)   (S62 629A)   6  Bilateral hand pain (729 5) (M79 641,M79 642)   7  Cervical pain (723 1) (M54 2)   8  Cervical radiculopathy (723 4) (M54 12)   9  Closed displaced fracture of proximal phalanx of left ring finger, initial encounter (816 01)   (S62 615A)   10  Diarrhea (787 91) (R19 7)   11  Dysmenorrhea (625 3) (N94 6)   12  Eczema (692 9) (L30 9)   13  Encounter for long-term opiate analgesic use (V58 69) (Z79 891)   14  Encounter for screening mammogram for malignant neoplasm of breast (V76 12)    (Z12 31)   15  Esophageal reflux (530 81) (K21 9)   16  Finger fracture, left (816 00) (S62 609A)   17  Gastroduodenitis (535 50) (K29 90)   18  Generalized anxiety disorder (300 02) (F41 1)   19  Irritable bowel syndrome (564 1) (K58 9)   20  Myofacial muscle pain (729 1) (M79 1)   21  Non-toxic uninodular goiter (241 0) (E04 1)   22  Opioid dependence (304 00) (F11 20)   23  Pain in right shoulder (719 41) (M25 511)   24  Pain syndrome, chronic (338 4) (G89 4)   25  Paresthesias/numbness (782 0) (R20 9)   26   Stiffness of finger joint of left hand (719 54) (M22 122)   27  Suprascapular neuropathy (354 8) (G56 80)   28  Tendonitis Of The Left Shoulder (726 10)    Current Meds   1  Ambien 10 MG Oral Tablet (Zolpidem Tartrate); TAKE 1 TABLET AT  BEDTIME AS   NEEDED FOR INSOMNIA; Therapy: (Recorded:08Oct2015) to Recorded   2  Baclofen 10 MG Oral Tablet; take 1 tablet 3 times daily as needed for muscle spasm; Therapy: 11XGI5729 to (Evaluate:24Jun2017)  Requested for: 25Apr2017; Last   Rx:25Apr2017 Ordered   3  Butrans 10 MCG/HR Transdermal Patch Weekly; use 1 patch weekly; Therapy: 66Jse8972 to (Evaluate:98Ioi5753); Last Rx:28Apr2017 Ordered   4  Chlorhexidine Gluconate 4 % External Liquid; USE AS DIRECTED; Therapy: 00QHN2162 to (Last Rx:06Jun2016)  Requested for: 06Jun2016 Ordered   5  ClonazePAM 1 MG Oral Tablet; 1 tab up to 4 times daily  as needed; Therapy: (Recorded:36Iyi7124) to Recorded   6  [facility] Melatonin 10 MG Oral Capsule; Therapy: [phone]) to Recorded   7  Effexor XR 75 MG Oral Capsule Extended Release 24 Hour (Venlafaxine HCl ER); take 1   capsule daily; Therapy: (Recorded:08Mar2016) to Recorded   8  Esomeprazole Magnesium 40 MG Oral Capsule Delayed Release (NexIUM); TAKE ONE   CAPSULE BY MOUTH EVERY DAY; Therapy: 14ETK5203 to (Evaluate:72Jcv4394)  Requested for: 41KST6770; Last   Rx:01Mar2017 Ordered   9  Esomeprazole Magnesium 40 MG Oral Capsule Delayed Release; take 1 capsule daily; Therapy: 59YLQ8105 to (Evaluate:96Fyf9615)  Requested for: 50JFP6698; Last   Rx:30Mar2016 Ordered   10  Fluticasone Propionate 50 MCG/ACT Nasal Suspension; instill 2 sprays into each nostril    once daily; Therapy: 31CVQ0904 to (Tacos Lacy)  Requested for: 99NJC5833; Last    Rx:08Mar2016 Ordered   11  Fluticasone Propionate 50 MCG/ACT Nasal Suspension; instill 2 sprays into each nostril    once daily; Therapy: 80IDW7991 to (Audrey Mis)  Requested for: 16ASX0649; Last    Rx:05Jan2017 Ordered   12  Gabapentin 100 MG Oral Capsule; TAKE 1 CAPSULE 3 TIMES DAILY **IN ADDITION     MG DOSE; Therapy: 50AFT8633 to (Evaluate:24Jun2017)  Requested for: 25Apr2017; Last    Rx:25Apr2017 Ordered   13  Gabapentin 400 MG Oral Capsule; take 2 capsules 3 times daily; Therapy: 27IKA2656 to (Evaluate:24Jun2017)  Requested for: 94Qiu5858; Last    Rx:25Apr2017 Ordered   14  Loratadine 10 MG Oral Tablet; TAKE 1 TABLET DAILY; Therapy: 18QKY3893 to (Evaluate:28Yux7293)  Requested for: 43TVS5228; Last    Rx:08Mar2016 Ordered   15  Polyethylene Glycol 3350 Oral Powder; MIX 17 GM IN 8 OUNCES OF LIQUID AND    DRINK 1 TO 2 TIMES DAILY FOR CONSTIPATION; Therapy: 19UDU5913 to (Evaluate:29Apr2017)  Requested for: 37EHR9311; Last    Rx:02Dxk5203 Ordered   16  Tegaderm Miscellaneous; USE AS DIRECTED; Therapy: 93XBF4682 to (Evaluate:16Pkx5410)  Requested for: 76WFM1070; Last    Rx:90Qzp1261 Ordered    Allergies    1  Naprosyn TABS   2   Vancomycin    Signatures   Electronically signed by : Zach Renee, ; Apr 28 2017  3:42PM EST                       (Author)

## 2018-01-15 NOTE — RESULT NOTES
Message   Recorded as Task   Date: 05/04/2016 10:51 AM, Created By: [facility]   Task Name: Med Renewal Request   Assigned To: [facility]   Regarding Patient: Carroll Benoit, Status: Active   Comment:    Ileana Thompson - 04 May 2016 10:51 AM     TASK CREATED  Caller: Self; Renew Medication; [phone] (Home); [phone] (Work)  Received a TC from the pt  today requesting a refill on the Baclofen and flexeril  She states the [facility] notified that she needs a refill  Looks like in allscripts, it was ordered on 4/29  The pt  states her bottle says 4/2  Her last refill on the flexeril was 4/1  Her next povs is on 5/27  I tried calling [facility] to clarify  No answer X2, was put on hold for 10 mins  HD to advise  Thanks! Ileana Thompson - 04 May 2016 10:56 AM     TASK EDITED  Received another call from the pt  stating to disregard the refill because it is too early? So don't worry about the refill  Evidentally the pt  is confused  Thanks   Katherine Lizarraga - 04 May 2016 12:38 PM     TASK REPLIED TO: Previously Assigned To [facility]  Thank you          Signatures   Electronically signed by : Edyta Bay, ; May  4 2016  1:03PM EST                       (Author)

## 2018-01-16 NOTE — RESULT NOTES
Message   Recorded as Task   Date: 08/01/2016 12:57 PM, Created By: Robert Zhou   Task Name: Med Renewal Request   Assigned To: [facility]   Regarding Patient: Roxane Sosa, Status: Active   CommentBarba Carin - 01 Aug 2016 12:57 PM     TASK CREATED  Caller: Self; Renew Medication; [phone] (Home); [phone] (Work)  Received VM from pt  on [facility] triage line from [phone] am  Pt  states that she needs a refill of her Baclofen and Cyclobenzaprine  Pt  is requesting refills be sent to [facility] pharmacy on file  Pt  requesting c/b at [phone]  Kady Mejia - 01 Aug 2016 1:52 PM     TASK EDITED  Attempted to reach pt  at number provided above  LMOM for pt  to c/b  Kady Mejia - 01 Aug 2016 1:52 PM     TASK IN PROGRESS   Robert Zhou - 01 Aug 2016 3:59 PM     TASK EDITED  Received VM from pt  on [facility] triage line  Pt  states that she is returning our call, was at hand therapy  Pt  states that she needs refills on her Baclofen and Cyclobenzaprine  Per pt  there are no refills on the bottle  Pt  requesting c/b at [phone]  Manjit Huang - Una Aug 2016 10:49 AM     TASK REPLIED TO: Previously Assigned To [facility] [facility]   Spoke with pharmacist at [facility] who state they do have refills available for baclofen and cyclobenzaprine  VM left for pt advising of the same and to cb with any concerns  Signatures   Electronically signed by :  Manjit Huang, ; Aug  2 2016 10:50AM EST                       (Author)

## 2018-01-17 ENCOUNTER — TRANSCRIBE ORDERS (OUTPATIENT)
Dept: ADMINISTRATIVE | Age: 43
End: 2018-01-17

## 2018-01-17 ENCOUNTER — APPOINTMENT (OUTPATIENT)
Dept: RADIOLOGY | Age: 43
End: 2018-01-17
Payer: MEDICARE

## 2018-01-17 ENCOUNTER — APPOINTMENT (OUTPATIENT)
Dept: OCCUPATIONAL THERAPY | Age: 43
End: 2018-01-17
Attending: ORTHOPAEDIC SURGERY
Payer: MEDICARE

## 2018-01-17 ENCOUNTER — APPOINTMENT (OUTPATIENT)
Dept: LAB | Facility: CLINIC | Age: 43
End: 2018-01-17
Payer: MEDICARE

## 2018-01-17 ENCOUNTER — GENERIC CONVERSION - ENCOUNTER (OUTPATIENT)
Dept: OTHER | Facility: OTHER | Age: 43
End: 2018-01-17

## 2018-01-17 DIAGNOSIS — K21.9 GASTRO-ESOPHAGEAL REFLUX DISEASE WITHOUT ESOPHAGITIS: ICD-10-CM

## 2018-01-17 DIAGNOSIS — R63.1 POLYDIPSIA: ICD-10-CM

## 2018-01-17 DIAGNOSIS — R05.9 COUGH: ICD-10-CM

## 2018-01-17 DIAGNOSIS — R53.83 OTHER FATIGUE: ICD-10-CM

## 2018-01-17 DIAGNOSIS — R68.89 OTHER GENERAL SYMPTOMS AND SIGNS: ICD-10-CM

## 2018-01-17 DIAGNOSIS — E86.0 DEHYDRATION: ICD-10-CM

## 2018-01-17 DIAGNOSIS — Z79.891 LONG TERM CURRENT USE OF OPIATE ANALGESIC: ICD-10-CM

## 2018-01-17 DIAGNOSIS — J30.9 ALLERGIC RHINITIS: ICD-10-CM

## 2018-01-17 DIAGNOSIS — M79.10 MYALGIA: ICD-10-CM

## 2018-01-17 LAB
25(OH)D3 SERPL-MCNC: 22.8 NG/ML (ref 30–100)
ALBUMIN SERPL BCP-MCNC: 4 G/DL (ref 3.5–5)
ALP SERPL-CCNC: 50 U/L (ref 46–116)
ALT SERPL W P-5'-P-CCNC: 23 U/L (ref 12–78)
ANION GAP SERPL CALCULATED.3IONS-SCNC: 4 MMOL/L (ref 4–13)
AST SERPL W P-5'-P-CCNC: 13 U/L (ref 5–45)
BASOPHILS # BLD AUTO: 0.01 THOUSANDS/ΜL (ref 0–0.1)
BASOPHILS NFR BLD AUTO: 0 % (ref 0–1)
BILIRUB SERPL-MCNC: 0.41 MG/DL (ref 0.2–1)
BILIRUB UR QL STRIP: NEGATIVE
BUN SERPL-MCNC: 16 MG/DL (ref 5–25)
CALCIUM SERPL-MCNC: 9.3 MG/DL (ref 8.3–10.1)
CHLORIDE SERPL-SCNC: 106 MMOL/L (ref 100–108)
CLARITY UR: CLEAR
CO2 SERPL-SCNC: 31 MMOL/L (ref 21–32)
COLOR UR: YELLOW
CORTIS SERPL-MCNC: 6.6 UG/DL
CREAT SERPL-MCNC: 0.94 MG/DL (ref 0.6–1.3)
CRP SERPL QL: <3 MG/L
EOSINOPHIL # BLD AUTO: 0.17 THOUSAND/ΜL (ref 0–0.61)
EOSINOPHIL NFR BLD AUTO: 2 % (ref 0–6)
ERYTHROCYTE [DISTWIDTH] IN BLOOD BY AUTOMATED COUNT: 12.4 % (ref 11.6–15.1)
ERYTHROCYTE [SEDIMENTATION RATE] IN BLOOD: 2 MM/HOUR (ref 0–20)
EST. AVERAGE GLUCOSE BLD GHB EST-MCNC: 111 MG/DL
FERRITIN SERPL-MCNC: 27 NG/ML (ref 8–388)
GFR SERPL CREATININE-BSD FRML MDRD: 74 ML/MIN/1.73SQ M
GLUCOSE P FAST SERPL-MCNC: 95 MG/DL (ref 65–99)
GLUCOSE UR STRIP-MCNC: NEGATIVE MG/DL
HBA1C MFR BLD: 5.5 % (ref 4.2–6.3)
HCT VFR BLD AUTO: 39.7 % (ref 34.8–46.1)
HGB BLD-MCNC: 13.1 G/DL (ref 11.5–15.4)
HGB UR QL STRIP.AUTO: NEGATIVE
IRON SATN MFR SERPL: 23 %
IRON SERPL-MCNC: 75 UG/DL (ref 50–170)
KETONES UR STRIP-MCNC: NEGATIVE MG/DL
LEUKOCYTE ESTERASE UR QL STRIP: NEGATIVE
LYMPHOCYTES # BLD AUTO: 3.35 THOUSANDS/ΜL (ref 0.6–4.47)
LYMPHOCYTES NFR BLD AUTO: 44 % (ref 14–44)
MCH RBC QN AUTO: 31.5 PG (ref 26.8–34.3)
MCHC RBC AUTO-ENTMCNC: 33 G/DL (ref 31.4–37.4)
MCV RBC AUTO: 95 FL (ref 82–98)
MONOCYTES # BLD AUTO: 0.47 THOUSAND/ΜL (ref 0.17–1.22)
MONOCYTES NFR BLD AUTO: 6 % (ref 4–12)
NEUTROPHILS # BLD AUTO: 3.66 THOUSANDS/ΜL (ref 1.85–7.62)
NEUTS SEG NFR BLD AUTO: 48 % (ref 43–75)
NITRITE UR QL STRIP: NEGATIVE
NRBC BLD AUTO-RTO: 0 /100 WBCS
PH UR STRIP.AUTO: 6 [PH] (ref 4.5–8)
PLATELET # BLD AUTO: 195 THOUSANDS/UL (ref 149–390)
PMV BLD AUTO: 11.8 FL (ref 8.9–12.7)
POTASSIUM SERPL-SCNC: 4 MMOL/L (ref 3.5–5.3)
PROT SERPL-MCNC: 7.5 G/DL (ref 6.4–8.2)
PROT UR STRIP-MCNC: NEGATIVE MG/DL
RBC # BLD AUTO: 4.16 MILLION/UL (ref 3.81–5.12)
SODIUM SERPL-SCNC: 141 MMOL/L (ref 136–145)
SP GR UR STRIP.AUTO: 1.03 (ref 1–1.03)
TIBC SERPL-MCNC: 322 UG/DL (ref 250–450)
TSH SERPL DL<=0.05 MIU/L-ACNC: 1.36 UIU/ML (ref 0.36–3.74)
UROBILINOGEN UR QL STRIP.AUTO: 0.2 E.U./DL
VIT B12 SERPL-MCNC: 465 PG/ML (ref 100–900)
WBC # BLD AUTO: 7.66 THOUSAND/UL (ref 4.31–10.16)

## 2018-01-17 PROCEDURE — 86664 EPSTEIN-BARR NUCLEAR ANTIGEN: CPT

## 2018-01-17 PROCEDURE — 87186 SC STD MICRODIL/AGAR DIL: CPT

## 2018-01-17 PROCEDURE — 83036 HEMOGLOBIN GLYCOSYLATED A1C: CPT

## 2018-01-17 PROCEDURE — 87086 URINE CULTURE/COLONY COUNT: CPT

## 2018-01-17 PROCEDURE — 86140 C-REACTIVE PROTEIN: CPT

## 2018-01-17 PROCEDURE — 86308 HETEROPHILE ANTIBODY SCREEN: CPT

## 2018-01-17 PROCEDURE — 86038 ANTINUCLEAR ANTIBODIES: CPT

## 2018-01-17 PROCEDURE — 85652 RBC SED RATE AUTOMATED: CPT

## 2018-01-17 PROCEDURE — 85025 COMPLETE CBC W/AUTO DIFF WBC: CPT

## 2018-01-17 PROCEDURE — 82728 ASSAY OF FERRITIN: CPT

## 2018-01-17 PROCEDURE — 87147 CULTURE TYPE IMMUNOLOGIC: CPT

## 2018-01-17 PROCEDURE — 86663 EPSTEIN-BARR ANTIBODY: CPT

## 2018-01-17 PROCEDURE — 82533 TOTAL CORTISOL: CPT

## 2018-01-17 PROCEDURE — 86618 LYME DISEASE ANTIBODY: CPT

## 2018-01-17 PROCEDURE — 36415 COLL VENOUS BLD VENIPUNCTURE: CPT

## 2018-01-17 PROCEDURE — 86665 EPSTEIN-BARR CAPSID VCA: CPT

## 2018-01-17 PROCEDURE — 83540 ASSAY OF IRON: CPT

## 2018-01-17 PROCEDURE — 84443 ASSAY THYROID STIM HORMONE: CPT

## 2018-01-17 PROCEDURE — 82607 VITAMIN B-12: CPT

## 2018-01-17 PROCEDURE — 83550 IRON BINDING TEST: CPT

## 2018-01-17 PROCEDURE — 80053 COMPREHEN METABOLIC PANEL: CPT

## 2018-01-17 PROCEDURE — 71046 X-RAY EXAM CHEST 2 VIEWS: CPT

## 2018-01-17 PROCEDURE — 82306 VITAMIN D 25 HYDROXY: CPT

## 2018-01-17 PROCEDURE — 81003 URINALYSIS AUTO W/O SCOPE: CPT

## 2018-01-17 NOTE — MISCELLANEOUS
Message   Recorded as Task   Date: 01/15/2016 10:37 AM, Created By: [facility]   Task Name: Med Renewal Request   Assigned To: [facility] [facility]   Regarding Patient: Adelina Rashid, Status: Active   Comment:    [facility] - 15 [address] 2016 10:37 AM     TASK CREATED  Caller: Self; Renew Medication; [phone] (Home); [phone] (Work)  Pt left vm on the [employer] at [phone] that she was just seen by Dr Lynette Baum and given RX fro Jaylyn patch, took it to [facility] and it's not covered, it would cost $365 00  She said she is on her way back to our office will be there in 5 min with the RX to return it  MayameyaManjit - 15 [address] 2016 10:39 AM     TASK REASSIGNED: Previously Assigned To [facility] [facility]   Katherine Lizarraga - 15 [address] 2016 10:52 AM     TASK REPLIED TO: Previously Assigned To Katherine Lizarraga  She returned the butrans prescription and picked up Fentanyl  This was discussed during ov  Thank you  Active Problems    1  Abdominal pain, LLQ (left lower quadrant) (789 04) (R10 32)   2  Acute sinusitis (461 9) (J01 90)   3  Analgesic use (V58 69) (Z79 899)   4  Bilateral hand pain (729 5) (M79 641,M79 642)   5  Cervical pain (723 1) (M54 2)   6  Cervical radiculopathy (723 4) (M54 12)   7  Encounter for screening mammogram for malignant neoplasm of breast (V76 12)   (Z12 31)   8  Esophageal reflux (530 81) (K21 9)   9  Generalized anxiety disorder (300 02) (F41 1)   10  Irritable bowel syndrome (564 1) (K58 9)   11  Myofacial muscle pain (729 1) (M79 7)   12  Non-toxic uninodular goiter (241 0) (E04 1)   13  Opioid dependence (304 00) (F11 20)   14  Pain syndrome, chronic (338 4) (G89 4)   15  Paresthesias/numbness (782 0) (R20 9)   16  Shoulder pain, right (719 41) (M25 511)   17  Suprascapular neuropathy (354 8) (G56 80)   18  Tendonitis Of The Left Shoulder (726 10)    Current Meds   1   Ambien 10 MG Oral Tablet (Zolpidem Tartrate); TAKE 1 TABLET AT  BEDTIME AS   NEEDED FOR INSOMNIA; Therapy: (Recorded:08Oct2015) to Recorded   2  Baclofen 10 MG Oral Tablet; take 1 tablet 3 times daily as needed for muscle spasm; Therapy: 42JXS7684 to (Evaluate:15Mar2016)  Requested for: 86DDG4224; Last   Rx:15Jan2016 Ordered   3  Butrans 10 MCG/HR Transdermal Patch Weekly; APPLY 1 PATCH EVERY WEEK; Therapy: 04CEW4686 to (Evaluate:83Led2988); Last Rx:15Jan2016 Ordered   4  ClonazePAM 1 MG Oral Tablet; 1 tab up to 4 times daily  as needed; Therapy: (Recorded:80Dqr2834) to Recorded   5  [facility] Melatonin 10 MG Oral Capsule; Therapy: [phone] to Recorded   6  Cyclobenzaprine HCl - 5 MG Oral Tablet; TAKE 1 TABLET AT BEDTIME AS NEEDED; Therapy: 62HLR9156 to (Evaluate:15Mar2016)  Requested for: 42RLA7906; Last   Rx:15Jan2016 Ordered   7  Esomeprazole Magnesium 40 MG Oral Capsule Delayed Release; take 1 capsule daily; Therapy: 40DPA0156 to (Last OM:85OFL6695)  Requested for: 87OGS8292 Ordered   8  FentaNYL 12 MCG/HR Transdermal Patch 72 Hour; 1 patch transdermal q 72 hrs; Therapy: 93IYJ6923 to (Evaluate:28Jpw2212); Last Rx:15Jan2016 Ordered   9  Fluticasone Propionate 50 MCG/ACT Nasal Suspension; instill 2 sprays into each nostril   once daily; Therapy: 53NMJ7395 to (Complete:13Feb2016)  Requested for: 65WZJ6099; Last   Rx:05Nov2015 Ordered   10  Gabapentin 100 MG Oral Capsule; TAKE 1 CAPSULE 3 TIMES DAILY **IN ADDITION     MG DOSE; Therapy: 78QGY9249 to (Evaluate:15Mar2016)  Requested for: 98SAE5886; Last    Rx:15Jan2016 Ordered   11  Gabapentin 400 MG Oral Capsule; take 2 capsules 3 times daily; Therapy: 06LWE4883 to (Evaluate:15Mar2016)  Requested for: 14VYU2088; Last    Rx:15Jan2016 Ordered   12  Hydrocodone-Acetaminophen 5-325 MG Oral Tablet; TAKE 1 TABLET Every 8 hours; Therapy: 09NOG9126 to (Evaluate:96Tvf0645); Last Rx:93Ejb6098 Ordered   13   Polyethylene Glycol 3350 Oral Powder; MIX 17 GM IN 8 OUNCES OF LIQUID AND    DRINK 1 TO 2 TIMES DAILY FOR CONSTIPATION; Therapy: 36UZP2247 to (Evaluate:15Mar2016)  Requested for: 14KBW5415; Last    Rx:15Jan2016 Ordered   14  Restasis 0 05 % Ophthalmic Emulsion; INSTILL 1 DROP IN [facility] EYE TWICE DAILY; Therapy: 71TQY3279 to (Evaluate:23Jan2015) Recorded    Allergies    1  Naprosyn TABS    Signatures   Electronically signed by :  Manjit Huang, ; Mason 15 2016 11:03AM EST                       (Author)

## 2018-01-17 NOTE — MISCELLANEOUS
Message   Recorded as Task   Date: 03/15/2016 01:33 PM, Created By: Perlita Rosas   Task Name: Follow Up   Assigned To: [facility]   Regarding Patient: Taylor Valdez, Status: In Progress   Comment:    Katherine Lizarraga - 15 Mar 2016 1:33 PM     TASK CREATED  Please inform patient of her cervical MRI result:    Mild cervical degenerative disc disease  No disc herniation, canal stenosis or foraminal narrowing  One level also demonstrates improvement  As discussed before our plan is to continue wean and decrease all medication  Wade Hays - 15 Mar 2016 3:40 PM     TASK EDITED  [facility] advising pt of above  ok to leave detailed message per pt release of information  Advised pt to call back with any further question/concerns  Active Problems    1  Abdominal pain, LLQ (left lower quadrant) (789 04) (R10 32)   2  Acute sinusitis (461 9) (J01 90)   3  Allergic rhinitis (477 9) (J30 9)   4  Analgesic use (V58 69) (Z79 899)   5  Bilateral hand pain (729 5) (M79 641,M79 642)   6  Cervical pain (723 1) (M54 2)   7  Cervical radiculopathy (723 4) (M54 12)   8  Diarrhea (787 91) (R19 7)   9  Dysmenorrhea (625 3) (N94 6)   10  Eczema (692 9) (L30 9)   11  Encounter for screening mammogram for malignant neoplasm of breast (V76 12)    (Z12 31)   12  Esophageal reflux (530 81) (K21 9)   13  Gastroduodenitis (535 50) (K29 90)   14  Generalized anxiety disorder (300 02) (F41 1)   15  Irritable bowel syndrome (564 1) (K58 9)   16  Myofacial muscle pain (729 1) (M79 1)   17  Non-toxic uninodular goiter (241 0) (E04 1)   18  Opioid dependence (304 00) (F11 20)   19  Pain syndrome, chronic (338 4) (G89 4)   20  Paresthesias/numbness (782 0) (R20 9)   21  Shoulder pain, right (719 41) (M25 511)   22  Suprascapular neuropathy (354 8) (G56 80)   23  Tendonitis Of The Left Shoulder (726 10)    Current Meds   1   Ambien 10 MG Oral Tablet (Zolpidem Tartrate); TAKE 1 TABLET AT  BEDTIME AS   NEEDED FOR INSOMNIA; Therapy: (Recorded:08Oct2015) to Recorded   2  Baclofen 10 MG Oral Tablet; take 1 tablet 3 times daily as needed for muscle spasm; Therapy: 56ISU2246 to (ViewsIQilla Pier)  Requested for: 63LVO8482; Last   Rx:10Mar2016 Ordered   3  ClonazePAM 1 MG Oral Tablet; 1 tab up to 4 times daily  as needed; Therapy: (Recorded:21Feb2014) to Recorded   4  [facility] Melatonin 10 MG Oral Capsule; Therapy: (Recorded:08Oct2015) to Recorded   5  Cyclobenzaprine HCl - 5 MG Oral Tablet; TAKE 1 TABLET AT BEDTIME AS NEEDED; Therapy: 64OLX1271 to (Evaluate:15Mar2016)  Requested for: 68GKD9884; Last   Rx:15Jan2016 Ordered   6  Effexor XR 75 MG Oral Capsule Extended Release 24 Hour (Venlafaxine HCl ER); take 1   capsule daily; Therapy: (Recorded:08Mar2016) to Recorded   7  Esomeprazole Magnesium 40 MG Oral Capsule Delayed Release (NexIUM); TAKE ONE   CAPSULE BY MOUTH EVERY DAY; Therapy: 23ZJG9177 to (Evaluate:45Juj7554)  Requested for: 15GQK8302; Last   Rx:08Mar2016 Ordered   8  FentaNYL 12 MCG/HR Transdermal Patch 72 Hour; 1 patch transdermal q 72 hrs; Therapy: 07XCY8233 to (Evaluate:13Pde9354); Last Rx:15Jan2016 Ordered   9  Fluticasone Propionate 50 MCG/ACT Nasal Suspension; instill 2 sprays into each nostril   once daily; Therapy: 18GQL8675 to ([phone])  Requested for: 46JND2927; Last   Rx:08Mar2016 Ordered   10  Gabapentin 100 MG Oral Capsule; TAKE 1 CAPSULE 3 TIMES DAILY **IN ADDITION     MG DOSE; Therapy: 73ZRC1668 to (ViewsIQilla Pier)  Requested for: 28OUU3714; Last    Rx:10Mar2016 Ordered   11  Gabapentin 400 MG Oral Capsule; take 2 capsules 3 times daily; Therapy: 36IAW6642 to (ViewsIQilla Pier)  Requested for: 80IYP4600; Last    Rx:10Mar2016 Ordered   12  Hydrocodone-Acetaminophen 5-325 MG Oral Tablet; TAKE 1 TABLET Every 8 hours; Therapy: 44YKO2873 to (Evaluate:91Vjp4098); Last Rx:15Jan2016 Ordered   13  Loratadine 10 MG Oral Tablet; TAKE 1 TABLET DAILY;     Therapy: 52DGD4555 to (Evaluate:69Uim5769)  Requested for: 19QKU0826; Last    Rx:08Mar2016 Ordered   14  Restasis 0 05 % Ophthalmic Emulsion; INSTILL 1 DROP IN [facility] EYE TWICE DAILY; Therapy: 19HSX7191 to (WAUQWKVL:87QRA3444) Recorded   15  Triamcinolone Acetonide 0 5 % External Cream; APPLY 2-3 TIMES DAILY TO    AFFECTED AREA(S); Therapy: 10RJY7315 to (Complete:96Huw9839)  Requested for: 59NUH2782; Last    Rx:08Mar2016 Ordered    Allergies    1   Naprosyn TABS    Signatures   Electronically signed by : Tatianna Dunbar, ; Mar 15 2016  3:40PM EST                       (Author)

## 2018-01-18 ENCOUNTER — APPOINTMENT (OUTPATIENT)
Dept: OCCUPATIONAL THERAPY | Age: 43
End: 2018-01-18
Attending: ORTHOPAEDIC SURGERY
Payer: MEDICARE

## 2018-01-18 LAB
EBV EA IGG SER-ACNC: 11.2 U/ML (ref 0–8.9)
EBV NA IGG SER IA-ACNC: 26.3 U/ML (ref 0–17.9)
EBV PATRN SPEC IB-IMP: ABNORMAL
EBV VCA IGG SER IA-ACNC: 56.5 U/ML (ref 0–17.9)
EBV VCA IGM SER IA-ACNC: <36 U/ML (ref 0–35.9)
HETEROPH AB SER QL: NEGATIVE

## 2018-01-18 NOTE — MISCELLANEOUS
Message   Recorded as Task   Date: 09/30/2016 09:16 AM, Created By: Santos Mcwilliams   Task Name: Med Renewal Request   Assigned To: [facility]   Regarding Patient: Alis Jasso, Status: In Progress   Comment:    Ileana Thompson - 30 Sep 2016 9:16 AM     TASK CREATED  Caller: Self; Renew Medication; [phone] (Home); [phone] (Work)  Received a Satnam Baer from the pt  today requesting a refill on the flexeril , last filled on 7/22 c/ one refill  HD to advise  thanks   Katherine Monreal - 30 Sep 2016 9:38 AM     TASK REPLIED TO: Previously Assigned To [facility] [facility]                      Refilled  Ileana Thompson - 30 Sep 2016 10:46 AM     TASK EDITED  Attempted to call the pt  and left a detailed mom in regards to the previous task  Pt  to CB c/ any questions  Ileana Thompson - 30 Sep 2016 10:46 AM     TASK IN PROGRESS        Active Problems    1  Abdominal pain, LLQ (left lower quadrant) (789 04) (R10 32)   2  Acute sinusitis (461 9) (J01 90)   3  Allergic rhinitis (477 9) (J30 9)   4  Analgesic use (V58 69) (Z79 899)   5  Bilateral hand pain (729 5) (M79 641,M79 642)   6  Cervical pain (723 1) (M54 2)   7  Cervical radiculopathy (723 4) (M54 12)   8  Closed displaced fracture of proximal phalanx of left ring finger, initial encounter (816 01)   (S62 615A)   9  Diarrhea (787 91) (R19 7)   10  Dysmenorrhea (625 3) (N94 6)   11  Eczema (692 9) (L30 9)   12  Encounter for long-term opiate analgesic use (V58 69) (Z79 891)   13  Encounter for screening mammogram for malignant neoplasm of breast (V76 12)    (Z12 31)   14  Esophageal reflux (530 81) (K21 9)   15  Finger fracture, left (816 00) (S62 609A)   16  Gastroduodenitis (535 50) (K29 90)   17  Generalized anxiety disorder (300 02) (F41 1)   18  Irritable bowel syndrome (564 1) (K58 9)   19  Myofacial muscle pain (729 1) (M79 1)   20  Non-toxic uninodular goiter (241 0) (E04 1)   21  Opioid dependence (304 00) (F11 20)   22   Pain in right shoulder (719 41) (M25 511)   23  Pain syndrome, chronic (338 4) (G89 4)   24  Paresthesias/numbness (782 0) (R20 9)   25  Stiffness of finger joint of left hand (719 54) (M25 642)   26  Suprascapular neuropathy (354 8) (G56 80)   27  Tendonitis Of The Left Shoulder (726 10)    Current Meds   1  Ambien 10 MG Oral Tablet (Zolpidem Tartrate); TAKE 1 TABLET AT  BEDTIME AS   NEEDED FOR INSOMNIA; Therapy: (Recorded:08Oct2015) to Recorded   2  Baclofen 10 MG Oral Tablet; take 1 tablet 3 times daily as needed for muscle spasm; Therapy: 51Qjr2080 to (Evaluate:20Oct2016)  Requested for: 32Aym1345; Last   Rx:01Dfr6212; Status: ACTIVE - Renewal Denied Ordered   3  Butrans 10 MCG/HR Transdermal Patch Weekly; APPLY 1 PATCH EVERY WEEK; Therapy: 29Apr2016 to (Evaluate:19Oct2016); Last Rx:68Bze2607 Ordered   4  Chlorhexidine Gluconate 4 % External Liquid; USE AS DIRECTED; Therapy: 01EMS7873 to (Last Rx:06Jun2016)  Requested for: 06Jun2016 Ordered   5  ClonazePAM 1 MG Oral Tablet; 1 tab up to 4 times daily  as needed; Therapy: (Recorded:06Hyn1880) to Recorded   6  [facility] Melatonin 10 MG Oral Capsule; Therapy: ([phone]) to Recorded   7  Cyclobenzaprine HCl - 5 MG Oral Tablet; TAKE 1 TABLET AT BEDTIME AS NEEDED; Therapy: 72GKA4463 to (Mckayla Mckenna)  Requested for: 34FWI6058; Last   Rx:30Sep2016; Status: ACTIVE - Renewal Denied Ordered   8  Effexor XR 75 MG Oral Capsule Extended Release 24 Hour (Venlafaxine HCl ER); take 1   capsule daily; Therapy: (Recorded:08Mar2016) to Recorded   9  Esomeprazole Magnesium 40 MG Oral Capsule Delayed Release (NexIUM); TAKE ONE   CAPSULE BY MOUTH EVERY DAY; Therapy: 13HFC4143 to (Evaluate:15Mar2017)  Requested for: 17Xay0599; Last   Rx:74Gwb7749 Ordered   10  Esomeprazole Magnesium 40 MG Oral Capsule Delayed Release; take 1 capsule daily; Therapy: 41JHJ7551 to (Evaluate:48Okw8527)  Requested for: 57RXG0497; Last    Rx:30Mar2016 Ordered   11   Fluticasone Propionate 50 MCG/ACT Nasal Suspension; instill 2 sprays into each nostril    once daily; Therapy: 80HDE8304 to [phone])  Requested for: 50ZNT8217; Last    Rx:08Mar2016 Ordered   12  Fluticasone Propionate 50 MCG/ACT Nasal Suspension; instill 2 sprays into each nostril    once daily; Therapy: 41JXU8055 to (Evaluate:92Jgv4433)  Requested for: 86Jnf8751; Last    Rx:13Ahy4434 Ordered   13  Gabapentin 100 MG Oral Capsule; TAKE 1 CAPSULE 3 TIMES DAILY **IN ADDITION     MG DOSE; Therapy: 06HBX8656 to (Evaluate:28Oct2016)  Requested for: 53Ler5550; Last    Rx:87Ovz1842 Ordered   14  Gabapentin 400 MG Oral Capsule; take 2 capsules 3 times daily; Therapy: 45OZN1621 to (Evaluate:28Oct2016)  Requested for: 45Kbr6708; Last    Rx:78Ydm8860 Ordered   15  Loratadine 10 MG Oral Tablet; TAKE 1 TABLET DAILY; Therapy: 17GLQ0100 to (Evaluate:29Mus4411)  Requested for: 22VGK2497; Last    Rx:08Mar2016 Ordered   16  Oxycodone-Acetaminophen 5-325 MG Oral Tablet; TAKE 1 TABLET EVERY 8 HOURS    AS NEEDED; Therapy: 41Mdh0327 to (Evaluate:38Prk9758); Last Rx:03Ayi3133 Ordered   17  Polyethylene Glycol 3350 Oral Powder; MIX 17 GM IN 8 OUNCES OF LIQUID AND    DRINK 1 TO 2 TIMES DAILY FOR CONSTIPATION; Therapy: 54NKK7021 to (Evaluate:20Oct2016)  Requested for: 35Gda8092; Last    Rx:15Dej6886 Ordered   18  Tegaderm Miscellaneous; USE AS DIRECTED; Therapy: 90LTA3427 to (Evaluate:72Fzo0795)  Requested for: 14VPT5834; Last    Rx:32Aho6954 Ordered    Allergies    1   Naprosyn TABS    Signatures   Electronically signed by : Alessio Small RN; Oct  4 2016  3:45PM EST                       (Author)

## 2018-01-19 LAB
B BURGDOR IGG SER IA-ACNC: 0.09
B BURGDOR IGM SER IA-ACNC: 0.2
BACTERIA UR CULT: ABNORMAL
RYE IGE QN: NEGATIVE

## 2018-01-22 ENCOUNTER — APPOINTMENT (OUTPATIENT)
Dept: OCCUPATIONAL THERAPY | Age: 43
End: 2018-01-22
Attending: ORTHOPAEDIC SURGERY
Payer: MEDICARE

## 2018-01-22 VITALS
HEIGHT: 63 IN | DIASTOLIC BLOOD PRESSURE: 62 MMHG | BODY MASS INDEX: 19.38 KG/M2 | HEART RATE: 54 BPM | SYSTOLIC BLOOD PRESSURE: 98 MMHG | WEIGHT: 109.38 LBS

## 2018-01-23 VITALS
HEART RATE: 53 BPM | BODY MASS INDEX: 18.98 KG/M2 | DIASTOLIC BLOOD PRESSURE: 67 MMHG | HEIGHT: 63 IN | WEIGHT: 107.13 LBS | SYSTOLIC BLOOD PRESSURE: 100 MMHG

## 2018-01-24 ENCOUNTER — OFFICE VISIT (OUTPATIENT)
Dept: PAIN MEDICINE | Facility: CLINIC | Age: 43
End: 2018-01-24
Payer: MEDICARE

## 2018-01-24 VITALS
BODY MASS INDEX: 18.78 KG/M2 | DIASTOLIC BLOOD PRESSURE: 82 MMHG | WEIGHT: 106 LBS | SYSTOLIC BLOOD PRESSURE: 116 MMHG | HEIGHT: 63 IN | RESPIRATION RATE: 14 BRPM | HEART RATE: 58 BPM | TEMPERATURE: 96.7 F

## 2018-01-24 VITALS
BODY MASS INDEX: 18.85 KG/M2 | WEIGHT: 106.4 LBS | TEMPERATURE: 99 F | DIASTOLIC BLOOD PRESSURE: 68 MMHG | HEIGHT: 63 IN | HEART RATE: 63 BPM | SYSTOLIC BLOOD PRESSURE: 101 MMHG

## 2018-01-24 DIAGNOSIS — M54.50 CHRONIC BILATERAL LOW BACK PAIN WITHOUT SCIATICA: ICD-10-CM

## 2018-01-24 DIAGNOSIS — M54.2 NECK PAIN: Primary | ICD-10-CM

## 2018-01-24 DIAGNOSIS — G89.29 CHRONIC BILATERAL LOW BACK PAIN WITHOUT SCIATICA: ICD-10-CM

## 2018-01-24 DIAGNOSIS — M79.18 MYOFASCIAL PAIN SYNDROME: ICD-10-CM

## 2018-01-24 DIAGNOSIS — G89.4 CHRONIC PAIN SYNDROME: ICD-10-CM

## 2018-01-24 PROBLEM — E04.1 THYROID NODULE: Status: ACTIVE | Noted: 2017-06-26

## 2018-01-24 PROBLEM — Z79.899 ANALGESIC USE: Status: ACTIVE | Noted: 2018-01-24

## 2018-01-24 PROBLEM — H61.21 IMPACTED CERUMEN OF RIGHT EAR: Status: ACTIVE | Noted: 2017-06-26

## 2018-01-24 PROBLEM — R63.1 EXCESSIVE THIRST: Status: ACTIVE | Noted: 2018-01-17

## 2018-01-24 PROBLEM — R05.3 CHRONIC COUGH: Status: ACTIVE | Noted: 2018-01-17

## 2018-01-24 PROBLEM — H91.90 HEARING LOSS: Status: ACTIVE | Noted: 2017-06-26

## 2018-01-24 PROBLEM — K52.9 GASTROENTERITIS: Status: ACTIVE | Noted: 2017-10-20

## 2018-01-24 PROBLEM — R68.89 COLD INTOLERANCE: Status: ACTIVE | Noted: 2018-01-17

## 2018-01-24 PROBLEM — K11.7 XEROSTOMIA DUE TO MOUTH BREATHING: Status: ACTIVE | Noted: 2017-07-26

## 2018-01-24 PROBLEM — M24.542 CONTRACTURE OF FINGER JOINT, LEFT: Status: ACTIVE | Noted: 2017-05-25

## 2018-01-24 PROBLEM — T84.84XA PAINFUL ORTHOPAEDIC HARDWARE (HCC): Status: ACTIVE | Noted: 2017-05-25

## 2018-01-24 PROBLEM — M79.646 FINGER PAIN: Status: ACTIVE | Noted: 2017-08-30

## 2018-01-24 PROBLEM — Z47.89 AFTERCARE FOLLOWING SURGERY OF THE MUSCULOSKELETAL SYSTEM: Status: ACTIVE | Noted: 2018-01-24

## 2018-01-24 PROBLEM — R06.5 XEROSTOMIA DUE TO MOUTH BREATHING: Status: ACTIVE | Noted: 2017-07-26

## 2018-01-24 PROBLEM — E86.0 LUETSCHER'S SYNDROME: Status: ACTIVE | Noted: 2018-01-17

## 2018-01-24 PROBLEM — K21.9 ACID REFLUX DISEASE: Status: ACTIVE | Noted: 2017-07-29

## 2018-01-24 PROBLEM — R53.83 FATIGUE: Status: ACTIVE | Noted: 2018-01-17

## 2018-01-24 PROBLEM — Z79.891 ENCOUNTER FOR LONG-TERM OPIATE ANALGESIC USE: Status: ACTIVE | Noted: 2018-01-24

## 2018-01-24 PROCEDURE — 99214 OFFICE O/P EST MOD 30 MIN: CPT | Performed by: NURSE PRACTITIONER

## 2018-01-24 RX ORDER — TRIAMCINOLONE ACETONIDE 5 MG/G
30 CREAM TOPICAL 3 TIMES DAILY
COMMUNITY
Start: 2016-03-08

## 2018-01-24 RX ORDER — BACLOFEN 10 MG/1
1 TABLET ORAL 3 TIMES DAILY PRN
COMMUNITY
Start: 2015-12-29 | End: 2018-03-29 | Stop reason: SDUPTHER

## 2018-01-24 RX ORDER — BACLOFEN 10 MG/1
10 TABLET ORAL 3 TIMES DAILY
Qty: 90 TABLET | Refills: 0 | Status: SHIPPED | OUTPATIENT
Start: 2018-01-24 | End: 2018-01-24 | Stop reason: SDUPTHER

## 2018-01-24 RX ORDER — PREGABALIN 75 MG/1
75 CAPSULE ORAL 4 TIMES DAILY
Qty: 120 CAPSULE | Refills: 1 | Status: SHIPPED | OUTPATIENT
Start: 2018-01-24 | End: 2018-02-09 | Stop reason: SDUPTHER

## 2018-01-24 RX ORDER — FLUTICASONE PROPIONATE 50 MCG
2 SPRAY, SUSPENSION (ML) NASAL DAILY
COMMUNITY
Start: 2015-10-08 | End: 2018-01-24

## 2018-01-24 RX ORDER — ZOLPIDEM TARTRATE 10 MG/1
1 TABLET ORAL
COMMUNITY
End: 2018-02-09 | Stop reason: SDUPTHER

## 2018-01-24 RX ORDER — BUPRENORPHINE 10 UG/H
1 PATCH TRANSDERMAL WEEKLY
Qty: 4 PATCH | Refills: 1 | Status: SHIPPED | OUTPATIENT
Start: 2018-01-24 | End: 2018-01-24 | Stop reason: SDUPTHER

## 2018-01-24 RX ORDER — BUPRENORPHINE 10 UG/H
1 PATCH TRANSDERMAL
COMMUNITY
Start: 2016-04-29 | End: 2018-03-29 | Stop reason: SDUPTHER

## 2018-01-24 RX ORDER — FAMOTIDINE 20 MG/1
1 TABLET, FILM COATED ORAL
COMMUNITY
Start: 2018-01-17 | End: 2018-02-14 | Stop reason: SDUPTHER

## 2018-01-24 RX ORDER — CEFPROZIL 500 MG/1
1 TABLET, FILM COATED ORAL 2 TIMES DAILY
COMMUNITY
Start: 2018-01-18 | End: 2018-01-25

## 2018-01-24 RX ORDER — CLONAZEPAM 1 MG/1
1 TABLET ORAL 4 TIMES DAILY PRN
COMMUNITY

## 2018-01-24 RX ORDER — PREGABALIN 75 MG/1
75 CAPSULE ORAL 4 TIMES DAILY
COMMUNITY
Start: 2017-11-29 | End: 2018-03-29 | Stop reason: SDUPTHER

## 2018-01-24 RX ORDER — ESOMEPRAZOLE MAGNESIUM 40 MG/1
1 CAPSULE, DELAYED RELEASE ORAL DAILY
COMMUNITY
Start: 2016-03-08 | End: 2018-06-18 | Stop reason: SDUPTHER

## 2018-01-24 RX ORDER — VENLAFAXINE HYDROCHLORIDE 75 MG/1
1 CAPSULE, EXTENDED RELEASE ORAL DAILY
COMMUNITY
End: 2019-04-23

## 2018-01-24 RX ORDER — BACLOFEN 10 MG/1
10 TABLET ORAL 3 TIMES DAILY
Qty: 90 TABLET | Refills: 1 | Status: SHIPPED | OUTPATIENT
Start: 2018-01-24 | End: 2018-01-24 | Stop reason: SDUPTHER

## 2018-01-24 RX ORDER — BUPRENORPHINE 10 UG/H
1 PATCH TRANSDERMAL WEEKLY
Qty: 4 PATCH | Refills: 0 | Status: SHIPPED | OUTPATIENT
Start: 2018-01-24 | End: 2018-01-24

## 2018-01-24 NOTE — PROGRESS NOTES
Assessment:  1  Neck pain - Bilateral    2  Chronic bilateral low back pain without sciatica    3  Chronic pain syndrome - Bilateral    4  Myofascial pain syndrome - Bilateral        Plan:   patient and her mother in the office today, I discussed with them that at this point time since feels that the Lyrica provides more relief than the gabapentin, but is making her significantly tired, for the next 2 weeks I would like her to just keep the Lyrica at the t i d  Dosing and then try to increase it back up to q i d  Dosing  With regards to the Butrans patch, explained to the patient and her mother that this point time since she is noting moderate stable relief and I feel that it is appropriate for her underlying etiology, for now, we will continue the patch as prescribed  However, our goal in the near future would be to decrease the patch down to the 5 mcg dosage  I advised the patient that if they experience any side effects or issues with the changes in their medication regiment, they should give our office a call to discuss  I also advised the patient not to drive or operate machinery until they see how the changes in the medication regimen affects them  The patient was agreeable and verbalized an understanding  I encouraged the patient to follow up with her gastroenterologist regarding her endoscopy  [place] Prescription Drug Monitoring Program report was reviewed and was appropriate         There are risks associated with opioid medications, including dependence, addiction and tolerance  The patient understands and agrees to use these medications only as prescribed  Potential side effects of the medications include, but are not limited to, constipation, drowsiness, addiction, impaired judgment and risk of fatal overdose if not taken as prescribed  The patient was warned against driving while taking sedation medications  Sharing medications is a felony   At this point in time, the patient is showing no signs of addiction, abuse, diversion or suicidal ideation  The patient will follow-up in 8 weeks for medication prescription refill and reevaluation  The patient was advised to contact the office should their symptoms worsen in the interim  The patient was agreeable and verbalized an understanding  History of Present Illness: The patient is a 39 y o  female last seen on 11/29/2017 who presents for a follow up office visit in regards to chronic pain secondary to  Chronic neck pain with neuropathy and low back pain  The patient currently reports  That she has seen improvement since her last office visit with the changes made to her medication regimen as she has titrated off of the gabapentin as now on Lyrica, however, she reports that when she takes it q i d  It makes her significantly more tired and for the most part she has only been taking it 3 times a day most days  She also continues with her 10 mcg Butrans patch  Current pain medications includes:    The patient reports that this regimen is providing 60% pain relief  The patient is reporting no side effects, sleepiness from this pain medication regimen  Pain Contract Signed: 11/29/2017  Last Urine Drug Screen: 08/22/2017    I have personally reviewed and/or updated the patient's past medical history, past surgical history, family history, social history, current medications, allergies, and vital signs today  Review of Systems:    Review of Systems   Gastrointestinal: Positive for constipation  Neurological: Positive for dizziness  All other systems reviewed and are negative          Past Medical History:   Diagnosis Date    Anxiety     Arthritis     Right shoulder    Chronic pain disorder     Bilateral shoulders    Depression     Thyroid nodule     left side       Past Surgical History:   Procedure Laterality Date    APPENDECTOMY      DENTAL SURGERY      Dental implants    ORIF FINGER FRACTURE Left 6/7/2016    Procedure: RING FINGER PROXIMAL PHALANX FRACTURE O/R I/F ;  Surgeon: Ashley Ortez MD;  [facility];  Service:     SD OPEN TX PHALANGEAL SHAFT FRACTURE PROX/MIDDLE EA Left 10/12/2016    Procedure: RING FINGER MIDDLE PHALANX FRACTURE OPEN REDUCTION W/ INTERNAL FIXATION, AND CONTRACTURE RELEASE OF PIP JOINT ;  Surgeon: Ashley Ortez MD;  [facility];  Service: Orthopedics    SD REMOVAL DEEP IMPLANT Left 10/26/2017    Procedure: RING FINGER HARDWARE REMOVAL;  Surgeon: Dick Wilson MD;  [facility];  Service: Orthopedics    SHOULDER SURGERY      bialt    WISDOM TOOTH EXTRACTION Bilateral        Family History   Problem Relation Age of Onset   Earna Yoalnda Hypertension Mother     Asthma Mother     GI problems Mother     No Known Problems Father     No Known Problems Brother     No Known Problems Brother        Social History     Occupational History    Not on file  Social History Main Topics    Smoking status: Former Smoker     Years: 5 00    Smokeless tobacco: Never Used      Comment: Quit 3 years ago      Alcohol use No    Drug use: No    Sexual activity: Not on file         Current Outpatient Prescriptions:     baclofen 10 mg tablet, Take 1 tablet by mouth 3 (three) times a day as needed, Disp: , Rfl:     Buprenorphine (BUTRANS) 10 MCG/HR PTWK, Place 1 patch on the skin every 7 days, Disp: , Rfl:     cefprosil (CEFZIL) 500 MG tablet, Take 1 tablet by mouth 2 (two) times a day, Disp: , Rfl:     diphenhydrAMINE (BENADRYL) 12 5 mg/5 mL oral liquid, Take by mouth daily at bedtime as needed for allergies, Disp: , Rfl:     esomeprazole (NexIUM) 40 MG capsule, Take 1 capsule by mouth daily, Disp: , Rfl:     famotidine (PEPCID) 20 mg tablet, Take 1 tablet by mouth, Disp: , Rfl:     POLYETH GLYC-PROPYLENE GLYC NA, Take 8 oz by mouth 2 (two) times a day as needed, Disp: , Rfl:     pregabalin (LYRICA) 75 mg capsule, Take 75 mg by mouth 4 (four) times a day, Disp: , Rfl:     triamcinolone (KENALOG) 0 5 % cream, Apply 30 application topically 3 (three) times a day, Disp: , Rfl:     clonazePAM (KlonoPIN) 1 mg tablet, Take 1 mg by mouth 4 (four) times a day as needed, Disp: , Rfl:     fluticasone (FLONASE) 50 mcg/act nasal spray, 2 sprays into each nostril daily at bedtime as needed  , Disp: , Rfl:     nicotine polacrilex (NICOTINE MINI) 4 MG lozenge, Apply 4 mg to the mouth or throat as needed for smoking cessation, Disp: , Rfl:     pregabalin (LYRICA) 75 mg capsule, Take 1 capsule by mouth 4 (four) times a day Take 1 pill TID x 2 weeks, then increase to 1 PO QID, Disp: 120 capsule, Rfl: 1    venlafaxine (EFFEXOR XR) 75 mg 24 hr capsule, Take 1 capsule by mouth daily, Disp: , Rfl:     zolpidem (AMBIEN) 10 mg tablet, Take 10 mg by mouth daily at bedtime as needed for sleep, Disp: , Rfl:     zolpidem (AMBIEN) 10 mg tablet, Take 1 tablet by mouth, Disp: , Rfl:     Allergies   Allergen Reactions    Naproxen GI Intolerance    Vancomycin Hives and Itching       Physical Exam:    /68   Pulse 63   Temp 99 °F (37 2 °C)   Ht 5' 3" (1 6 m)   Wt 48 3 kg (106 lb 6 4 oz)   BMI 18 85 kg/m²     Constitutional:normal, well developed, well nourished, alert, in no distress and non-toxic and no overt pain behavior  and underweight  Eyes:anicteric  HEENT:grossly intact  Neck:supple, symmetric, trachea midline and no masses   Pulmonary:even and unlabored  Cardiovascular:No edema or pitting edema present  Skin:Normal without rashes or lesions and well hydrated  Psychiatric:Mood and affect appropriate  Neurologic:Cranial Nerves II-XII grossly intact  Musculoskeletal:normal      Imaging  No orders to display         No orders of the defined types were placed in this encounter

## 2018-01-25 ENCOUNTER — APPOINTMENT (OUTPATIENT)
Dept: OCCUPATIONAL THERAPY | Age: 43
End: 2018-01-25
Attending: ORTHOPAEDIC SURGERY
Payer: MEDICARE

## 2018-01-29 ENCOUNTER — APPOINTMENT (OUTPATIENT)
Dept: OCCUPATIONAL THERAPY | Age: 43
End: 2018-01-29
Attending: ORTHOPAEDIC SURGERY
Payer: MEDICARE

## 2018-02-01 ENCOUNTER — LAB REQUISITION (OUTPATIENT)
Dept: LAB | Facility: HOSPITAL | Age: 43
End: 2018-02-01
Payer: MEDICARE

## 2018-02-01 DIAGNOSIS — K21.9 GASTRO-ESOPHAGEAL REFLUX DISEASE WITHOUT ESOPHAGITIS: ICD-10-CM

## 2018-02-01 DIAGNOSIS — D12.0 BENIGN NEOPLASM OF CECUM: ICD-10-CM

## 2018-02-01 DIAGNOSIS — R19.4 CHANGE IN BOWEL HABITS: ICD-10-CM

## 2018-02-01 PROCEDURE — 88305 TISSUE EXAM BY PATHOLOGIST: CPT | Performed by: INTERNAL MEDICINE

## 2018-02-01 PROCEDURE — 88305 TISSUE EXAM BY PATHOLOGIST: CPT | Performed by: PATHOLOGY

## 2018-02-08 ENCOUNTER — TELEPHONE (OUTPATIENT)
Dept: FAMILY MEDICINE CLINIC | Facility: CLINIC | Age: 43
End: 2018-02-08

## 2018-02-08 NOTE — TELEPHONE ENCOUNTER
Patient called questioning her bloodwork results that are in My Chart from January 17, 2018 if someone can give her a call and go over the results with her  Patient can be reached at [phone]

## 2018-02-08 NOTE — TELEPHONE ENCOUNTER
Spoke with pt, she is states she was on MY Chart saw her results- and is still extremely fatigued, elevated temperature at hs-takes Tylenol for fever  Wakes us with diaphoresis, loose stools  Does not have an appt February 15th with you  How would you like to proceed?

## 2018-02-09 ENCOUNTER — OFFICE VISIT (OUTPATIENT)
Dept: FAMILY MEDICINE CLINIC | Facility: CLINIC | Age: 43
End: 2018-02-09
Payer: MEDICARE

## 2018-02-09 VITALS
WEIGHT: 109.6 LBS | HEIGHT: 63 IN | HEART RATE: 76 BPM | SYSTOLIC BLOOD PRESSURE: 94 MMHG | TEMPERATURE: 96.5 F | DIASTOLIC BLOOD PRESSURE: 58 MMHG | BODY MASS INDEX: 19.42 KG/M2

## 2018-02-09 DIAGNOSIS — R30.0 DYSURIA: Primary | ICD-10-CM

## 2018-02-09 DIAGNOSIS — R53.83 FATIGUE, UNSPECIFIED TYPE: ICD-10-CM

## 2018-02-09 DIAGNOSIS — K52.9 CHRONIC DIARRHEA: ICD-10-CM

## 2018-02-09 LAB
BILIRUB UR QL STRIP: NEGATIVE
CLARITY UR: NORMAL
COLOR UR: YELLOW
GLUCOSE UR STRIP-MCNC: NEGATIVE MG/DL
HGB UR QL STRIP.AUTO: NEGATIVE
KETONES UR STRIP-MCNC: NEGATIVE MG/DL
LEUKOCYTE ESTERASE UR QL STRIP: NEGATIVE
NITRITE UR QL STRIP: NEGATIVE
PH UR STRIP.AUTO: 6 [PH] (ref 4.5–8)
PROT UR QL STRIP: NORMAL
PROT UR STRIP-MCNC: NEGATIVE MG/DL
SL AMB  POCT GLUCOSE, UA: NORMAL
SL AMB LEUKOCYTE ESTERASE,UA: NORMAL
SL AMB POCT BILIRUBIN,UA: NORMAL
SL AMB POCT BLOOD,UA: NORMAL
SL AMB POCT CLARITY,UA: NORMAL
SL AMB POCT COLOR,UA: NORMAL
SL AMB POCT KETONES,UA: NORMAL
SL AMB POCT NITRITE,UA: NORMAL
SL AMB POCT PH,UA: 6
SL AMB POCT SPECIFIC GRAVITY,UA: 1.01
SL AMB POCT UROBILINOGEN: 0.2
SP GR UR STRIP.AUTO: 1.03 (ref 1–1.03)
UROBILINOGEN UR QL STRIP.AUTO: 0.2 E.U./DL

## 2018-02-09 PROCEDURE — 99214 OFFICE O/P EST MOD 30 MIN: CPT | Performed by: FAMILY MEDICINE

## 2018-02-09 PROCEDURE — 81002 URINALYSIS NONAUTO W/O SCOPE: CPT | Performed by: FAMILY MEDICINE

## 2018-02-09 PROCEDURE — 87086 URINE CULTURE/COLONY COUNT: CPT | Performed by: FAMILY MEDICINE

## 2018-02-09 PROCEDURE — 81003 URINALYSIS AUTO W/O SCOPE: CPT | Performed by: FAMILY MEDICINE

## 2018-02-09 NOTE — PROGRESS NOTES
FAMILY PRACTICE OFFICE VISIT       NAME: Karissa Sylvester  AGE: 39 y o  SEX: female       : 1975        MRN: [id number]    DATE: 2018  TIME: 8:57 AM    Assessment and Plan     Problem List Items Addressed This Visit     Fatigue     Fatigue  I suspect patient's fatigue is multifactorial   Patient will also be sent for lactose intolerance testing  She had a positive EBV titer that was in the convalescent stage of healing  Chronic diarrhea     Chronic diarrhea  Patient given prescription to obtain stool cultures for ova and parasite, WBCs, C difficile, and stool culture  We will make further recommendations pending results of test            Other Visit Diagnoses     Dysuria    -  Primary    Relevant Orders    UA (URINE) with reflex to Microscopic    Urine culture    POCT urine dip            There are no Patient Instructions on file for this visit  Chief Complaint     Chief Complaint   Patient presents with    Follow-up     Pt is feeling fatigued and would like to review results of tests  History of Present Illness     Patient complains of chronic fatigue since 2017  She also complains of very loose watery diarrhea at least twice daily with or without food  She feels she is unable to tolerate her Ensure drinks and she has in the past   She had a recent colonoscopy and endoscopy that were unremarkable with negative biopsies  Her gastroenterologist suggested checking for lactose intolerance  Review of Systems   Review of Systems   Constitutional: Positive for fatigue  Patient describes chronic fatigue since 2017  She experiences night sweats on a regular basis  She denies any documented fevers   HENT: Negative  Respiratory: Negative  Cardiovascular: Negative  Gastrointestinal:        Patient describes decreased appetite and watery diarrhea at least twice daily  She feels sstiated after small amount of food    She denies nausea or vomiting   Genitourinary: Negative  Musculoskeletal: Positive for myalgias  Skin: Negative for rash         Active Problem List     Patient Active Problem List   Diagnosis    Acid reflux disease    Aftercare following surgery of the musculoskeletal system    Allergic rhinitis    Analgesic use    Avulsion fracture of middle phalanx of finger, closed, initial encounter    Bilateral hand pain    Cervical radiculopathy    Chronic cervical pain    Chronic cough    Chronic myofascial pain    Closed displaced fracture of proximal phalanx of left ring finger    Cold intolerance    Contracture of finger joint, left    Luetscher's syndrome    Eczema    Encounter for long-term opiate analgesic use    Esophageal reflux    Fatigue    Finger fracture, left    Finger pain    Gastroenteritis    Generalized anxiety disorder    Hearing loss    Impacted cerumen of right ear    Irritable bowel syndrome    Non-toxic uninodular goiter    Opioid dependence (HCC)    Pain in right shoulder    Pain syndrome, chronic    Painful orthopaedic hardware (HCC)    Paresthesias/numbness    Excessive thirst    Stiffness of finger joint of left hand    Suprascapular neuropathy    Disorder of bursae and tendons in shoulder region    Thyroid nodule    Xerostomia due to mouth breathing    Neck pain - Bilateral    Chronic bilateral low back pain without sciatica    Chronic pain syndrome - Bilateral    Myofascial pain syndrome - Bilateral    Chronic diarrhea       Past Medical History:  Past Medical History:   Diagnosis Date    Anxiety     Arthritis     Right shoulder    Chronic pain disorder     Bilateral shoulders    Depression     Thyroid nodule     left side       Past Surgical History:  Past Surgical History:   Procedure Laterality Date    APPENDECTOMY      DENTAL SURGERY      Dental implants    ORIF FINGER FRACTURE Left 6/7/2016    Procedure: RING FINGER PROXIMAL PHALANX FRACTURE O/R I/F ; Surgeon: Briscoe Osgood, MD;  [facility];  Service:     OK OPEN TX PHALANGEAL SHAFT FRACTURE PROX/MIDDLE EA Left 10/12/2016    Procedure: RING FINGER MIDDLE PHALANX FRACTURE OPEN REDUCTION W/ INTERNAL FIXATION, AND CONTRACTURE RELEASE OF PIP JOINT ;  Surgeon: Briscoe Osgood, MD;  [facility];  Service: Orthopedics    OK REMOVAL DEEP IMPLANT Left 10/26/2017    Procedure: RING FINGER HARDWARE REMOVAL;  Surgeon: Es Zuniga MD;  Location:  MAIN OR;  Service: Orthopedics    SHOULDER SURGERY      bialt    WISDOM TOOTH EXTRACTION Bilateral        Family History:  Family History   Problem Relation Age of Onset   June Xie Hypertension Mother     Asthma Mother     GI problems Mother     No Known Problems Father     No Known Problems Brother     No Known Problems Brother        Social History:  Social History     Social History    Marital status: Single     Spouse name: N/A    Number of children: N/A    Years of education: N/A     Occupational History    Not on file  Social History Main Topics    Smoking status: Former Smoker     Years: 5 00    Smokeless tobacco: Never Used      Comment: Quit 3 years ago   Alcohol use No    Drug use: No    Sexual activity: Not on file     Other Topics Concern    Not on file     Social History Narrative    No narrative on file       Objective     Vitals:    02/09/18 0751   BP: 94/58   Pulse: 76   Temp: (!) 96 5 °F (35 8 °C)     Wt Readings from Last 3 Encounters:   02/09/18 49 7 kg (109 lb 9 6 oz)   01/24/18 48 3 kg (106 lb 6 4 oz)   01/17/18 48 1 kg (106 lb)       Physical Exam   Constitutional:   Patient in no acute distress   HENT:   Right Ear: External ear normal    Left Ear: External ear normal    Mouth/Throat: Oropharynx is clear and moist  No oropharyngeal exudate  Cardiovascular: Normal rate, regular rhythm and normal heart sounds  No murmur heard    Pulmonary/Chest:   Lungs were clear to auscultation with negative wheezes rales or rhonchi   Abdominal:   Abdomen was soft and minimally tender generalized areas  Negative guarding or rebound  Negative masses palpated  Lymphadenopathy:     She has no cervical adenopathy  Skin: No rash noted  Pertinent Laboratory/Diagnostic Studies:  Lab Results   Component Value Date    GLUCOSE 100 01/18/2017    BUN 16 01/17/2018    CREATININE 0 94 01/17/2018    CALCIUM 9 3 01/17/2018     01/17/2018    K 4 0 01/17/2018    CO2 31 01/17/2018     01/17/2018     Lab Results   Component Value Date    ALT 23 01/17/2018    AST 13 01/17/2018    ALKPHOS 50 01/17/2018    BILITOT 0 41 01/17/2018       Lab Results   Component Value Date    WBC 7 66 01/17/2018    HGB 13 1 01/17/2018    HCT 39 7 01/17/2018    MCV 95 01/17/2018     01/17/2018       No results found for: TSH    No results found for: CHOL  No results found for: TRIG  No results found for: HDL  No results found for: [address]  Lab Results   Component Value Date    HGBA1C 5 5 01/17/2018       Results for orders placed or performed in visit on 02/01/18   Tissue Exam   Result Value Ref Range    Case Report       Surgical Pathology Report                         Case: [id number]                                   Authorizing Provider:  Anjana Belle MD           Collected:           02/01/2018 1030              Pathologist:           Ryan Pool MD       Received:            02/01/2018 1348              Specimens:   A) - Duodenum                                                                                       B) - Colon, Random colon                                                                            C) - Polyp, Colorectal, Sigmoid colon                                                      Final Diagnosis       A  Duodenum, biopsy:     - No significant pathologic abnormalities       - No villous atrophy, increased intraepithelial lymphocytes or crypt hyperplasia to suggest       malabsorptive enteropathy      - No active inflammation, granulomas, organisms, dysplasia or neoplasia identified  B   Colon, random biopsies:     - No significant pathologic abnormalities  - No active inflammation or histologic evidence of a microscopic (lymphocytic) or collagenous colitis noted  - No granulomas, dysplasia or neoplasia identified  C   Colon, sigmoid, polyp:     - Tubular adenoma  - No high grade dysplasia or carcinoma identified  Additional Information       All controls performed with the immunohistochemical stains reported above reacted appropriately  These tests were developed and their performance characteristics determined by [address] [place] [address] or [address] [place] [address]  They may not be cleared or approved by the [place]  Food and Drug Administration  The FDA has determined that such clearance or approval is not necessary  These tests are used for clinical purposes  They should not be regarded as investigational or for research  This laboratory has been approved by [place] [address], designated as a high-complexity laboratory and is qualified to perform these tests  - Interpretation performed at [facility], [address] [place] [address]       Gross Description       A  The specimen is received in formalin, labeled with the patient's name and hospital number, and is designated "duodenum  The specimen consists of 2 tan soft tissue fragments measuring 0 3 and 0 4 cm in greatest dimension  Entirely submitted  One cassette  B  The specimen is received in formalin, labeled with the patient's name and hospital number, and is designated "random colon biopsy  The specimen consists of multiple tan soft tissue fragments measuring in loose aggregate 1 0 x 0 7 x 0 2 cm  Entirely submitted  One cassette  C  The specimen is received in formalin, labeled with the patient's name and hospital number, and is designated "polypectomy in the sigmoid colon    The specimen consists of 2 tan soft tissue fragments measuring less than 0 1 and 0 2 cm in greatest dimension  Due to the size and consistency of the smallest fragment it is questionable whether it will survive processing  Entirely submitted  One cassette  Note: The estimated total formalin fixation time based upon information provided by the submitting clinician and the standard processing schedule is under 72 hours    AKemmerer         Orders Placed This Encounter   Procedures    Urine culture    UA (URINE) with reflex to Microscopic    POCT urine dip       ALLERGIES:  Allergies   Allergen Reactions    Naproxen GI Intolerance    Vancomycin Hives and Itching       Current Medications     Current Outpatient Prescriptions   Medication Sig Dispense Refill    baclofen 10 mg tablet Take 1 tablet by mouth 3 (three) times a day as needed      Buprenorphine (BUTRANS) 10 MCG/HR PTWK Place 1 patch on the skin every 7 days      clonazePAM (KlonoPIN) 1 mg tablet Take 1 mg by mouth 4 (four) times a day as needed      diphenhydrAMINE (BENADRYL) 12 5 mg/5 mL oral liquid Take by mouth daily at bedtime as needed for allergies      esomeprazole (NexIUM) 40 MG capsule Take 1 capsule by mouth daily      famotidine (PEPCID) 20 mg tablet Take 1 tablet by mouth      fluticasone (FLONASE) 50 mcg/act nasal spray 2 sprays into each nostril daily at bedtime as needed        nicotine polacrilex (NICOTINE MINI) 4 MG lozenge Apply 4 mg to the mouth or throat as needed for smoking cessation      POLYETH GLYC-PROPYLENE GLYC NA Take 8 oz by mouth 2 (two) times a day as needed      pregabalin (LYRICA) 75 mg capsule Take 75 mg by mouth 4 (four) times a day      triamcinolone (KENALOG) 0 5 % cream Apply 30 application topically 3 (three) times a day      venlafaxine (EFFEXOR XR) 75 mg 24 hr capsule Take 1 capsule by mouth daily      zolpidem (AMBIEN) 10 mg tablet Take 10 mg by mouth daily at bedtime as needed for sleep       No current facility-administered medications for this visit  Health Maintenance     Health Maintenance   Topic Date Due    HIV SCREENING  1975    SLP PLAN OF CARE  1975    DTaP,Tdap,and Td Vaccines (1 - Tdap) 01/08/1981    PAP SMEAR  03/13/2017    INFLUENZA VACCINE  09/01/2017       There is no immunization history on file for this patient      Alexa Cancino MD

## 2018-02-09 NOTE — ASSESSMENT & PLAN NOTE
Chronic diarrhea  Patient given prescription to obtain stool cultures for ova and parasite, WBCs, C difficile, and stool culture    We will make further recommendations pending results of test

## 2018-02-09 NOTE — ASSESSMENT & PLAN NOTE
Fatigue  I suspect patient's fatigue is multifactorial   Patient will also be sent for lactose intolerance testing  She had a positive EBV titer that was in the convalescent stage of healing

## 2018-02-10 LAB — BACTERIA UR CULT: NORMAL

## 2018-02-12 ENCOUNTER — TELEPHONE (OUTPATIENT)
Dept: FAMILY MEDICINE CLINIC | Facility: CLINIC | Age: 43
End: 2018-02-12

## 2018-02-12 NOTE — TELEPHONE ENCOUNTER
----- Message from VisiQuatess sent at 2/9/2018 10:16 PM EST -----  Regarding: Test Results Question  Contact: [phone]  I have a question about URINALYSIS AUTO ONLY resulted on 2/9/18 at 9:27 PM   Is this now a within normal limits?

## 2018-02-14 DIAGNOSIS — K21.9 CHRONIC GERD: Primary | ICD-10-CM

## 2018-02-14 RX ORDER — FAMOTIDINE 20 MG/1
TABLET, FILM COATED ORAL
Qty: 30 TABLET | Refills: 1 | Status: SHIPPED | OUTPATIENT
Start: 2018-02-14 | End: 2018-03-08 | Stop reason: SDUPTHER

## 2018-02-19 ENCOUNTER — TELEPHONE (OUTPATIENT)
Dept: FAMILY MEDICINE CLINIC | Facility: CLINIC | Age: 43
End: 2018-02-19

## 2018-02-19 NOTE — TELEPHONE ENCOUNTER
----- Message from Vel García MD sent at 2/17/2018  8:51 PM EST -----  Regarding: FW: Test Results Question  Contact: [phone]  Nurses,    Please assist patient with her questions about results, EGD was performed by Gastroenterology ; results of EGD should be discussed with gastroenterologist     I am not quite sure about [employer] navigation   Thanks   ----- Message -----  From: Savi Bates  Sent: 2/17/2018   9:55 AM  To: Vel García MD  Subject: FW: Test Results Question                            ----- Message -----  From: Yogesh Mail  Sent: 2/17/2018   2:03 AM  To: [facility]  Subject: Test Results Question                            I have a question about EGD resulted on 2/10/18  I cannot get this result through the huang  This is telling me to go to the website,  but I  do not have a username  Can I please find out the results  Thank you so much  Dimitris Wilkerson

## 2018-02-28 ENCOUNTER — APPOINTMENT (OUTPATIENT)
Dept: LAB | Facility: HOSPITAL | Age: 43
End: 2018-02-28
Payer: MEDICARE

## 2018-02-28 ENCOUNTER — TRANSCRIBE ORDERS (OUTPATIENT)
Dept: LAB | Facility: HOSPITAL | Age: 43
End: 2018-02-28

## 2018-02-28 DIAGNOSIS — K52.9 CHRONIC DIARRHEA: Primary | ICD-10-CM

## 2018-02-28 PROCEDURE — 87493 C DIFF AMPLIFIED PROBE: CPT

## 2018-02-28 PROCEDURE — 89055 LEUKOCYTE ASSESSMENT FECAL: CPT

## 2018-02-28 PROCEDURE — 87177 OVA AND PARASITES SMEARS: CPT

## 2018-02-28 PROCEDURE — 87209 SMEAR COMPLEX STAIN: CPT

## 2018-02-28 PROCEDURE — 87505 NFCT AGENT DETECTION GI: CPT

## 2018-03-01 ENCOUNTER — TELEPHONE (OUTPATIENT)
Dept: FAMILY MEDICINE CLINIC | Facility: CLINIC | Age: 43
End: 2018-03-01

## 2018-03-01 LAB
C DIFF TOX GENS STL QL NAA+PROBE: NORMAL
CAMPYLOBACTER DNA SPEC NAA+PROBE: NORMAL
SALMONELLA DNA SPEC QL NAA+PROBE: NORMAL
SHIGA TOXIN STX GENE SPEC NAA+PROBE: NORMAL
SHIGELLA DNA SPEC QL NAA+PROBE: NORMAL

## 2018-03-01 NOTE — TELEPHONE ENCOUNTER
----- Message from Tate Alaniz MD sent at 3/1/2018 12:44 PM EST -----  Please contact patient    Stool testing for C diff was negative/normal

## 2018-03-03 DIAGNOSIS — K21.9 CHRONIC GERD: ICD-10-CM

## 2018-03-03 RX ORDER — FAMOTIDINE 20 MG/1
20 TABLET, FILM COATED ORAL
Qty: 30 TABLET | Refills: 0 | Status: CANCELLED | OUTPATIENT
Start: 2018-03-03

## 2018-03-05 LAB
O+P STL CONC: NORMAL
WBC SPEC QL GRAM STN: NORMAL

## 2018-03-07 DIAGNOSIS — K21.9 CHRONIC GERD: ICD-10-CM

## 2018-03-07 RX ORDER — FAMOTIDINE 20 MG/1
20 TABLET, FILM COATED ORAL
Qty: 30 TABLET | Refills: 0 | Status: CANCELLED | OUTPATIENT
Start: 2018-03-07

## 2018-03-08 DIAGNOSIS — K21.9 CHRONIC GERD: ICD-10-CM

## 2018-03-08 RX ORDER — FAMOTIDINE 20 MG/1
20 TABLET, FILM COATED ORAL
Qty: 30 TABLET | Refills: 3 | Status: SHIPPED | OUTPATIENT
Start: 2018-03-08 | End: 2018-09-18 | Stop reason: SDUPTHER

## 2018-03-29 ENCOUNTER — CLINICAL SUPPORT (OUTPATIENT)
Dept: PAIN MEDICINE | Facility: CLINIC | Age: 43
End: 2018-03-29
Payer: MEDICARE

## 2018-03-29 VITALS
HEIGHT: 63 IN | SYSTOLIC BLOOD PRESSURE: 110 MMHG | TEMPERATURE: 97.7 F | HEART RATE: 51 BPM | BODY MASS INDEX: 18.57 KG/M2 | DIASTOLIC BLOOD PRESSURE: 72 MMHG | WEIGHT: 104.8 LBS

## 2018-03-29 DIAGNOSIS — IMO0001 PARESTHESIAS/NUMBNESS: ICD-10-CM

## 2018-03-29 DIAGNOSIS — G89.29 CHRONIC RIGHT SHOULDER PAIN: ICD-10-CM

## 2018-03-29 DIAGNOSIS — M25.511 CHRONIC RIGHT SHOULDER PAIN: ICD-10-CM

## 2018-03-29 DIAGNOSIS — G89.4 PAIN SYNDROME, CHRONIC: ICD-10-CM

## 2018-03-29 DIAGNOSIS — G89.29 CHRONIC LEFT SHOULDER PAIN: ICD-10-CM

## 2018-03-29 DIAGNOSIS — M25.512 CHRONIC LEFT SHOULDER PAIN: ICD-10-CM

## 2018-03-29 DIAGNOSIS — G89.4 CHRONIC PAIN SYNDROME: Primary | ICD-10-CM

## 2018-03-29 DIAGNOSIS — M79.18 MYOFASCIAL PAIN: ICD-10-CM

## 2018-03-29 DIAGNOSIS — M54.2 CERVICALGIA: ICD-10-CM

## 2018-03-29 PROCEDURE — 99214 OFFICE O/P EST MOD 30 MIN: CPT | Performed by: ANESTHESIOLOGY

## 2018-03-29 RX ORDER — BUPRENORPHINE 10 UG/H
1 PATCH TRANSDERMAL
Qty: 4 PATCH | Refills: 2 | Status: SHIPPED | OUTPATIENT
Start: 2018-04-25 | End: 2018-06-21 | Stop reason: SDUPTHER

## 2018-03-29 RX ORDER — BACLOFEN 10 MG/1
10 TABLET ORAL 3 TIMES DAILY PRN
Qty: 90 TABLET | Refills: 2 | Status: SHIPPED | OUTPATIENT
Start: 2018-03-29 | End: 2018-06-21

## 2018-03-29 RX ORDER — PREGABALIN 75 MG/1
75 CAPSULE ORAL 2 TIMES DAILY
Qty: 60 CAPSULE | Refills: 2 | Status: SHIPPED | OUTPATIENT
Start: 2018-03-29 | End: 2018-06-21 | Stop reason: SDUPTHER

## 2018-03-29 NOTE — PROGRESS NOTES
Assessment:  1  Chronic pain syndrome - Bilateral    2  Paresthesias/numbness    3  Pain syndrome, chronic    4  Chronic right shoulder pain    5  Cervicalgia    6  Myofascial pain    7  Chronic left shoulder pain        Plan:  19-year-old female returning for follow-up of chronic bilateral shoulder pain s/p multiple shoulders surgeries with paresthesias and numbness into her upper extremities in no specific dermatomal fashion with the right arm affected more than the left  She did not have any compressive pathology in her cervical spine an EMG of her upper extremities were normal   She does have some chronic neck pain which seems to be largely myofascial in nature  She has been taking Lyrica 75 mg b i d  and does feel that this is more effective than the gabapentin, however which titrated up to t i d  dosing she was unable to tolerate the drowsiness  She also utilizes baclofen 10 mg q 8 hours p r n  and Butrans patch 10 micrograms/hour 1 patch transdermally weekly  She gets approximately 60% relief of her current pain medicine regimen  She denies any other side effects other than noted above  1   I advised the patient to try taking Lyrica 75 mg 2 capsules at bedtime to see if this improves her daytime drowsiness while hopefully helping to manage her neuropathic symptoms  2   The patient may continue with Butrans patch 10 micrograms/hour transdermally weekly  She was given a refill dated for do not fill before April 25, 2018 with 2 refills  3   The patient may continue with baclofen 10 mg q 8 hours p r n  for myofascial pain  4   Patient will continue with her home exercise program  5  I will follow up the patient in 3 months    There are risks associated with opioid medications, including dependence, addiction and tolerance  The patient understands and agrees to use these medications only as prescribed   Potential side effects of the medications include, but are not limited to, constipation, drowsiness, addiction, impaired judgment and risk of fatal overdose if not taken as prescribed  The patient was warned against driving while taking sedation medications  Sharing medications is a felony  At this point in time, the patient is showing no signs of addiction, abuse, diversion or suicidal ideation  A urine drug screen was collected at today's office visit as part of our medication management protocol  The point of care testing results were appropriate for what was being prescribed  The specimen will be sent for confirmatory testing  The drug screen is medically necessary because the patient is either dependent on opioid medication or is being considered for opioid medication therapy and the results could impact ongoing or future treatment  The drug screen is to evaluate for the presences or absence of prescribed, non-prescribed, and/or illicit drugs/substances  [place] Prescription Drug Monitoring Program report was reviewed and was appropriate       My impressions and treatment recommendations were discussed in detail with the patient who verbalized understanding and had no further questions  Discharge instructions were provided  I personally saw and examined the patient and I agree with the above discussed plan of care  No orders of the defined types were placed in this encounter  No orders of the defined types were placed in this encounter  History of Present Illness:    Chris Sullivan is a 39 y o  female 59-year-old female returning for follow-up of chronic bilateral shoulder pain s/p multiple shoulders surgeries with paresthesias and numbness into her upper extremities in no specific dermatomal fashion with the right arm affected more than the left  She did not have any compressive pathology in her cervical spine an EMG of her upper extremities were normal   She does have some chronic neck pain     She has been taking Lyrica 75 mg b i d  and does feel that this is more effective than the gabapentin, however which titrated up to t i d  dosing she was unable to tolerate the drowsiness  She also utilizes baclofen 10 mg q 8 hours p r n  and Butrans patch 10 micrograms/hour 1 patch transdermally weekly  She gets approximately 60% relief of her current pain medicine regimen  She denies any other side effects other than noted above  The patient rates her pain as 6/10 on the pain is worse in the evening at night  The pain is constant and described as burning, dull, aching, sharp, throbbing, cramping, pressure-like, numbness, pins and needles, and shooting  The pain is worse with overhead activities of her upper extremities, lifting, and bending her neck  The pain is alleviated with relaxation and lying down  I have personally reviewed and/or updated the patient's past medical history, past surgical history, family history, social history, current medications, allergies, and vital signs today  Other than as stated above, the patient denies any interval changes in medications, medical condition, mental condition, symptoms, or allergies since the last office visit  Review of Systems:    Review of Systems   Respiratory: Negative for shortness of breath  Cardiovascular: Negative for chest pain  Gastrointestinal: Negative for constipation, diarrhea, nausea and vomiting  Musculoskeletal: Negative for arthralgias, gait problem, joint swelling and myalgias  Decreased ROM, Joint stiffness    Skin: Negative for rash  Neurological: Negative for dizziness, seizures and weakness (Muscle )  All other systems reviewed and are negative        Patient Active Problem List   Diagnosis    Acid reflux disease    Aftercare following surgery of the musculoskeletal system    Allergic rhinitis    Analgesic use    Avulsion fracture of middle phalanx of finger, closed, initial encounter    Bilateral hand pain    Cervical radiculopathy    Chronic cervical pain    Chronic cough    Chronic myofascial pain    Closed displaced fracture of proximal phalanx of left ring finger    Cold intolerance    Contracture of finger joint, left    Luetscher's syndrome    Eczema    Encounter for long-term opiate analgesic use    Esophageal reflux    Fatigue    Finger fracture, left    Finger pain    Gastroenteritis    Generalized anxiety disorder    Hearing loss    Impacted cerumen of right ear    Irritable bowel syndrome    Non-toxic uninodular goiter    Opioid dependence (HCC)    Pain in right shoulder    Pain syndrome, chronic    Painful orthopaedic hardware ([address] )    Paresthesias/numbness    Excessive thirst    Stiffness of finger joint of left hand    Suprascapular neuropathy    Disorder of bursae and tendons in shoulder region    Thyroid nodule    Xerostomia due to mouth breathing    Neck pain - Bilateral    Chronic bilateral low back pain without sciatica    Chronic pain syndrome - Bilateral    Myofascial pain syndrome - Bilateral    Chronic diarrhea       Past Medical History:   Diagnosis Date    Anxiety     Arthritis     Right shoulder    Chronic pain disorder     Bilateral shoulders    Depression     Thyroid nodule     left side       Past Surgical History:   Procedure Laterality Date    APPENDECTOMY      DENTAL SURGERY      Dental implants    ORIF FINGER FRACTURE Left 6/7/2016    Procedure: RING FINGER PROXIMAL PHALANX FRACTURE O/R I/F ;  Surgeon: Rowan Moore MD;  [facility];  Service:     MD OPEN TX PHALANGEAL SHAFT FRACTURE PROX/MIDDLE EA Left 10/12/2016    Procedure: RING FINGER MIDDLE PHALANX FRACTURE OPEN REDUCTION W/ INTERNAL FIXATION, AND CONTRACTURE RELEASE OF PIP JOINT ;  Surgeon: Rowan Moore MD;  [facility];  Service: Orthopedics    MD REMOVAL DEEP IMPLANT Left 10/26/2017    Procedure: RING FINGER HARDWARE REMOVAL;  Surgeon: Jam Mtz MD;  [facility];  Service: Orthopedics    SHOULDER SURGERY      [facility]    WISDOM TOOTH EXTRACTION Bilateral        Family History   Problem Relation Age of Onset    Hypertension Mother     Asthma Mother     GI problems Mother     Stroke Mother     Hyperlipidemia Mother     Stroke Father     No Known Problems Brother     No Known Problems Brother     Stroke Other     Colon cancer Other     Diabetes Other     Breast cancer Other     Skin cancer Other        Social History     Occupational History    Not on file  Social History Main Topics    Smoking status: Former Smoker     Years: 5 00    Smokeless tobacco: Never Used      Comment: Quit 3 years ago      Alcohol use No    Drug use: No    Sexual activity: Not on file       Current Outpatient Prescriptions on File Prior to Visit   Medication Sig    al mag oxide-diphenhydramine-lidocaine viscous (MAGIC MOUTHWASH) 1:1:1 suspension SWISH AND SPIT EVERY 6 HOURS AS NEEDED    baclofen 10 mg tablet Take 1 tablet by mouth 3 (three) times a day as needed    Buprenorphine (BUTRANS) 10 MCG/HR PTWK Place 1 patch on the skin every 7 days    clonazePAM (KlonoPIN) 1 mg tablet Take 1 mg by mouth 4 (four) times a day as needed    esomeprazole (NexIUM) 40 MG capsule Take 1 capsule by mouth daily    famotidine (PEPCID) 20 mg tablet Take 1 tablet (20 mg total) by mouth daily at bedtime    fluticasone (FLONASE) 50 mcg/act nasal spray 2 sprays into each nostril daily at bedtime as needed      nicotine polacrilex (NICOTINE MINI) 4 MG lozenge Apply 4 mg to the mouth or throat as needed for smoking cessation    POLYETH GLYC-PROPYLENE GLYC NA Take 8 oz by mouth 2 (two) times a day as needed    pregabalin (LYRICA) 75 mg capsule Take 75 mg by mouth 4 (four) times a day    triamcinolone (KENALOG) 0 5 % cream Apply 30 application topically 3 (three) times a day    venlafaxine (EFFEXOR XR) 75 mg 24 hr capsule Take 1 capsule by mouth daily    zolpidem (AMBIEN) 10 mg tablet Take 10 mg by mouth daily at bedtime as needed for sleep    [DISCONTINUED] diphenhydrAMINE (BENADRYL) 12 5 mg/5 mL oral liquid Take by mouth daily at bedtime as needed for allergies    [DISCONTINUED] oxyCODONE-acetaminophen (PERCOCET) 5-325 mg per tablet TAKE 1 TABLET BY MOUTH EVERY 4-6 HOURS AS NEEDED FOR PAIN     No current facility-administered medications on file prior to visit  Allergies   Allergen Reactions    Naproxen GI Intolerance    Vancomycin Hives and Itching       Physical Exam:    /72   Pulse (!) 51   Temp 97 7 °F (36 5 °C)   Ht 5' 3" (1 6 m)   Wt 47 5 kg (104 lb 12 8 oz)   BMI 18 56 kg/m²     Constitutional: normal, well developed, well nourished, alert, in no distress and non-toxic and no overt pain behavior  Eyes: anicteric  HEENT: grossly intact  Neck: supple, symmetric, trachea midline and no masses   Pulmonary:even and unlabored  Cardiovascular:No edema or pitting edema present  Skin:Normal without rashes or lesions and well hydrated  Psychiatric:Mood and affect appropriate  Neurologic:Cranial Nerves II-XII grossly intact  Musculoskeletal:normal gait  Bilateral cervical paraspinals and trapezii tender to palpation and ropy in texture  Tenderness to palpation over the superior and anterior aspects of bilateral glenohumeral joints  Positive Neer's and empty can test bilaterally  Negative Spurling's bilaterally  Bilateral upper extremity strength 5/5 in all muscle groups      Imaging  Imaging reviewed

## 2018-04-11 DIAGNOSIS — J30.1 ALLERGIC RHINITIS DUE TO POLLEN, UNSPECIFIED SEASONALITY: Primary | ICD-10-CM

## 2018-04-11 RX ORDER — FLUTICASONE PROPIONATE 50 MCG
SPRAY, SUSPENSION (ML) NASAL
Qty: 3 BOTTLE | Refills: 3 | Status: SHIPPED | OUTPATIENT
Start: 2018-04-11 | End: 2018-10-05 | Stop reason: SDUPTHER

## 2018-05-04 ENCOUNTER — HOSPITAL ENCOUNTER (EMERGENCY)
Facility: HOSPITAL | Age: 43
Discharge: HOME | End: 2018-05-04
Attending: EMERGENCY MEDICINE
Payer: MEDICARE

## 2018-05-04 VITALS
SYSTOLIC BLOOD PRESSURE: 93 MMHG | DIASTOLIC BLOOD PRESSURE: 68 MMHG | HEART RATE: 50 BPM | WEIGHT: 105.6 LBS | TEMPERATURE: 98.5 F | HEIGHT: 63 IN | RESPIRATION RATE: 20 BRPM | BODY MASS INDEX: 18.71 KG/M2 | OXYGEN SATURATION: 100 %

## 2018-05-04 DIAGNOSIS — S61.412A LACERATION OF LEFT HAND WITHOUT FOREIGN BODY, INITIAL ENCOUNTER: Primary | ICD-10-CM

## 2018-05-04 PROCEDURE — 90715 TDAP VACCINE 7 YRS/> IM: CPT | Performed by: PHYSICIAN ASSISTANT

## 2018-05-04 PROCEDURE — 90471 IMMUNIZATION ADMIN: CPT

## 2018-05-04 PROCEDURE — 99282 EMERGENCY DEPT VISIT SF MDM: CPT

## 2018-05-04 RX ORDER — IBUPROFEN 400 MG/1
400 TABLET ORAL ONCE
Status: COMPLETED | OUTPATIENT
Start: 2018-05-04 | End: 2018-05-04

## 2018-05-04 RX ORDER — BACITRACIN, NEOMYCIN, POLYMYXIN B 400; 3.5; 5 [USP'U]/G; MG/G; [USP'U]/G
1 OINTMENT TOPICAL ONCE
Status: COMPLETED | OUTPATIENT
Start: 2018-05-04 | End: 2018-05-04

## 2018-05-04 RX ORDER — LIDOCAINE HYDROCHLORIDE 10 MG/ML
5 INJECTION, SOLUTION EPIDURAL; INFILTRATION; INTRACAUDAL; PERINEURAL ONCE
Status: COMPLETED | OUTPATIENT
Start: 2018-05-04 | End: 2018-05-04

## 2018-05-04 RX ADMIN — IBUPROFEN 400 MG: 400 TABLET ORAL at 18:14

## 2018-05-04 RX ADMIN — BACITRACIN, NEOMYCIN, POLYMYXIN B 1 SMALL APPLICATION: 400; 3.5; 5 OINTMENT TOPICAL at 18:41

## 2018-05-04 RX ADMIN — TETANUS TOXOID, REDUCED DIPHTHERIA TOXOID AND ACELLULAR PERTUSSIS VACCINE, ADSORBED 0.5 ML: 5; 2.5; 8; 8; 2.5 SUSPENSION INTRAMUSCULAR at 18:36

## 2018-05-04 RX ADMIN — LIDOCAINE HYDROCHLORIDE 5 ML: 10 INJECTION, SOLUTION EPIDURAL; INFILTRATION; INTRACAUDAL; PERINEURAL at 18:40

## 2018-05-04 NOTE — ED PROVIDER NOTES
History  Chief Complaint   Patient presents with    Hand Laceration     Pt reports dog pulled her with leash trying to gabriella another dog  Laceration to left hand  58-year-old female was no relevant medical history presents to emergency department for a left hand laceration  She was walking her dog when her dog became distracted by another loose dog, and pulled the leash, and the clasp of the lesion dug into the thenar eminence of her left hand  No dog bite was involved  She reports a significant amount of pain in this area  Unknown last tetanus  Prior to Admission Medications   Prescriptions Last Dose Informant Patient Reported? Taking?    Buprenorphine (BUTRANS) 10 MCG/HR PTWK   No No   Sig: Place 1 patch on the skin every 7 days   POLYETH GLYC-PROPYLENE GLYC NA  Self Yes No   Sig: Take 8 oz by mouth 2 (two) times a day as needed   al mag oxide-diphenhydramine-lidocaine viscous (MAGIC MOUTHWASH) 1:1:1 suspension   Yes No   Sig: SWISH AND SPIT EVERY 6 HOURS AS NEEDED   baclofen 10 mg tablet   No No   Sig: Take 1 tablet (10 mg total) by mouth 3 (three) times a day as needed for muscle spasms   clonazePAM (KlonoPIN) 1 mg tablet  Self Yes No   Sig: Take 1 mg by mouth 4 (four) times a day as needed   esomeprazole (NexIUM) 40 MG capsule  Self Yes No   Sig: Take 1 capsule by mouth daily   famotidine (PEPCID) 20 mg tablet   No No   Sig: Take 1 tablet (20 mg total) by mouth daily at bedtime   fluticasone (FLONASE) 50 mcg/act nasal spray   No No   Sig: INSTILL 2 SPRAYS INTO EACH NOSTRIL ONCE DAILY   nicotine polacrilex (NICOTINE MINI) 4 MG lozenge  Self Yes No   Sig: Apply 4 mg to the mouth or throat as needed for smoking cessation   pregabalin (LYRICA) 75 mg capsule   No No   Sig: Take 1 capsule (75 mg total) by mouth 2 (two) times a day   triamcinolone (KENALOG) 0 5 % cream  Self Yes No   Sig: Apply 30 application topically 3 (three) times a day   venlafaxine (EFFEXOR XR) 75 mg 24 hr capsule  Self Yes No   Sig: Take 1 capsule by mouth daily   zolpidem (AMBIEN) 10 mg tablet  Self Yes No   Sig: Take 10 mg by mouth daily at bedtime as needed for sleep      Facility-Administered Medications: None       Past Medical History:   Diagnosis Date    Anxiety     Arthritis     Right shoulder    Chronic pain disorder     Bilateral shoulders    Depression     Thyroid nodule     left side       Past Surgical History:   Procedure Laterality Date    APPENDECTOMY      DENTAL SURGERY      Dental implants    ORIF FINGER FRACTURE Left 6/7/2016    Procedure: RING FINGER PROXIMAL PHALANX FRACTURE O/R I/F ;  Surgeon: Carol Clinton MD;  [facility];  Service:     WA OPEN TX PHALANGEAL SHAFT FRACTURE PROX/MIDDLE EA Left 10/12/2016    Procedure: RING FINGER MIDDLE PHALANX FRACTURE OPEN REDUCTION W/ INTERNAL FIXATION, AND CONTRACTURE RELEASE OF PIP JOINT ;  Surgeon: Carol Clinton MD;  [facility];  Service: Orthopedics    [place] REMOVAL DEEP IMPLANT Left 10/26/2017    Procedure: [address] FINGER HARDWARE REMOVAL;  Surgeon: Glendale Apley, MD;  [facility];  Service: Orthopedics    SHOULDER SURGERY      bialt    WISDOM TOOTH EXTRACTION Bilateral        Family History   Problem Relation Age of Onset    Hypertension Mother     Asthma Mother     GI problems Mother     Stroke Mother     Hyperlipidemia Mother     Stroke Father     No Known Problems Brother     No Known Problems Brother     Stroke Other     Colon cancer Other     Diabetes Other     Breast cancer Other     Skin cancer Other      I have reviewed and agree with the history as documented  Social History   Substance Use Topics    Smoking status: Former Smoker     Years: 5 00    Smokeless tobacco: Never Used      Comment: Quit 3 years ago   Alcohol use No        Review of Systems   Constitutional: Negative for chills and fatigue  HENT: Negative for congestion      Respiratory: Negative for chest tightness and shortness of breath  Cardiovascular: Negative for chest pain  Blood pressure and heart rate typically run low, patient runs on a regular basis   Genitourinary: Negative for dysuria  Skin: Positive for wound  Neurological: Negative for dizziness and headaches  All other systems reviewed and are negative  Physical Exam  ED Triage Vitals [05/04/18 1720]   Temperature Pulse Respirations Blood Pressure SpO2   98 5 °F (36 9 °C) (!) 50 20 93/68 100 %      Temp Source Heart Rate Source Patient Position - Orthostatic VS BP Location FiO2 (%)   Oral Monitor Lying Right arm --      Pain Score       8           Orthostatic Vital Signs  Vitals:    05/04/18 1720   BP: 93/68   Pulse: (!) 50   Patient Position - Orthostatic VS: Lying       Physical Exam   Constitutional: She appears well-developed and well-nourished  No distress  HENT:   Head: Normocephalic and atraumatic  Eyes: Pupils are equal, round, and reactive to light  Neck: Normal range of motion  Cardiovascular: Normal rate, regular rhythm and normal heart sounds  Exam reveals no gallop and no friction rub  No murmur heard  Pulmonary/Chest: Effort normal and breath sounds normal  No respiratory distress  She has no wheezes  She has no rales  Abdominal: Soft  Musculoskeletal: Normal range of motion  Range of motion of all fingers in flexion and extension is normal   Cap refill less than 2 seconds  Sensation to light touch is slightly decreased on the radial aspect of the left thumb, otherwise normal    Skin: She is not diaphoretic              ED Medications  Medications   ibuprofen (MOTRIN) tablet 400 mg (400 mg Oral Given 5/4/18 1814)   lidocaine (PF) (XYLOCAINE-MPF) 1 % injection 5 mL (5 mL Infiltration Given 5/4/18 1840)   neomycin-bacitracin-polymyxin b (NEOSPORIN) ointment 1 small application (1 small application Topical Given 5/4/18 1841)   tetanus-diphtheria-acellular pertussis (BOOSTRIX) IM injection 0 5 mL (0 5 mL Intramuscular Given 5/4/18 1836)       Diagnostic Studies  Results Reviewed     None                 No orders to display              Procedures  Lac Repair  Date/Time: 5/4/2018 7:46 PM  Performed by: Taz Cano  Authorized by: Mely Reyes   Consent: Verbal consent obtained  Risks and benefits: risks, benefits and alternatives were discussed  Consent given by: patient  Patient understanding: patient states understanding of the procedure being performed  Patient consent: the patient's understanding of the procedure matches consent given  Procedure consent: procedure consent matches procedure scheduled  Relevant documents: relevant documents present and verified  Test results: test results available and properly labeled  Required items: required blood products, implants, devices, and special equipment available  Patient identity confirmed: verbally with patient  Body area: upper extremity  Location details: left hand  Laceration length: 1 5 cm  Foreign bodies: no foreign bodies  Tendon involvement: none  Nerve involvement: none  Vascular damage: no  Anesthesia: local infiltration    Anesthesia:  Local Anesthetic: lidocaine 1% without epinephrine  Anesthetic total: 2 mL    Sedation:  Patient sedated: no    Wound Dehiscence:  Superficial Wound Dehiscence: simple closure      Procedure Details:  Preparation: Patient was prepped and draped in the usual sterile fashion    Irrigation solution: saline  Irrigation method: syringe  Amount of cleaning: extensive  Debridement: none  Degree of undermining: none  Skin closure: 4-0 nylon  Number of sutures: 3  Technique: simple  Approximation: close  Approximation difficulty: simple  Dressing: antibiotic ointment (Sterile bandage)  Patient tolerance: Patient tolerated the procedure well with no immediate complications             Phone Contacts  ED Phone Contact    ED Course                               MDM  Number of Diagnoses or Management Options  Laceration of left hand without foreign body, initial encounter:   Diagnosis management comments: 65-year-old female presents for a laceration of the left hand caused by the dog leash she was holding when her dog ran out in front of her  Approximately 1 5 cm laceration of the superior aspect of the left thenar eminence  Neurovascularly intact  Normal range of motion of all fingers of the left hand  Tetanus was updated  She was given 4 mg of ibuprofen  The laceration was closed with 4 0 nylon after local infiltration and approximately 150 cc of saline irrigation  She tolerated the procedure well  Advised to return in 10-14 days for suture removal   Neosporin and sterile Band-Aid were applied  She is advised to keep the wound dry till tomorrow  Keep the area clean until suture removal   Avoid excessive use of that hand until sutures are removed  She understood and agreed with the treatment plan and had no questions  CritCare Time    Disposition  Final diagnoses:   Laceration of left hand without foreign body, initial encounter     Time reflects when diagnosis was documented in both MDM as applicable and the Disposition within this note     Time User Action Codes Description Comment    5/4/2018  6:41 PM Lauro Rincon 127 Laceration of left hand without foreign body, initial encounter       ED Disposition     ED Disposition Condition Comment    Discharge  Karissa Sylvester discharge to home/self care      Condition at discharge: stable        Follow-up Information     Follow up With Specialties Details Why Contact Info Additional Information    [facility] Department Emergency Medicine In 2 weeks For suture removal [address] [facility]  [facility] ED,  [address] [place] [address], [address] [facility] Emergency Department Emergency Medicine  If symptoms worsen [address]  [phone] [facility], [place], [place], [address]        Discharge Medication List as of 5/4/2018  6:43 PM      CONTINUE these medications which have NOT CHANGED    Details   al mag oxide-diphenhydramine-lidocaine viscous (MAGIC MOUTHWASH) 1:1:1 suspension SWISH AND SPIT EVERY 6 HOURS AS NEEDED, Historical Med      baclofen 10 mg tablet Take 1 tablet (10 mg total) by mouth 3 (three) times a day as needed for muscle spasms, Starting Thu 3/29/2018, Normal      Buprenorphine (BUTRANS) 10 MCG/HR PTWK Place 1 patch on the skin every 7 days, Starting Wed 4/25/2018, Print      clonazePAM (KlonoPIN) 1 mg tablet Take 1 mg by mouth 4 (four) times a day as needed, Historical Med      esomeprazole (NexIUM) 40 MG capsule Take 1 capsule by mouth daily, Starting Tue 3/8/2016, Historical Med      famotidine (PEPCID) 20 mg tablet Take 1 tablet (20 mg total) by mouth daily at bedtime, Starting Thu 3/8/2018, Normal      fluticasone (FLONASE) 50 mcg/act nasal spray INSTILL 2 SPRAYS INTO EACH NOSTRIL ONCE DAILY, Normal      nicotine polacrilex (NICOTINE MINI) 4 MG lozenge Apply 4 mg to the mouth or throat as needed for smoking cessation, Historical Med      POLYETH GLYC-PROPYLENE GLYC NA Take 8 oz by mouth 2 (two) times a day as needed, Starting Tue 4/22/2014, Historical Med      pregabalin (LYRICA) 75 mg capsule Take 1 capsule (75 mg total) by mouth 2 (two) times a day, Starting Thu 3/29/2018, Print      triamcinolone (KENALOG) 0 5 % cream Apply 30 application topically 3 (three) times a day, Starting Tue 3/8/2016, Historical Med      venlafaxine (EFFEXOR XR) 75 mg 24 hr capsule Take 1 capsule by mouth daily, Historical Med      zolpidem (AMBIEN) 10 mg tablet Take 10 mg by mouth daily at bedtime as needed for sleep, Historical Med           No discharge procedures on file      ED Provider  Electronically Signed by           Gretchen Eller PA-C  05/04/18 1949

## 2018-05-04 NOTE — DISCHARGE INSTRUCTIONS
Laceration   WHAT YOU NEED TO KNOW:   A laceration is an injury to the skin and the soft tissue underneath it  Lacerations happen when you are cut or hit by something  They can happen anywhere on the body  DISCHARGE INSTRUCTIONS:   Seek care immediately if:   · You have heavy bleeding or bleeding that does not stop after 10 minutes of holding firm, direct pressure over the wound  · Your wound opens up  Contact your healthcare provider if:   · You have a fever or chills  · Your laceration is red, warm, or swollen  · You have red streaks on your skin coming from your wound  · You have white or yellow drainage from the wound that smells bad  · You have pain that gets worse, even after treatment  · You have questions or concerns about your condition or care  Medicines:   · Prescription pain medicine  may be given  Ask how to take this medicine safely  · Antibiotics  help treat or prevent a bacterial infection  · Take your medicine as directed  Contact your healthcare provider if you think your medicine is not helping or if you have side effects  Tell him or her if you are allergic to any medicine  Keep a list of the medicines, vitamins, and herbs you take  Include the amounts, and when and why you take them  Bring the list or the pill bottles to follow-up visits  Carry your medicine list with you in case of an emergency  Care for your wound as directed:   · Do not get your wound wet  until your healthcare provider says it is okay  Do not soak your wound in water  Do not go swimming until your healthcare provider says it is okay  Carefully wash the wound with soap and water  Gently pat the area dry or allow it to air dry  · Change your bandages  when they get wet, dirty, or after washing  Apply new, clean bandages as directed  Do not apply elastic bandages or tape too tight  Do not put powders or lotions over your incision  · Apply antibiotic ointment as directed    Your healthcare provider may give you antibiotic ointment to put over your wound if you have stitches  If you have strips of tape over your incision, let them dry up and fall off on their own  If they do not fall off within 14 days, gently remove them  If you have glue over your wound, do not remove or pick at it  If your glue comes off, do not replace it with glue that you have at home  · Check your wound every day for signs of infection such as swelling, redness, or pus  Self-care:   · Apply ice  on your wound for 15 to 20 minutes every hour or as directed  Use an ice pack, or put crushed ice in a plastic bag  Cover it with a towel  Ice helps prevent tissue damage and decreases swelling and pain  · Use a splint as directed  A splint will decrease movement and stress on your wound  It may help it heal faster  A splint may be used for lacerations over joints or areas of your body that bend  Ask your healthcare provider how to apply and remove a splint  · Decrease scarring of your wound  by applying ointments as directed  Do not apply ointments until your healthcare provider says it is okay  You may need to wait until your wound is healed  Ask which ointment to buy and how often to use it  After your wound is healed, use sunscreen over the area when you are out in the sun  You should do this for at least 6 months to 1 year after your injury  Follow up with your healthcare provider as directed: You will need to return in 3 to 14 days to have stitches or staples removed  Write down your questions so you remember to ask them during your visits  © 2017 [employer] Information is for End User's use only and may not be sold, redistributed or otherwise used for commercial purposes  All illustrations and images included in CareNotes® are the copyrighted property of [employer]  or [employer]  The above information is an  only   It is not intended as medical advice for individual conditions or treatments  Talk to your doctor, nurse or pharmacist before following any medical regimen to see if it is safe and effective for you

## 2018-05-04 NOTE — ED ATTENDING ATTESTATION
Vanessa Boas, MD, saw and evaluated the patient  I have discussed the patient with the resident/non-physician practitioner and agree with the resident's/non-physician practitioner's findings, Plan of Care, and MDM as documented in the resident's/non-physician practitioner's note, except where noted  All available labs and Radiology studies were reviewed  At this point I agree with the current assessment done in the Emergency Department  I have conducted an independent evaluation of this patient a history and physical is as follows:    39-year-old female presented with 1 cm laceration to the left 1st webspace after getting pulled by her yellow lab and having the leash cut her  No tendon involvement  Last tetanus unknown  Will update  Will irrigated suture for closure      Critical Care Time  CritCare Time    Procedures

## 2018-05-09 ENCOUNTER — HOSPITAL ENCOUNTER (OUTPATIENT)
Dept: RADIOLOGY | Age: 43
Discharge: HOME | End: 2018-05-09
Payer: MEDICARE

## 2018-05-09 DIAGNOSIS — Z12.31 ENCOUNTER FOR SCREENING MAMMOGRAM FOR MALIGNANT NEOPLASM OF BREAST: ICD-10-CM

## 2018-05-09 PROCEDURE — 77067 SCR MAMMO BI INCL CAD: CPT

## 2018-05-11 ENCOUNTER — TELEPHONE (OUTPATIENT)
Dept: FAMILY MEDICINE CLINIC | Facility: CLINIC | Age: 43
End: 2018-05-11

## 2018-05-11 DIAGNOSIS — R10.13 EPIGASTRIC PAIN: Primary | ICD-10-CM

## 2018-05-11 NOTE — TELEPHONE ENCOUNTER
Spoke with pt  She states that Dr Mena Hoffmann ordered the test  She did call [facility] and was told they don't know where the pt can have the test done  However, pt can have BW to test for lactose intolerance at [facility]    Please order lactose intolerance BW and mail slip to pt

## 2018-05-11 NOTE — TELEPHONE ENCOUNTER
This test is usually ordered by gastroenterologists and I am not quite sure how and where  it is done  I am pretty sure, this test probably was ordered by GI physician, so patient should check with him or her?   Patient also can contact [facility] lab  Thanks

## 2018-05-11 NOTE — TELEPHONE ENCOUNTER
Patient called asking where she would call to schedule a Hydrogen Breat Test   Please advise patient at [phone]

## 2018-05-11 NOTE — TELEPHONE ENCOUNTER
RE: The Hydrogen Breath test     I have been trying to find a place to do this at & no one does  They say they can do the Lactose blood test so can I get a script to have this done instead? Please call to advise

## 2018-05-11 NOTE — TELEPHONE ENCOUNTER
I will print out for an H  Pylori stool test   However, she will need to check with lab to see what type of container she needs to bring sample to the lab

## 2018-05-11 NOTE — TELEPHONE ENCOUNTER
Script written for stool specimen  It must be in a sterile container and kept frozen! Unable to leave message for pt

## 2018-05-14 ENCOUNTER — HOSPITAL ENCOUNTER (EMERGENCY)
Facility: HOSPITAL | Age: 43
Discharge: HOME | End: 2018-05-14
Attending: EMERGENCY MEDICINE | Admitting: EMERGENCY MEDICINE
Payer: MEDICARE

## 2018-05-14 ENCOUNTER — OFFICE VISIT (OUTPATIENT)
Dept: OBGYN CLINIC | Facility: CLINIC | Age: 43
End: 2018-05-14
Payer: MEDICARE

## 2018-05-14 VITALS
HEIGHT: 63 IN | SYSTOLIC BLOOD PRESSURE: 100 MMHG | WEIGHT: 103.4 LBS | DIASTOLIC BLOOD PRESSURE: 60 MMHG | BODY MASS INDEX: 18.32 KG/M2

## 2018-05-14 VITALS
HEART RATE: 50 BPM | TEMPERATURE: 98 F | OXYGEN SATURATION: 100 % | RESPIRATION RATE: 18 BRPM | DIASTOLIC BLOOD PRESSURE: 74 MMHG | BODY MASS INDEX: 18.32 KG/M2 | WEIGHT: 103.4 LBS | SYSTOLIC BLOOD PRESSURE: 120 MMHG

## 2018-05-14 DIAGNOSIS — L03.114 CELLULITIS OF LEFT HAND: ICD-10-CM

## 2018-05-14 DIAGNOSIS — N93.9 ABNORMAL UTERINE BLEEDING: Primary | ICD-10-CM

## 2018-05-14 DIAGNOSIS — N84.0 ENDOMETRIAL POLYP: ICD-10-CM

## 2018-05-14 DIAGNOSIS — Z48.02 VISIT FOR SUTURE REMOVAL: Primary | ICD-10-CM

## 2018-05-14 DIAGNOSIS — N88.2 CERVICAL STENOSIS (UTERINE CERVIX): ICD-10-CM

## 2018-05-14 PROCEDURE — 99281 EMR DPT VST MAYX REQ PHY/QHP: CPT

## 2018-05-14 PROCEDURE — 88305 TISSUE EXAM BY PATHOLOGIST: CPT | Performed by: PATHOLOGY

## 2018-05-14 PROCEDURE — 99203 OFFICE O/P NEW LOW 30 MIN: CPT | Performed by: OBSTETRICS & GYNECOLOGY

## 2018-05-14 RX ORDER — CEPHALEXIN 500 MG/1
500 CAPSULE ORAL EVERY 6 HOURS SCHEDULED
Qty: 40 CAPSULE | Refills: 0 | Status: SHIPPED | OUTPATIENT
Start: 2018-05-14 | End: 2018-05-24

## 2018-05-14 RX ORDER — MISOPROSTOL 200 UG/1
TABLET ORAL
Qty: 2 TABLET | Refills: 0 | Status: SHIPPED | OUTPATIENT
Start: 2018-05-14 | End: 2018-07-16 | Stop reason: ALTCHOICE

## 2018-05-14 NOTE — ED PROVIDER NOTES
History  Chief Complaint   Patient presents with    Suture / Staple Removal     pt here about 10 days prior placed stitches in left hand/palm now here to have them removed  78-year-old female with no significant past medical history, who presents to the emergency department for suture removal   Patient states that did sutures were placed to her right base of the thumb 10 days ago after a dog metal part of the leash caught her hand  She does endorse some serous malodorous drainage and surrounding erythema to the suture si te  Otherwise denies any fevers or chills  No other complaints at this time  History provided by:  Patient   used: No        Prior to Admission Medications   Prescriptions Last Dose Informant Patient Reported? Taking?    Buprenorphine (BUTRANS) 10 MCG/HR PTWK   No No   Sig: Place 1 patch on the skin every 7 days   POLYETH GLYC-PROPYLENE GLYC NA  Self Yes No   Sig: Take 8 oz by mouth 2 (two) times a day as needed   al mag oxide-diphenhydramine-lidocaine viscous (MAGIC MOUTHWASH) 1:1:1 suspension   Yes No   Sig: SWISH AND SPIT EVERY 6 HOURS AS NEEDED   baclofen 10 mg tablet   No No   Sig: Take 1 tablet (10 mg total) by mouth 3 (three) times a day as needed for muscle spasms   clonazePAM (KlonoPIN) 1 mg tablet  Self Yes No   Sig: Take 1 mg by mouth 4 (four) times a day as needed   esomeprazole (NexIUM) 40 MG capsule  Self Yes No   Sig: Take 1 capsule by mouth daily   famotidine (PEPCID) 20 mg tablet   No No   Sig: Take 1 tablet (20 mg total) by mouth daily at bedtime   fluticasone (FLONASE) 50 mcg/act nasal spray   No No   Sig: INSTILL 2 SPRAYS INTO EACH NOSTRIL ONCE DAILY   misoprostol (CYTOTEC) 200 mcg tablet   No No   Sig: Place one pill in vagina the night before your appointment, and take one orally the morning of your appointment   nicotine polacrilex (NICOTINE MINI) 4 MG lozenge  Self Yes No   Sig: Apply 4 mg to the mouth or throat as needed for smoking cessation   pregabalin (LYRICA) 75 mg capsule   No No   Sig: Take 1 capsule (75 mg total) by mouth 2 (two) times a day   triamcinolone (KENALOG) 0 5 % cream  Self Yes No   Sig: Apply 30 application topically 3 (three) times a day   venlafaxine (EFFEXOR XR) 75 mg 24 hr capsule  Self Yes No   Sig: Take 1 capsule by mouth daily   zolpidem (AMBIEN) 10 mg tablet  Self Yes No   Sig: Take 10 mg by mouth daily at bedtime as needed for sleep      Facility-Administered Medications: None       Past Medical History:   Diagnosis Date    Anxiety     Arthritis     Right shoulder    Chronic pain disorder     Bilateral shoulders    Depression     Thyroid nodule     left side       Past Surgical History:   Procedure Laterality Date    APPENDECTOMY      DENTAL SURGERY      Dental implants    ORIF FINGER FRACTURE Left 6/7/2016    Procedure: RING FINGER PROXIMAL PHALANX FRACTURE O/R I/F ;  Surgeon: Claudeen Ewing, MD;  [facility];  Service:     AL OPEN TX PHALANGEAL SHAFT FRACTURE PROX/MIDDLE EA Left 10/12/2016    Procedure: RING FINGER MIDDLE PHALANX FRACTURE OPEN REDUCTION W/ INTERNAL FIXATION, AND CONTRACTURE RELEASE OF PIP JOINT ;  Surgeon: Claudeen Ewing, MD;  [facility];  Service: Orthopedics    AL REMOVAL DEEP IMPLANT Left 10/26/2017    Procedure: RING FINGER HARDWARE REMOVAL;  Surgeon: Jose Whitaker MD;  Location: [facility];  Service: Orthopedics    SHOULDER SURGERY      bialt    WISDOM TOOTH EXTRACTION Bilateral        Family History   Problem Relation Age of Onset    Hypertension Mother     Asthma Mother     GI problems Mother     Stroke Mother     Hyperlipidemia Mother     Stroke Father     No Known Problems Brother     No Known Problems Brother     Breast cancer Maternal Aunt     Uterine cancer Maternal Aunt     Colon cancer Paternal Grandmother      I have reviewed and agree with the history as documented      Social History   Substance Use Topics    Smoking status: Former Smoker     Years: 5 00    Smokeless tobacco: Never Used      Comment: Quit 3 years ago   Alcohol use No        Review of Systems   Constitutional: Negative for chills and fever  HENT: Negative  Eyes: Negative  Respiratory: Negative for cough, chest tightness, shortness of breath and wheezing  Cardiovascular: Negative for chest pain, palpitations and leg swelling  Gastrointestinal: Negative for abdominal pain, constipation, diarrhea, nausea and vomiting  Genitourinary: Negative for dysuria, flank pain, frequency, hematuria and urgency  Musculoskeletal: Negative for arthralgias, back pain, gait problem, joint swelling, myalgias, neck pain and neck stiffness  Skin: Positive for wound  Negative for color change, pallor and rash  Neurological: Negative for dizziness, syncope, weakness, light-headedness, numbness and headaches  Psychiatric/Behavioral: Negative  Physical Exam  ED Triage Vitals [05/14/18 1002]   Temperature Pulse Respirations Blood Pressure SpO2   98 °F (36 7 °C) (!) 50 18 120/74 100 %      Temp Source Heart Rate Source Patient Position - Orthostatic VS BP Location FiO2 (%)   Oral Monitor Sitting Right arm --      Pain Score       5           Orthostatic Vital Signs  Vitals:    05/14/18 1002   BP: 120/74   Pulse: (!) 50   Patient Position - Orthostatic VS: Sitting       Physical Exam   Constitutional: She is oriented to person, place, and time  She appears well-developed and well-nourished  HENT:   Head: Normocephalic and atraumatic  Eyes: Conjunctivae and EOM are normal  Pupils are equal, round, and reactive to light  Neck: Normal range of motion  Neck supple  Cardiovascular: Normal rate, regular rhythm and intact distal pulses  Pulmonary/Chest: Effort normal and breath sounds normal  She has no wheezes  She has no rales  Musculoskeletal: Normal range of motion  She exhibits no edema or tenderness     Neurological: She is alert and oriented to person, place, and time  No cranial nerve deficit or sensory deficit  She exhibits normal muscle tone  Coordination normal    Skin: Skin is warm and dry  Capillary refill takes less than 2 seconds  There is erythema  3 sutures intact to the base of the left thumb, with mild surrounding erythema and tender to palpation  Some serosanguineous drainage expressed  No fluctuance  Psychiatric: She has a normal mood and affect  Her behavior is normal    Nursing note and vitals reviewed  ED Medications  Medications - No data to display    Diagnostic Studies  Results Reviewed     None                 No orders to display              Procedures  Suture Removal  Date/Time: 5/14/2018 10:58 AM  Performed by: Sandrine Younger  Authorized by: Yady Burks     Patient location:  ED  Consent:     Consent obtained:  Verbal    Consent given by:  Patient    Risks discussed:  Bleeding, pain and wound separation    Alternatives discussed:  No treatment, delayed treatment and alternative treatment  Location:     Laterality:  Left    Location:  Upper extremity    Upper extremity location:  Hand    Hand location:  L hand  Procedure details: Tools used:  Suture removal kit    Wound appearance:  Draining, red, warm and tender    Number of sutures removed:  3  Post-procedure details:     Post-removal:  Band-Aid applied    Patient tolerance of procedure: Tolerated well, no immediate complications           Phone Contacts  ED Phone Contact    ED Course                               MDM  Number of Diagnoses or Management Options  Cellulitis of left hand:   Visit for suture removal:   Diagnosis management comments: 68-year-old female with no significant past medical history, who presents to the emergency department for suture removal    Differential Diagnosis includes but is not limited to:  Suture removal, cellulitis, abscess  Three sutures were removed from the hand successfully  Concern for cellulitis    Vital signs are stable in the emergency department  Patient given prescription for Keflex and instructed to follow up with primary care doctor for re-evaluation 3 days  Close return precautions given to the patient  CritCare Time    Disposition  Final diagnoses:   Visit for suture removal   Cellulitis of left hand     Time reflects when diagnosis was documented in both MDM as applicable and the Disposition within this note     Time User Action Codes Description Comment    5/14/2018 10:56 AM Frank Claudio Add [Z48 02] Visit for suture removal     5/14/2018 10:56 AM Aileen Hurst Add [X07 802] Cellulitis of left hand       ED Disposition     ED Disposition Condition Comment    Discharge  Karissa Sylvester discharge to home/self care      Condition at discharge: Good        Follow-up Information     Follow up With Specialties Details Why Contact Info    Keira Hendrickson MD Family Medicine In 3 days For wound re-check [address] [facility]  [phone]          Discharge Medication List as of 5/14/2018 10:57 AM      START taking these medications    Details   cephalexin (KEFLEX) 500 mg capsule Take 1 capsule (500 mg total) by mouth every 6 (six) hours for 10 days, Starting Mon 5/14/2018, Until Thu 5/24/2018, Print         CONTINUE these medications which have NOT CHANGED    Details   al mag oxide-diphenhydramine-lidocaine viscous (MAGIC MOUTHWASH) 1:1:1 suspension SWISH AND SPIT EVERY 6 HOURS AS NEEDED, Historical Med      baclofen 10 mg tablet Take 1 tablet (10 mg total) by mouth 3 (three) times a day as needed for muscle spasms, Starting Thu 3/29/2018, Normal      Buprenorphine (BUTRANS) 10 MCG/HR PTWK Place 1 patch on the skin every 7 days, Starting Wed 4/25/2018, Print      clonazePAM (KlonoPIN) 1 mg tablet Take 1 mg by mouth 4 (four) times a day as needed, Historical Med      esomeprazole (NexIUM) 40 MG capsule Take 1 capsule by mouth daily, Starting Tue 3/8/2016, Historical Med      famotidine (PEPCID) 20 mg tablet Take 1 tablet (20 mg total) by mouth daily at bedtime, Starting Thu 3/8/2018, Normal      fluticasone (FLONASE) 50 mcg/act nasal spray INSTILL 2 SPRAYS INTO EACH NOSTRIL ONCE DAILY, Normal      misoprostol (CYTOTEC) 200 mcg tablet Place one pill in vagina the night before your appointment, and take one orally the morning of your appointment, Normal      nicotine polacrilex (NICOTINE MINI) 4 MG lozenge Apply 4 mg to the mouth or throat as needed for smoking cessation, Historical Med      POLYETH GLYC-PROPYLENE GLYC NA Take 8 oz by mouth 2 (two) times a day as needed, Starting Tue 4/22/2014, Historical Med      pregabalin (LYRICA) 75 mg capsule Take 1 capsule (75 mg total) by mouth 2 (two) times a day, Starting Thu 3/29/2018, Print      triamcinolone (KENALOG) 0 5 % cream Apply 30 application topically 3 (three) times a day, Starting Tue 3/8/2016, Historical Med      venlafaxine (EFFEXOR XR) 75 mg 24 hr capsule Take 1 capsule by mouth daily, Historical Med      zolpidem (AMBIEN) 10 mg tablet Take 10 mg by mouth daily at bedtime as needed for sleep, Historical Med           No discharge procedures on file      ED Provider  Electronically Signed by           Guzman Chan PA-C  05/14/18 6104

## 2018-05-14 NOTE — PROGRESS NOTES
Assessment/Plan:  Problem List Items Addressed This Visit     None      Visit Diagnoses     Abnormal uterine bleeding    -  Primary    Relevant Orders    CBC    US pelvis complete w transvaginal    Cervical stenosis (uterine cervix)        Relevant Medications    misoprostol (CYTOTEC) 200 mcg tablet    Endometrial polyp        Relevant Orders    Tissue Exam        Will return for endometrial biopsy  Will have labs performed prior to this  Recently had a TSH performed in January  Subjective     Jayden Lara is a 39 y o  female never sexually active with LMP 5/8/18 present for irregular bleeding  Patient has not seen a gynecologist for over 25 years  She does not know if she has ever had a pap smear before  Patient was 14years old when she first started having her menses  Her periods were regular until 2 years ago, and her mother went through menopause in her early 42's  Patient has hot flashes for the past year  Over the past two years, periods come every 12-28 days  Patient states that she passes blood clots during her period  Recent TSH in January was normal   Period cramps are getting worse during her menses  Patient takes ibuprofen 800mg and it is not resolving her pain  Patient has also been noticing that she is getting more frequent UTI's with her periods        Patient Active Problem List   Diagnosis    Acid reflux disease    Aftercare following surgery of the musculoskeletal system    Allergic rhinitis    Analgesic use    Avulsion fracture of middle phalanx of finger, closed, initial encounter    Bilateral hand pain    Cervical radiculopathy    Chronic cervical pain    Chronic cough    Chronic myofascial pain    Closed displaced fracture of proximal phalanx of left ring finger    Cold intolerance    Contracture of finger joint, left    Luetscher's syndrome    Eczema    Encounter for long-term opiate analgesic use    Esophageal reflux    Fatigue    Finger fracture, left    Finger pain    Gastroenteritis    Generalized anxiety disorder    Hearing loss    Impacted cerumen of right ear    Irritable bowel syndrome    Non-toxic uninodular goiter    Opioid dependence (HCC)    Pain in right shoulder    Pain syndrome, chronic    Painful orthopaedic hardware (HCC)    Paresthesias/numbness    Excessive thirst    Stiffness of finger joint of left hand    Suprascapular neuropathy    Disorder of bursae and tendons in shoulder region    Thyroid nodule    Xerostomia due to mouth breathing    Neck pain - Bilateral    Chronic bilateral low back pain without sciatica    Chronic pain syndrome - Bilateral    Myofascial pain syndrome - Bilateral    Chronic diarrhea    Cervicalgia    Myofascial pain    Chronic left shoulder pain       Gynecologic History  Patient's last menstrual period was 05/08/2018  Contraception: none    The following portions of the patient's history were reviewed and updated as appropriate: allergies, current medications, past family history, past medical history, past social history, past surgical history and problem list     Review of Systems  Pertinent items are noted in HPI  Objective    /60   Ht 5' 3" (1 6 m)   Wt 46 9 kg (103 lb 6 4 oz)   LMP 05/08/2018   BMI 18 32 kg/m²    GEN: The patient was alert and oriented x3, pleasant well-appearing female in no acute distress  PELVIC:  Normal appearing external female genitalia, normal vaginal epithelium, No discharge  Cervix present  Cervical polyp present  Grasped with ringed forcep and removed after betadyne was used to cleanse   Bimanual: absent CMT,  normal uterus, non-tender  No palpable adnexal masses        January 1/17/18:  TSH 1 36(normal)

## 2018-05-14 NOTE — DISCHARGE INSTRUCTIONS
Apply neosporin 2x per day to the wound  Take keflex for infection  Return for fevers, chills, spreading redness or any other concerns  Cellulitis, Ambulatory Care   GENERAL INFORMATION:   Cellulitis  is a skin infection caused by bacteria  Common symptoms include the following:   · Fever    · A red, warm, swollen area on your skin    · Pain when the area is touched    · Bumps or blisters (abscess) that may drain pus    · Bumpy, raised skin that feels like an orange peel  Seek immediate care for the following symptoms:   · An increase in pain, redness, warmth, and size    · Red streaks coming from the infected area    · A thin, gray-brown discharge coming from your infected skin area    · A crackling under your skin when you touch it    · Purple dots or bumps on your skin, or bleeding under your skin    · New swelling and pain in your legs    · Sudden trouble breathing or chest pain  Treatment for cellulitis  may include medicines to treat the bacterial infection or decrease pain  The infection may need to be cleaned out  Damaged, dead, or infected tissue may need to be cut away to help your wound heal   Manage your symptoms:   · Elevate your wound above the level of your heart  as often as you can  This will help decrease swelling and pain  Prop your wound on pillows or blankets to keep it elevated comfortably  · Clean your wound as directed  You may need to wash the wound with soap and water  Look for signs of infection  · Wear pressure stockings as directed  The stockings are tight and put pressure on your legs  This improves blood flow and decreases swelling  Prevent cellulitis:   · Wash your hands often  Use soap and water  Wash your hands after you use the bathroom, change a child's diapers, or sneeze  Wash your hands before you prepare or eat food  Use lotion to prevent dry, cracked skin  · Do not share personal items, such as towels, clothing, and razors       · Clean exercise equipment  with germ-killing  before and after you use it  Follow up with your healthcare provider as directed:  Write down your questions so you remember to ask them during your visits  CARE AGREEMENT:   You have the right to help plan your care  Learn about your health condition and how it may be treated  Discuss treatment options with your caregivers to decide what care you want to receive  You always have the right to refuse treatment  The above information is an  only  It is not intended as medical advice for individual conditions or treatments  Talk to your doctor, nurse or pharmacist before following any medical regimen to see if it is safe and effective for you  © 2014 [address] is for End User's use only and may not be sold, redistributed or otherwise used for commercial purposes  All illustrations and images included in CareNotes® are the copyrighted property of A D [employer]  or [employer]  Stitches Removal   AMBULATORY CARE:   Stitches  may need to be removed in 3 to 14 days depending on the location of your wound  Your healthcare provider will use sterile forceps or tweezers to  the knot of each stitch  He will cut the stitch with scissors and pull the stitch out  You may feel a slight tug as the stitch comes out  He may place small steristrips across your wound after the stitches have been removed  These pieces of tape will peel and fall of on their own  Do not pull them off  Seek care immediately if:   · Your wound splits open  · You suddenly cannot move your injured joint  · You have sudden numbness around your wound  · You see red streaks coming from your wound  Contact your healthcare provider if:   · You have a fever and chills  · Your wound is red, warm, swollen, or leaking pus  · There is a bad smell coming from your wound  · You have increased pain in the wound area      · You have questions or concerns about your condition or care  Care for your wound:   · Clean your wound as directed  Carefully wash your wound with soap and water  Pat the area dry with a clean towel  · Protect your wound  Your wound can swell, bleed, or split open if it is stretched or bumped  You may need to wear a bandage that supports your wound until it is completely healed  · Minimize your scar  Use sunblock if your wound is exposed to the sun  Apply it every day after the stitches are removed  This will help prevent skin discoloration  Follow up with your healthcare provider as directed: You may need to return in 3 to 5 days if the stitches are on your face  Stitches on your scalp need to be removed after 7 to 14 days  Stitches over joints may remain in place up to 14 days  Write down your questions so you remember to ask them during your visits  © 2017 [address] Information is for End User's use only and may not be sold, redistributed or otherwise used for commercial purposes  All illustrations and images included in CareNotes® are the copyrighted property of [employer]  or Yang Hein  The above information is an  only  It is not intended as medical advice for individual conditions or treatments  Talk to your doctor, nurse or pharmacist before following any medical regimen to see if it is safe and effective for you

## 2018-05-30 ENCOUNTER — APPOINTMENT (OUTPATIENT)
Dept: LAB | Facility: HOSPITAL | Age: 43
End: 2018-05-30
Payer: MEDICARE

## 2018-05-30 ENCOUNTER — TRANSCRIBE ORDERS (OUTPATIENT)
Dept: LAB | Facility: HOSPITAL | Age: 43
End: 2018-05-30

## 2018-05-30 DIAGNOSIS — R10.13 EPIGASTRIC PAIN: ICD-10-CM

## 2018-05-30 PROCEDURE — 87338 HPYLORI STOOL AG IA: CPT

## 2018-06-01 LAB — H PYLORI AG STL QL IA: NEGATIVE

## 2018-06-04 ENCOUNTER — TELEPHONE (OUTPATIENT)
Dept: FAMILY MEDICINE CLINIC | Facility: CLINIC | Age: 43
End: 2018-06-04

## 2018-06-04 NOTE — TELEPHONE ENCOUNTER
Please contact patient  Negative test for H pylori indicates that she does not have bacteria in her stomach cold H pylori that could be contributing to increased acidity and increased risk of gastric ulcers  This is NOT  the test to check for lactose intolerance    Thank you

## 2018-06-04 NOTE — TELEPHONE ENCOUNTER
----- Message from Verito Mckinney MD sent at 1/2/1809  7:10 AM EDT -----  H  Pylori test was negative   She may need to f/u with her GI for any recurring sx's

## 2018-06-04 NOTE — TELEPHONE ENCOUNTER
----- Message from Kaelyn Bautista sent at 6/4/2018  7:23 AM EDT -----  Regarding: FW: Test Results Question  Contact: [phone]      ----- Message -----  From: Germain Sylvester  Sent: 6/3/2018  10:23 PM  To: [facility]  Subject: Test Results Question                            I have a question about HELICOBACTER PYLORI ANTIGEN resulted on 6/1/18 at 8:09 PM   Does this mean I  am not lactose intolerant?   I was under the impression that's what this test was checking for  Thank you so much

## 2018-06-04 NOTE — TELEPHONE ENCOUNTER
Patient states that Dr Natasha Trotter gave her a script for a hydrogen breath test to test for lactose intolerance but the lab is no longer running this test  They informed her that she would need to have a blood test performed  Please advise

## 2018-06-18 DIAGNOSIS — K21.9 GASTROESOPHAGEAL REFLUX DISEASE WITHOUT ESOPHAGITIS: Primary | ICD-10-CM

## 2018-06-18 RX ORDER — ESOMEPRAZOLE MAGNESIUM 40 MG/1
CAPSULE, DELAYED RELEASE ORAL
Qty: 30 CAPSULE | Refills: 5 | Status: SHIPPED | OUTPATIENT
Start: 2018-06-18 | End: 2018-06-21

## 2018-06-21 ENCOUNTER — CLINICAL SUPPORT (OUTPATIENT)
Dept: PAIN MEDICINE | Facility: CLINIC | Age: 43
End: 2018-06-21
Payer: MEDICARE

## 2018-06-21 ENCOUNTER — TELEPHONE (OUTPATIENT)
Dept: PAIN MEDICINE | Facility: CLINIC | Age: 43
End: 2018-06-21

## 2018-06-21 VITALS
TEMPERATURE: 97.6 F | BODY MASS INDEX: 17.89 KG/M2 | HEART RATE: 53 BPM | WEIGHT: 101 LBS | DIASTOLIC BLOOD PRESSURE: 70 MMHG | SYSTOLIC BLOOD PRESSURE: 96 MMHG

## 2018-06-21 DIAGNOSIS — G89.29 CHRONIC MYOFASCIAL PAIN: ICD-10-CM

## 2018-06-21 DIAGNOSIS — M79.18 MYOFASCIAL PAIN: Primary | ICD-10-CM

## 2018-06-21 DIAGNOSIS — G89.29 CHRONIC LEFT SHOULDER PAIN: ICD-10-CM

## 2018-06-21 DIAGNOSIS — M25.512 CHRONIC LEFT SHOULDER PAIN: ICD-10-CM

## 2018-06-21 DIAGNOSIS — G89.4 PAIN SYNDROME, CHRONIC: ICD-10-CM

## 2018-06-21 DIAGNOSIS — M54.2 CERVICALGIA: ICD-10-CM

## 2018-06-21 DIAGNOSIS — M79.18 CHRONIC MYOFASCIAL PAIN: ICD-10-CM

## 2018-06-21 DIAGNOSIS — IMO0001 PARESTHESIAS/NUMBNESS: ICD-10-CM

## 2018-06-21 PROCEDURE — 99214 OFFICE O/P EST MOD 30 MIN: CPT | Performed by: ANESTHESIOLOGY

## 2018-06-21 RX ORDER — METHOCARBAMOL 750 MG/1
750 TABLET, FILM COATED ORAL 3 TIMES DAILY PRN
Qty: 90 TABLET | Refills: 2 | Status: SHIPPED | OUTPATIENT
Start: 2018-06-21 | End: 2018-06-21

## 2018-06-21 RX ORDER — PREGABALIN 75 MG/1
75 CAPSULE ORAL 2 TIMES DAILY
Qty: 60 CAPSULE | Refills: 2 | Status: SHIPPED | OUTPATIENT
Start: 2018-06-21 | End: 2018-09-13 | Stop reason: SDUPTHER

## 2018-06-21 RX ORDER — CHLORZOXAZONE 500 MG/1
500 TABLET ORAL 4 TIMES DAILY PRN
Qty: 30 TABLET | Refills: 0 | Status: SHIPPED | OUTPATIENT
Start: 2018-06-21 | End: 2018-06-21 | Stop reason: SDUPTHER

## 2018-06-21 RX ORDER — BUPRENORPHINE 10 UG/H
1 PATCH TRANSDERMAL
Qty: 4 PATCH | Refills: 0 | Status: SHIPPED | OUTPATIENT
Start: 2018-07-15 | End: 2018-07-16 | Stop reason: ALTCHOICE

## 2018-06-21 RX ORDER — CHLORZOXAZONE 500 MG/1
500 TABLET ORAL 4 TIMES DAILY PRN
Qty: 30 TABLET | Refills: 2 | Status: SHIPPED | OUTPATIENT
Start: 2018-06-21 | End: 2018-06-22

## 2018-06-21 NOTE — TELEPHONE ENCOUNTER
Patient saw Dr Cindy Valenzuela this morning & was questioned about her Nexium  She checked her office summary & is concerned about the medications  Please call her @ [phone]  Thank you

## 2018-06-21 NOTE — TELEPHONE ENCOUNTER
Spoke with pt when this nurse was going over medications wit pt at visit today pt thought she took pepcid and not nexium   Pt stated that she does take nexium 40mg

## 2018-06-21 NOTE — TELEPHONE ENCOUNTER
Pt called stating that her insurance company will not cover the [address]  Asking what else she can take  Pt can be reached at [phone]

## 2018-06-21 NOTE — PROGRESS NOTES
Pt is c/o neck pain that radiates to b/l shoulders  Assessment:  1  Myofascial pain    2  Chronic myofascial pain    3  Cervicalgia    4  Pain syndrome, chronic    5  Paresthesias/numbness    6  Chronic left shoulder pain        Plan:  27-year-old female returning for follow-up of chronic bilateral shoulder pain status post multiple surgeries with paresthesias and numbness into her upper extremities in no specific dermatomal fashion  She also complains of bilateral neck pain which is mostly myofascial in nature  The patient did have an MRI of her cervical spine which did not show any compressive pathology and EMG of her upper extremities were within normal limits  The patient states that her neck pain is her major complaint today as she is having a difficult time with her range of motion of her neck  She denies any recent trauma or inciting event  The patient is currently taking Lyrica 75 mg b i d , baclofen 10 mg q 8 hours p r n , and a Butrans patch 10 micrograms/hour 1 patch transdermally weekly  She gets approximately 25% relief from the current pain medicine regimen and denies any side effects  She did try increasing the Lyrica to t i d , however this caused significant sedation in the past   1   I will prescribe physical therapy for the patient's neck pain and myofascial pain  2  We may consider trigger point injections into the cervical paraspinal musculature and trapezii bilaterally if she fails conservative therapy  3  I will discontinue Baclofen and trial methocarbamol 750 mg Q 6-8 hours p r n  for her myofascial pain  4  I will refill Lyrica 75 mg b i d   5   I will refill Butrans patch 10 micrograms/hour 1 patch transdermally weekly x3 months  The patient was given prescriptions dated for do not fill before July 17, 2018 with 2 refills  6   I will follow up the patient in 3 months    There are risks associated with opioid medications, including dependence, addiction and tolerance   The patient understands and agrees to use these medications only as prescribed  Potential side effects of the medications include, but are not limited to, constipation, drowsiness, addiction, impaired judgment and risk of fatal overdose if not taken as prescribed  The patient was warned against driving while taking sedation medications  Sharing medications is a felony  At this point in time, the patient is showing no signs of addiction, abuse, diversion or suicidal ideation  A urine drug screen was collected at today's office visit as part of our medication management protocol  The point of care testing results were appropriate for what was being prescribed  The specimen will be sent for confirmatory testing  The drug screen is medically necessary because the patient is either dependent on opioid medication or is being considered for opioid medication therapy and the results could impact ongoing or future treatment  The drug screen is to evaluate for the presences or absence of prescribed, non-prescribed, and/or illicit drugs/substances  [facility] Prescription Drug Monitoring Program report was reviewed and was appropriate       History of Present Illness: The patient is a 39 y o  female  returning for follow-up of chronic bilateral shoulder pain status post multiple surgeries with paresthesias and numbness into her upper extremities in no specific dermatomal fashion  She also complains of bilateral neck pain which is mostly myofascial in nature  The patient states that her neck pain is her major complaint today as she is having a difficult time with her range of motion of her neck  She denies any recent trauma or inciting event  The patient did have an MRI of her cervical spine which did not show any compressive pathology and EMG of her upper extremities were within normal limits      The patient is currently taking Lyrica 75 mg b i d , baclofen 10 mg q 8 hours p r n , and a Butrans patch 10 micrograms/hour 1 patch transdermally weekly  She gets approximately 25% relief from the current pain medicine regimen and denies any side effects  She did try increasing the Lyrica to t i d , however this caused significant sedation in the past   The patient rates her pain 8/10 on the pain is does not follow any particular pattern throughout the day  The pain is constant and described as burning, sharp, throbbing, cramping, pressure-like, and numbness  The pain is worse with exercise and bending and twisting her neck  The pain is alleviated with relaxation and sitting down  I have personally reviewed and/or updated the patient's past medical history, past surgical history, family history, social history, current medications, allergies, and vital signs today  Other than as stated above, the patient denies any interval changes in medications, medical condition, mental condition, symptoms, or allergies since the last office visit  Review of Systems  Review of Systems   Respiratory: Negative for shortness of breath  Cardiovascular: Negative for chest pain  Gastrointestinal: Negative for constipation, diarrhea, nausea and vomiting  Musculoskeletal: Negative for arthralgias, gait problem, joint swelling and myalgias  Difficulty walking  Decreased rom  Joint stiffness   Skin: Negative for rash  Neurological: Negative for dizziness, seizures and weakness  All other systems reviewed and are negative          Past Medical History:   Diagnosis Date    Anxiety     Arthritis     Right shoulder    Chronic pain disorder     Bilateral shoulders    Depression     Thyroid nodule     left side       Past Surgical History:   Procedure Laterality Date    APPENDECTOMY      DENTAL SURGERY      Dental implants    ORIF FINGER FRACTURE Left 6/7/2016    Procedure: RING FINGER PROXIMAL PHALANX FRACTURE O/R I/F ;  Surgeon: Sheree Patton MD;  [facility];  Service:     KS OPEN TX PHALANGEAL SHAFT FRACTURE PROX/MIDDLE EA Left 10/12/2016    Procedure: RING FINGER MIDDLE PHALANX FRACTURE OPEN REDUCTION W/ INTERNAL FIXATION, AND CONTRACTURE RELEASE OF PIP JOINT ;  Surgeon: Zhen Harris MD;  [facility];  Service: Orthopedics    NM REMOVAL DEEP IMPLANT Left 10/26/2017    Procedure: [place];  Surgeon: Jamal Banuelos MD;  Location:  MAIN OR;  Service: Orthopedics    SHOULDER SURGERY      bialt    WISDOM TOOTH EXTRACTION Bilateral        Family History   Problem Relation Age of Onset    Hypertension Mother     Asthma Mother     GI problems Mother     Stroke Mother     Hyperlipidemia Mother     Stroke Father     No Known Problems Brother     No Known Problems Brother     Breast cancer Maternal Aunt     Uterine cancer Maternal Aunt     Colon cancer Paternal Grandmother        Social History     Occupational History    Not on file  Social History Main Topics    Smoking status: Former Smoker     Years: 5 00    Smokeless tobacco: Never Used      Comment: Quit 3 years ago      Alcohol use No    Drug use: No    Sexual activity: No         Current Outpatient Prescriptions:     al mag oxide-diphenhydramine-lidocaine viscous (MAGIC MOUTHWASH) 1:1:1 suspension, SWISH AND SPIT EVERY 6 HOURS AS NEEDED, Disp: , Rfl: 0    baclofen 10 mg tablet, Take 1 tablet (10 mg total) by mouth 3 (three) times a day as needed for muscle spasms, Disp: 90 tablet, Rfl: 2    Buprenorphine (BUTRANS) 10 MCG/HR PTWK, Place 1 patch on the skin every 7 days, Disp: 4 patch, Rfl: 2    clonazePAM (KlonoPIN) 1 mg tablet, Take 1 mg by mouth 4 (four) times a day as needed, Disp: , Rfl:     esomeprazole (NexIUM) 40 MG capsule, TAKE ONE CAPSULE BY MOUTH EVERY DAY, Disp: 30 capsule, Rfl: 5    famotidine (PEPCID) 20 mg tablet, Take 1 tablet (20 mg total) by mouth daily at bedtime, Disp: 30 tablet, Rfl: 3    fluticasone (FLONASE) 50 mcg/act nasal spray, INSTILL 2 SPRAYS INTO EACH NOSTRIL ONCE DAILY, Disp: 3 Bottle, Rfl: 3    misoprostol (CYTOTEC) 200 mcg tablet, Place one pill in vagina the night before your appointment, and take one orally the morning of your appointment, Disp: 2 tablet, Rfl: 0    nicotine polacrilex (NICOTINE MINI) 4 MG lozenge, Apply 4 mg to the mouth or throat as needed for smoking cessation, Disp: , Rfl:     POLYETH GLYC-PROPYLENE GLYC NA, Take 8 oz by mouth 2 (two) times a day as needed, Disp: , Rfl:     pregabalin (LYRICA) 75 mg capsule, Take 1 capsule (75 mg total) by mouth 2 (two) times a day, Disp: 60 capsule, Rfl: 2    triamcinolone (KENALOG) 0 5 % cream, Apply 30 application topically 3 (three) times a day, Disp: , Rfl:     venlafaxine (EFFEXOR XR) 75 mg 24 hr capsule, Take 1 capsule by mouth daily, Disp: , Rfl:     zolpidem (AMBIEN) 10 mg tablet, Take 10 mg by mouth daily at bedtime as needed for sleep, Disp: , Rfl:     Allergies   Allergen Reactions    Naproxen GI Intolerance    Vancomycin Hives and Itching    Hydrocodone-Acetaminophen Hives     judie tylenol/past    Nsaids Other (See Comments)     ultram and naprosen       Physical Exam:    BP 96/70   Pulse (!) 53   Temp 97 6 °F (36 4 °C)   Wt 45 8 kg (101 lb)   BMI 17 89 kg/m²     Constitutional:normal, well developed, well nourished, alert, in no distress and non-toxic and no overt pain behavior  Eyes:anicteric  HEENT:grossly intact  Neck:supple, symmetric, trachea midline and no masses   Pulmonary:even and unlabored  Cardiovascular:No edema or pitting edema present  Skin:Normal without rashes or lesions and well hydrated  Psychiatric:Mood and affect appropriate  Neurologic:Cranial Nerves II-XII grossly intact  Musculoskeletal:normal gait  Bilateral cervical paraspinals and trapezii tender to palpation and ropy in texture  Limited range of motion of cervical spine in all planes  Bilateral upper extremity strength 5/5 in all muscle groups  Tenderness to palpation over the anterolateral aspects of her shoulders  Limited range of motion of shoulders in all planes secondary to pain  Negative Spurling's bilaterally  Imaging  No orders to display     MRI cervical spine wo contrast   Status: Final result   PACS Images     Show images for MRI cervical spine wo contrast   Order Report      Order Details   Study Result     MRI CERVICAL SPINE WITHOUT CONTRAST     INDICATION:  Chronic right-sided neck and shoulder pain  Numbness and paresthesias of the right arm and hand      COMPARISON:  10/7/2008      TECHNIQUE:  Sagittal T1, sagittal T2, sagittal inversion recovery, axial T2, axial  gradient          IMAGE QUALITY:  Diagnostic     FINDINGS:     ALIGNMENT:  Normal alignment of the cervical spine  No compression fracture  No subluxation  No scoliosis      MARROW SIGNAL:  Type I endplate marrow edema noted within the C6-7 endplates, degenerative in nature      CERVICAL AND VISUALIZED THORACIC CORD:  Normal signal within the visualized cord      PREVERTEBRAL AND PARASPINAL SOFT TISSUES:  Prevertebral and paraspinal soft tissues are unremarkable      VISUALIZED POSTERIOR FOSSA:  The visualized posterior fossa demonstrates no abnormal signal      CERVICAL DISC SPACES:          C2-C3:  Normal      C3-C4:  Normal      C4-C5:  Slight loss of disc height  Previously seen No disc herniation, canal stenosis or foraminal narrowing  Slight improvement compared to the prior exam      C5-C6:  No disc herniation, canal stenosis or foraminal narrowing      C6-C7:  Disc desiccation with slight annular bulging  No focal disc herniation or canal stenosis  No foraminal narrowing      C7-T1:  Normal      UPPER THORACIC DISC SPACES:  Normal      IMPRESSION:     Mild cervical degenerative disc disease  No disc herniation, canal stenosis or foraminal narrowing         Workstation performed: [id number]          No orders of the defined types were placed in this encounter

## 2018-06-22 RX ORDER — TIZANIDINE 2 MG/1
2 TABLET ORAL EVERY 8 HOURS PRN
Qty: 90 TABLET | Refills: 0 | Status: SHIPPED | OUTPATIENT
Start: 2018-06-22 | End: 2018-08-08 | Stop reason: SDUPTHER

## 2018-06-22 NOTE — TELEPHONE ENCOUNTER
Called [facility], appears that the Chlorzoxazone is not covered by insurance either, a prior auth would need to be done or Tizanidine or Flexeril are covered  Please advise

## 2018-06-25 ENCOUNTER — TELEPHONE (OUTPATIENT)
Dept: PAIN MEDICINE | Facility: MEDICAL CENTER | Age: 43
End: 2018-06-25

## 2018-06-25 NOTE — TELEPHONE ENCOUNTER
Pt is calling stating she was to call if the pain did not get any better  Pt is stating her pain level is a 9 and would like to schedule injections   Pt can be reached at [phone]

## 2018-07-10 ENCOUNTER — CLINICAL SUPPORT (OUTPATIENT)
Dept: PAIN MEDICINE | Facility: CLINIC | Age: 43
End: 2018-07-10
Payer: MEDICARE

## 2018-07-10 VITALS
WEIGHT: 101 LBS | DIASTOLIC BLOOD PRESSURE: 69 MMHG | TEMPERATURE: 99.8 F | HEART RATE: 85 BPM | BODY MASS INDEX: 17.89 KG/M2 | HEIGHT: 63 IN | SYSTOLIC BLOOD PRESSURE: 98 MMHG

## 2018-07-10 DIAGNOSIS — M79.18 CHRONIC MYOFASCIAL PAIN: ICD-10-CM

## 2018-07-10 DIAGNOSIS — G89.29 CHRONIC MYOFASCIAL PAIN: ICD-10-CM

## 2018-07-10 DIAGNOSIS — G89.4 PAIN SYNDROME, CHRONIC: ICD-10-CM

## 2018-07-10 DIAGNOSIS — M79.18 MYOFASCIAL PAIN: Primary | ICD-10-CM

## 2018-07-10 DIAGNOSIS — M54.2 CERVICALGIA: ICD-10-CM

## 2018-07-10 PROCEDURE — 20553 NJX 1/MLT TRIGGER POINTS 3/>: CPT | Performed by: ANESTHESIOLOGY

## 2018-07-10 NOTE — PROGRESS NOTES
Assessment:  1  Myofascial pain    2  Cervicalgia    3  Pain syndrome, chronic    4  Chronic myofascial pain        Plan:  40-year-old female with chronic myofascial pain, cervicalgia, and chronic shoulder pain presenting today for trigger point injections into the cervical paraspinal musculature, bilateral trapezii, and bilateral rhomboids  After discussing the risks, benefits, and alternatives to the procedure, the patient expressed understanding and wished to proceed  Procedural pause conducted to verify:  correct patient identity, procedure to be performed and as applicable, correct side and site, correct patient position, and availability of implants, special equipment and special requirements  The appropriate hyper irritable musculoskeletal tender points were marked with a sterile pen, prepped with alcohol and sterilely draped  After appropriate reproduction of pain at each of the tender points marked, a total of 10 mL of 0 25% bupivacaine and 40 mg of Depo-Medrol was injected after negative aspiration of air, CSF, heme, or other bodily fluids with a 1-1/2 inch 25-gauge needle  A total number of 3 muscle groups were injected  The patient was discharged with no apparent complications on their own power after an appropriate observation period  Complete risks and benefits including bleeding, infection, tissue reaction, nerve injury and allergic reaction were discussed  The approach was demonstrated using models and literature was provided  Verbal and written consent was obtained  My impressions and treatment recommendations were discussed in detail with the patient who verbalized understanding and had no further questions  Discharge instructions were provided  I personally saw and examined the patient and I agree with the above discussed plan of care  No orders of the defined types were placed in this encounter  No orders of the defined types were placed in this encounter        History of Present Illness:    Mariangel Castellanos is a 39 y o  female with history of chronic bilateral shoulder pain, chronic neck pain, and chronic myofascial pain presenting today for trigger point injections into her neck, trapezius, and bilateral rhomboids  The patient rates her pain a 9/10 on the pain does not follow any particular pattern throughout the day  The pain is constant and described as burning, dull, aching, sharp, throbbing, cramping, pressure-like, shooting, numbness, and pins and needles  She is currently taking Lyrica 75 mg b i d , tizanidine 2 mg q 8 hours p r n , and Butrans patch 10 micrograms/hour 1 patch transdermally weekly  She gets approximately 40% relief from the current medication regimen and denies any side effects  I have personally reviewed and/or updated the patient's past medical history, past surgical history, family history, social history, current medications, allergies, and vital signs today  Other than as stated above, the patient denies any interval changes in medications, medical condition, mental condition, symptoms, or allergies since the last office visit  Review of Systems:    Review of Systems   Respiratory: Negative for shortness of breath  Cardiovascular: Negative for chest pain  Gastrointestinal: Negative for constipation, diarrhea, nausea and vomiting  Musculoskeletal: Positive for gait problem  Negative for arthralgias, joint swelling and myalgias  Decreased ROM, Joint stiffness   Skin: Negative for rash  Neurological: Positive for weakness  Negative for dizziness and seizures  All other systems reviewed and are negative        Patient Active Problem List   Diagnosis    Acid reflux disease    Aftercare following surgery of the musculoskeletal system    Allergic rhinitis    Analgesic use    Avulsion fracture of middle phalanx of finger, closed, initial encounter    Bilateral hand pain    Cervical radiculopathy    Chronic cervical pain  Chronic cough    Chronic myofascial pain    Closed displaced fracture of proximal phalanx of left ring finger    Cold intolerance    Contracture of finger joint, left    Luetscher's syndrome    Eczema    Encounter for long-term opiate analgesic use    Esophageal reflux    Fatigue    Finger fracture, left    Finger pain    Gastroenteritis    Generalized anxiety disorder    Hearing loss    Impacted cerumen of right ear    Irritable bowel syndrome    Non-toxic uninodular goiter    Opioid dependence (HCC)    Pain in right shoulder    Pain syndrome, chronic    Painful orthopaedic hardware ([address] )    Paresthesias/numbness    Excessive thirst    Stiffness of finger joint of left hand    Suprascapular neuropathy    Disorder of bursae and tendons in shoulder region    Thyroid nodule    Xerostomia due to mouth breathing    Neck pain - Bilateral    Chronic bilateral low back pain without sciatica    Chronic pain syndrome - Bilateral    Myofascial pain syndrome - Bilateral    Chronic diarrhea    Cervicalgia    Myofascial pain    Chronic left shoulder pain       Past Medical History:   Diagnosis Date    Anxiety     Arthritis     Right shoulder    Chronic pain disorder     Bilateral shoulders    Depression     Thyroid nodule     left side       Past Surgical History:   Procedure Laterality Date    APPENDECTOMY      DENTAL SURGERY      Dental implants    ORIF FINGER FRACTURE Left 6/7/2016    Procedure: RING FINGER PROXIMAL PHALANX FRACTURE O/R I/F ;  Surgeon: Kristen Jurado MD;  [facility];  Service:     SD OPEN TX PHALANGEAL SHAFT FRACTURE PROX/MIDDLE EA Left 10/12/2016    Procedure: RING FINGER MIDDLE PHALANX FRACTURE OPEN REDUCTION W/ INTERNAL FIXATION, AND CONTRACTURE RELEASE OF PIP JOINT ;  Surgeon: Kristen Jurado MD;  [facility];  Service: Orthopedics    SD REMOVAL DEEP IMPLANT Left 10/26/2017    Procedure: [address] FINGER HARDWARE REMOVAL;  Surgeon: Lei Bob Prabha Abraham MD;  Location:  MAIN OR;  Service: Orthopedics    SHOULDER SURGERY      bialt    WISDOM TOOTH EXTRACTION Bilateral        Family History   Problem Relation Age of Onset   [facility] Hypertension Mother     Asthma Mother     GI problems Mother     Stroke Mother     Hyperlipidemia Mother     Stroke Father     No Known Problems Brother     No Known Problems Brother     Breast cancer Maternal Aunt     Uterine cancer Maternal Aunt     Colon cancer Paternal Grandmother        Social History     Occupational History    Not on file  Social History Main Topics    Smoking status: Former Smoker     Years: 5 00    Smokeless tobacco: Never Used      Comment: Quit 3 years ago      Alcohol use No    Drug use: No    Sexual activity: No       Current Outpatient Prescriptions on File Prior to Visit   Medication Sig    [START ON 7/15/2018] Buprenorphine (BUTRANS) 10 MCG/HR PTWK Place 1 patch on the skin every 7 days    clonazePAM (KlonoPIN) 1 mg tablet Take 1 mg by mouth 4 (four) times a day as needed    famotidine (PEPCID) 20 mg tablet Take 1 tablet (20 mg total) by mouth daily at bedtime    fluticasone (FLONASE) 50 mcg/act nasal spray INSTILL 2 SPRAYS INTO EACH NOSTRIL ONCE DAILY    misoprostol (CYTOTEC) 200 mcg tablet Place one pill in vagina the night before your appointment, and take one orally the morning of your appointment    nicotine polacrilex (NICOTINE MINI) 4 MG lozenge Apply 4 mg to the mouth or throat as needed for smoking cessation    POLYETH GLYC-PROPYLENE GLYC NA Take 8 oz by mouth 2 (two) times a day as needed    pregabalin (LYRICA) 75 mg capsule Take 1 capsule (75 mg total) by mouth 2 (two) times a day    tiZANidine (ZANAFLEX) 2 mg tablet Take 1 tablet (2 mg total) by mouth every 8 (eight) hours as needed for muscle spasms    triamcinolone (KENALOG) 0 5 % cream Apply 30 application topically 3 (three) times a day    venlafaxine (EFFEXOR XR) 75 mg 24 hr capsule Take 1 capsule by mouth daily    zolpidem (AMBIEN) 10 mg tablet Take 10 mg by mouth daily at bedtime as needed for sleep     No current facility-administered medications on file prior to visit  Allergies   Allergen Reactions    Naproxen GI Intolerance    Vancomycin Hives and Itching    Hydrocodone-Acetaminophen Hives     judie tylenol/past    Nsaids Other (See Comments)     ultram and naprosen       Physical Exam:    BP 98/69   Pulse 85   Temp 99 8 °F (37 7 °C)   Ht 5' 3" (1 6 m)   Wt 45 8 kg (101 lb)   BMI 17 89 kg/m²     Constitutional: normal, well developed, well nourished, alert, in no distress and non-toxic and no overt pain behavior  Eyes: anicteric  HEENT: grossly intact  Neck: supple, symmetric, trachea midline and no masses   Pulmonary:even and unlabored  Cardiovascular:No edema or pitting edema present  Skin:Normal without rashes or lesions and well hydrated  Psychiatric:Mood and affect appropriate  Neurologic:Cranial Nerves II-XII grossly intact  Musculoskeletal:normal gait  Bilateral cervical paraspinals and trapezii tender to palpation ropy in texture  Muscle spasms noted of bilateral rhomboids in tender to palpation      Imaging

## 2018-07-16 ENCOUNTER — OFFICE VISIT (OUTPATIENT)
Dept: OBGYN CLINIC | Facility: CLINIC | Age: 43
End: 2018-07-16
Payer: MEDICARE

## 2018-07-16 VITALS — WEIGHT: 100 LBS | DIASTOLIC BLOOD PRESSURE: 60 MMHG | SYSTOLIC BLOOD PRESSURE: 106 MMHG | BODY MASS INDEX: 17.71 KG/M2

## 2018-07-16 DIAGNOSIS — N93.9 ABNORMAL UTERINE BLEEDING (AUB): Primary | ICD-10-CM

## 2018-07-16 LAB — SL AMB POCT URINE HCG: NEGATIVE

## 2018-07-16 PROCEDURE — 88305 TISSUE EXAM BY PATHOLOGIST: CPT | Performed by: PATHOLOGY

## 2018-07-16 PROCEDURE — 58100 BIOPSY OF UTERUS LINING: CPT | Performed by: OBSTETRICS & GYNECOLOGY

## 2018-07-16 PROCEDURE — 81025 URINE PREGNANCY TEST: CPT | Performed by: OBSTETRICS & GYNECOLOGY

## 2018-07-16 NOTE — PROGRESS NOTES
Endometrial biopsy  Date/Time: 7/16/2018 4:33 PM  Performed by: Sanjana Rocha  Authorized by: Sanjana Rocha     Consent:     Consent obtained:  Written    Consent given by:  Patient    Procedural risks discussed:  Bleeding, infection and repeat procedure    Patient questions answered: yes      Patient agrees, verbalizes understanding, and wants to proceed: yes    Indication:     Indications:  Other disorder of menstruation and other abnormal bleeding from female genital tract    Pre-procedure:     Premeds:  Ibuprofen  Procedure:     Procedure: endometrial biopsy with Pipelle      A bivalve speculum was placed in the vagina: yes      Cervix cleaned and prepped: yes      The cervix was dilated: yes      Uterus sounded: yes      Uterus sound depth (cm):  6    Specimen collected: specimen collected and sent to pathology      Patient tolerated procedure well with no complications: no    Findings:     Uterus size:  Non-gravid    Cervix: normal      Adnexa: normal

## 2018-08-08 ENCOUNTER — TELEPHONE (OUTPATIENT)
Dept: PAIN MEDICINE | Facility: MEDICAL CENTER | Age: 43
End: 2018-08-08

## 2018-08-08 DIAGNOSIS — M79.18 MYOFASCIAL PAIN: ICD-10-CM

## 2018-08-08 RX ORDER — TIZANIDINE 2 MG/1
2 TABLET ORAL EVERY 8 HOURS PRN
Qty: 90 TABLET | Refills: 2 | Status: SHIPPED | OUTPATIENT
Start: 2018-08-08 | End: 2018-09-13 | Stop reason: SDUPTHER

## 2018-08-08 NOTE — TELEPHONE ENCOUNTER
The patient was given a prescription for her Butrans patch dated July 17, 2018 with 2 refills  Please call pharmacy to make sure the prescription is there  If not I will send a new one    Refill of tizanidine sent to pharmacy

## 2018-08-08 NOTE — TELEPHONE ENCOUNTER
Pt contacted Call Center requested refill of their medication  Medication Name:  Tizanidine    Dosage of Med:  2 mg     Frequency of Med:  Take 1 every 8 hours     Remaining Medication:  30 left      Pharmacy and Location:  [facility] on [address]  Thank you

## 2018-08-08 NOTE — TELEPHONE ENCOUNTER
Patient called states she needs a refill of Butrans patch 10 micrograms/hour 1 patch transdermally weekly to be called into [facility]   Please call pt to confirm rx was [facility]

## 2018-08-09 ENCOUNTER — TELEPHONE (OUTPATIENT)
Dept: GASTROENTEROLOGY | Facility: MEDICAL CENTER | Age: 43
End: 2018-08-09

## 2018-08-09 DIAGNOSIS — M79.18 MYOFASCIAL PAIN: ICD-10-CM

## 2018-08-10 RX ORDER — TIZANIDINE 2 MG/1
2 TABLET ORAL EVERY 8 HOURS PRN
Qty: 90 TABLET | Refills: 0 | OUTPATIENT
Start: 2018-08-10

## 2018-08-21 ENCOUNTER — TELEPHONE (OUTPATIENT)
Dept: GASTROENTEROLOGY | Facility: MEDICAL CENTER | Age: 43
End: 2018-08-21

## 2018-09-13 ENCOUNTER — OFFICE VISIT (OUTPATIENT)
Dept: PAIN MEDICINE | Facility: CLINIC | Age: 43
End: 2018-09-13
Payer: MEDICARE

## 2018-09-13 VITALS
WEIGHT: 105.4 LBS | DIASTOLIC BLOOD PRESSURE: 69 MMHG | HEIGHT: 63 IN | TEMPERATURE: 97.9 F | BODY MASS INDEX: 18.68 KG/M2 | SYSTOLIC BLOOD PRESSURE: 107 MMHG | RESPIRATION RATE: 16 BRPM | HEART RATE: 59 BPM

## 2018-09-13 DIAGNOSIS — G89.29 CHRONIC BILATERAL LOW BACK PAIN WITHOUT SCIATICA: ICD-10-CM

## 2018-09-13 DIAGNOSIS — G89.29 CHRONIC LEFT SHOULDER PAIN: ICD-10-CM

## 2018-09-13 DIAGNOSIS — IMO0001 PARESTHESIAS/NUMBNESS: ICD-10-CM

## 2018-09-13 DIAGNOSIS — M54.2 CERVICALGIA: ICD-10-CM

## 2018-09-13 DIAGNOSIS — M25.512 CHRONIC LEFT SHOULDER PAIN: ICD-10-CM

## 2018-09-13 DIAGNOSIS — M54.2 NECK PAIN: ICD-10-CM

## 2018-09-13 DIAGNOSIS — M79.18 MYOFASCIAL PAIN: ICD-10-CM

## 2018-09-13 DIAGNOSIS — M54.50 CHRONIC BILATERAL LOW BACK PAIN WITHOUT SCIATICA: ICD-10-CM

## 2018-09-13 DIAGNOSIS — M54.12 CERVICAL RADICULOPATHY: ICD-10-CM

## 2018-09-13 DIAGNOSIS — M54.42 ACUTE BILATERAL LOW BACK PAIN WITH BILATERAL SCIATICA: Primary | ICD-10-CM

## 2018-09-13 DIAGNOSIS — M54.41 ACUTE BILATERAL LOW BACK PAIN WITH BILATERAL SCIATICA: Primary | ICD-10-CM

## 2018-09-13 DIAGNOSIS — M54.2 CHRONIC CERVICAL PAIN: ICD-10-CM

## 2018-09-13 DIAGNOSIS — G89.29 CHRONIC CERVICAL PAIN: ICD-10-CM

## 2018-09-13 DIAGNOSIS — G89.4 CHRONIC PAIN SYNDROME: ICD-10-CM

## 2018-09-13 DIAGNOSIS — M79.18 MYOFASCIAL PAIN SYNDROME: ICD-10-CM

## 2018-09-13 PROCEDURE — 99214 OFFICE O/P EST MOD 30 MIN: CPT | Performed by: NURSE PRACTITIONER

## 2018-09-13 RX ORDER — BUPRENORPHINE 10 UG/H
PATCH, EXTENDED RELEASE TRANSDERMAL
Refills: 2 | COMMUNITY
Start: 2018-07-18 | End: 2018-09-17 | Stop reason: SDUPTHER

## 2018-09-13 RX ORDER — PREGABALIN 75 MG/1
75 CAPSULE ORAL 3 TIMES DAILY
Qty: 90 CAPSULE | Refills: 1 | Status: SHIPPED | OUTPATIENT
Start: 2018-09-13 | End: 2018-11-08 | Stop reason: SDUPTHER

## 2018-09-13 RX ORDER — TIZANIDINE 2 MG/1
TABLET ORAL
Qty: 90 TABLET | Refills: 2 | Status: SHIPPED | OUTPATIENT
Start: 2018-09-13 | End: 2018-11-08 | Stop reason: SDUPTHER

## 2018-09-13 RX ORDER — BUPRENORPHINE 10 UG/H
PATCH, EXTENDED RELEASE TRANSDERMAL
Refills: 0 | Status: CANCELLED | OUTPATIENT
Start: 2018-09-13

## 2018-09-13 NOTE — PROGRESS NOTES
Assessment:  1  Acute bilateral low back pain with bilateral sciatica    2  Cervical radiculopathy    3  Myofascial pain syndrome - Bilateral    4  Chronic cervical pain    5  Chronic left shoulder pain    6  Myofascial pain    7  Cervicalgia    8  Chronic pain syndrome - Bilateral    9  Chronic bilateral low back pain without sciatica    10  Neck pain - Bilateral    11  Paresthesias/numbness        Plan:  1  I will prescribe the patient [facility] based physical therapy for her low back and lower extremity pain complaints  She is partaking in physical therapy for her neck at this time as well  2   I will increase the patient's Lyrica to 75 mg t i d  for her neuropathic pain complaints  She is tolerating twice a day well and feels like she would like to try and increase this medication  I advised the patient that if they experience any side effects or issues with the changes in their medication regiment, they should give our office a call to discuss  I also advised the patient not to drive or operate machinery until they see how the changes in the medication regimen affects them  The patient was agreeable and verbalized an understanding  3   The patient can continue on Butrans 10 mcg/hr 1 patch transdermally weekly  Please note patient does not need a refill this medication today as she was given a 3 month supply at the last office visit and still has 2 refills remaining  4   The patient is doing well and tizanidine, however she does experience severe muscle spasms in her low back, therefore I will increase the medication to tizanidine 2 mg 1-2 tablets t i d  p r n  myofascial pain  I advised the patient that if they experience any side effects or issues with the changes in their medication regiment, they should give our office a call to discuss  I also advised the patient not to drive or operate machinery until they see how the changes in the medication regimen affects them   The patient was agreeable and verbalized an understanding  5   If the patient fails conservative therapy, may consider an MRI of the lumbar spine  6   Will repeat trigger point injections p r n   7   The patient will continue with her home exercise program  8  The patient will follow-up in 8 weeks for medication prescription refill and reevaluation  The patient was advised to contact the office should their symptoms worsen in the interim  The patient was agreeable and verbalized an understanding  [place] Prescription Drug Monitoring Program report was reviewed and was appropriate     There are risks associated with opioid medications, including dependence, addiction and tolerance  The patient understands and agrees to use these medications only as prescribed  Potential side effects of the medications include, but are not limited to, constipation, drowsiness, addiction, impaired judgment and risk of fatal overdose if not taken as prescribed  The patient was warned against driving while taking sedation medications  Sharing medications is a felony  At this point in time, the patient is showing no signs of addiction, abuse, diversion or suicidal ideation  I attest that I have spent at least 25 minutes face to face with the patient and that at least 50% of the time was spent educating and/or discussing the patient's symptoms and treatment plan options  History of Present Illness: The patient is a 39 y o  female last seen on June 21, 2018 who presents for a follow up office visit in regards to chronic pain secondary to multiple surgeries, chronic myofascial pain, cervical spondylosis and low back pain  The patient is currently status post cervical paraspinal, bilateral trapezii and bilateral rhomboid trigger point injections with great relief of her myofascial neck pain  She currently rates this pain a 2/10 on the numeric pain rating scale      At today's office visit, however, her main pain complaint is her low back pain that does occasionally radiate in the posterior aspect of her bilateral lower extremities, left side greater than right  She denies any inciting event or trauma  She states that at times, the pain is so severe that she is unable to get out of bed in the morning  She denies any bowel or bladder incontinence or saddle anesthesia  There does appear to be a myofascial component of her low back pain as well as she states that it feels like sharp spasms in her low back  The tizanidine does help with the pain, however not enough to make a real difference  Although this issue is relatively new as it is only been present for a week, she does state that she did have low back issues as a kid and remembers a time where she was bed ridden for months on end and had to be home schooled  She has not done any physical therapy for her low back pain at this time  She does not have any imaging of her low back  Current pain medications includes:  Butrans patch 10 micrograms/hour, Lyrica 75 mg b i d , and tizanidine 2 mg 1 tablet t i d  p r n     The patient reports that this regimen is providing 30% pain relief  The patient is reporting no side effects from this pain medication regimen  Last Urine Drug Screen: 6/21/18    I have personally reviewed and/or updated the patient's past medical history, past surgical history, family history, social history, current medications, allergies, and vital signs today  Review of Systems:    Review of Systems      Past Medical History:   Diagnosis Date    Anxiety     Arthritis     Right shoulder    Chronic pain disorder     Bilateral shoulders    Depression     Fibroid May 14,2018    Removed in office    biopsy normal    Thyroid nodule     left side    Urinary tract infection     Since a child       Past Surgical History:   Procedure Laterality Date    APPENDECTOMY      DENTAL SURGERY      Dental implants    ORIF FINGER FRACTURE Left 6/7/2016 Procedure: RING FINGER PROXIMAL PHALANX FRACTURE O/R I/F ;  Surgeon: Claudeen Ewing, MD;  [facility];  Service:     AL OPEN TX PHALANGEAL SHAFT FRACTURE PROX/MIDDLE EA Left 10/12/2016    Procedure: RING FINGER MIDDLE PHALANX FRACTURE OPEN REDUCTION W/ INTERNAL FIXATION, AND CONTRACTURE RELEASE OF PIP JOINT ;  Surgeon: Claudeen Ewing, MD;  [facility];  Service: Orthopedics    AL REMOVAL DEEP IMPLANT Left 10/26/2017    Procedure: [address] FINGER HARDWARE REMOVAL;  Surgeon: Jose Whitaker MD;  [facility];  Service: Orthopedics    SHOULDER SURGERY      bialt    WISDOM TOOTH EXTRACTION Bilateral        Family History   Problem Relation Age of Onset   [facility] Hypertension Mother     Asthma Mother     GI problems Mother     Stroke Mother     Hyperlipidemia Mother     Osteoarthritis Mother     Stroke Father     No Known Problems Brother     No Known Problems Brother     Breast cancer Maternal Aunt     Uterine cancer Maternal Aunt     Colon cancer Paternal Grandmother     Colon cancer Maternal Grandmother     Heart failure Maternal Grandmother     Colon cancer Maternal Grandfather     Diabetes Maternal Grandfather     Cervical cancer Maternal Aunt     Breast cancer Cousin         maternal side        Social History     Occupational History    Not on file       Social History Main Topics    Smoking status: Former Smoker     Packs/day: 0 25     Years: 5 00     Types: Cigarettes     Quit date: 1/8/2015    Smokeless tobacco: Never Used      Comment: Social use    Alcohol use No    Drug use: No    Sexual activity: No         Current Outpatient Prescriptions:     BUTRANS 10 MCG/HR PTWK, PLACE 1 PATCH ON SKIN EVERY 7 DAYS, Disp: , Rfl: 2    clonazePAM (KlonoPIN) 1 mg tablet, Take 1 mg by mouth 4 (four) times a day as needed, Disp: , Rfl:     famotidine (PEPCID) 20 mg tablet, Take 1 tablet (20 mg total) by mouth daily at bedtime, Disp: 30 tablet, Rfl: 3    fluticasone (FLONASE) 50 mcg/act nasal spray, INSTILL 2 SPRAYS INTO EACH NOSTRIL ONCE DAILY, Disp: 3 Bottle, Rfl: 3    nicotine polacrilex (NICOTINE MINI) 4 MG lozenge, Apply 4 mg to the mouth or throat as needed for smoking cessation, Disp: , Rfl:     pregabalin (LYRICA) 75 mg capsule, Take 1 capsule (75 mg total) by mouth 3 (three) times a day, Disp: 90 capsule, Rfl: 1    tiZANidine (ZANAFLEX) 2 mg tablet, Take 1-2 PO TID PRN, Disp: 90 tablet, Rfl: 2    triamcinolone (KENALOG) 0 5 % cream, Apply 30 application topically 3 (three) times a day, Disp: , Rfl:     venlafaxine (EFFEXOR XR) 75 mg 24 hr capsule, Take 1 capsule by mouth daily, Disp: , Rfl:     zolpidem (AMBIEN) 10 mg tablet, Take 10 mg by mouth daily at bedtime as needed for sleep, Disp: , Rfl:     Allergies   Allergen Reactions    Naproxen GI Intolerance    Vancomycin Hives and Itching    Hydrocodone-Acetaminophen Hives     judie tylenol/past    Nsaids Other (See Comments)     ultram and naprosen       Physical Exam:    /69 (BP Location: Right arm, Patient Position: Sitting, Cuff Size: Adult)   Pulse 59   Temp 97 9 °F (36 6 °C) (Oral)   Resp 16   Ht 5' 3" (1 6 m)   Wt 47 8 kg (105 lb 6 4 oz)   BMI 18 67 kg/m²     Constitutional:normal, well developed, well nourished, alert, in no distress and non-toxic and no overt pain behavior  Eyes:anicteric  HEENT:grossly intact  Neck:supple, symmetric, trachea midline and no masses   Pulmonary:even and unlabored  Cardiovascular:No edema or pitting edema present  Skin:Normal without rashes or lesions and well hydrated  Psychiatric:Mood and affect appropriate  Neurologic:Cranial Nerves II-XII grossly intact  Musculoskeletal:normal gait  Bilateral cervical paraspinals and bilateral trapezii mildly tender to palpation  Bilateral upper extremity strength 5/5 in all muscle groups  Negative Spurling's bilaterally      Lumbar Spine Exam    Appearance:  Normal lordosis  Palpation/Tenderness:  left lumbar paraspinal tenderness  right lumbar paraspinal tenderness  Motor Strength:   Strength 5/5 in all muscle groups of the bilateral lower extremities  Special Tests:   Positive seated straight leg raise on the left and negative on the right      Imaging  No orders to display     MRI CERVICAL SPINE WITHOUT CONTRAST     INDICATION:  Chronic right-sided neck and shoulder pain   Numbness and paresthesias of the right arm and hand      COMPARISON:  10/7/2008      TECHNIQUE:  Sagittal T1, sagittal T2, sagittal inversion recovery, axial T2, axial  gradient          IMAGE QUALITY:  Diagnostic     FINDINGS:     ALIGNMENT:  Normal alignment of the cervical spine   No compression fracture   No subluxation   No scoliosis      MARROW SIGNAL:  Type I endplate marrow edema noted within the C6-7 endplates, degenerative in nature      CERVICAL AND VISUALIZED THORACIC CORD:  Normal signal within the visualized cord      PREVERTEBRAL AND PARASPINAL SOFT TISSUES:  Prevertebral and paraspinal soft tissues are unremarkable      VISUALIZED POSTERIOR FOSSA:  The visualized posterior fossa demonstrates no abnormal signal      CERVICAL DISC SPACES:          C2-C3:  Normal      C3-C4:  Normal      C4-C5:  Slight loss of disc height   Previously seen No disc herniation, canal stenosis or foraminal narrowing   Slight improvement compared to the prior exam      C5-C6:  No disc herniation, canal stenosis or foraminal narrowing      C6-C7:  Disc desiccation with slight annular bulging   No focal disc herniation or canal stenosis   No foraminal narrowing      C7-T1:  Normal      UPPER THORACIC DISC SPACES:  Normal      IMPRESSION:     Mild cervical degenerative disc disease   No disc herniation, canal stenosis or foraminal narrowing        Orders Placed This Encounter   Procedures    Ambulatory referral to Physical Therapy

## 2018-09-15 DIAGNOSIS — G89.4 PAIN SYNDROME, CHRONIC: ICD-10-CM

## 2018-09-17 DIAGNOSIS — G89.4 PAIN SYNDROME, CHRONIC: Primary | ICD-10-CM

## 2018-09-17 RX ORDER — BUPRENORPHINE 10 UG/H
1 PATCH, EXTENDED RELEASE TRANSDERMAL WEEKLY
Qty: 4 PATCH | Refills: 2 | Status: SHIPPED | OUTPATIENT
Start: 2018-09-17 | End: 2018-11-08 | Stop reason: SDUPTHER

## 2018-09-17 NOTE — TELEPHONE ENCOUNTER
Pt requesting refill of Butran's patches, seen by you on 9/13  S/w pharmacist, pt does not have any refills on the Butran's patches, looks like one was recorded on 7/18 with 2 refills, by Dr Betzaida Durbin ordered in June but they do not have any refills for her

## 2018-09-18 DIAGNOSIS — K21.9 CHRONIC GERD: ICD-10-CM

## 2018-09-18 RX ORDER — BUPRENORPHINE 10 UG/H
1 PATCH, EXTENDED RELEASE TRANSDERMAL
Qty: 4 PATCH | Refills: 0 | OUTPATIENT
Start: 2018-09-18

## 2018-09-19 RX ORDER — FAMOTIDINE 20 MG/1
20 TABLET, FILM COATED ORAL
Qty: 30 TABLET | Refills: 3 | Status: SHIPPED | OUTPATIENT
Start: 2018-09-19 | End: 2019-04-23 | Stop reason: SDUPTHER

## 2018-10-05 ENCOUNTER — TRANSCRIBE ORDERS (OUTPATIENT)
Dept: LAB | Facility: CLINIC | Age: 43
End: 2018-10-05

## 2018-10-05 ENCOUNTER — LAB (OUTPATIENT)
Dept: LAB | Facility: CLINIC | Age: 43
End: 2018-10-05
Payer: MEDICARE

## 2018-10-05 ENCOUNTER — OFFICE VISIT (OUTPATIENT)
Dept: FAMILY MEDICINE CLINIC | Facility: CLINIC | Age: 43
End: 2018-10-05
Payer: MEDICARE

## 2018-10-05 VITALS
BODY MASS INDEX: 19 KG/M2 | RESPIRATION RATE: 16 BRPM | WEIGHT: 107.2 LBS | HEART RATE: 56 BPM | SYSTOLIC BLOOD PRESSURE: 90 MMHG | HEIGHT: 63 IN | TEMPERATURE: 97.4 F | DIASTOLIC BLOOD PRESSURE: 64 MMHG

## 2018-10-05 DIAGNOSIS — M54.50 CHRONIC BILATERAL LOW BACK PAIN WITHOUT SCIATICA: ICD-10-CM

## 2018-10-05 DIAGNOSIS — G89.29 CHRONIC BILATERAL LOW BACK PAIN WITHOUT SCIATICA: ICD-10-CM

## 2018-10-05 DIAGNOSIS — N92.1 MENORRHAGIA WITH IRREGULAR CYCLE: ICD-10-CM

## 2018-10-05 DIAGNOSIS — Z23 NEED FOR INFLUENZA VACCINATION: ICD-10-CM

## 2018-10-05 DIAGNOSIS — R53.83 FATIGUE, UNSPECIFIED TYPE: ICD-10-CM

## 2018-10-05 DIAGNOSIS — R53.83 FATIGUE, UNSPECIFIED TYPE: Primary | ICD-10-CM

## 2018-10-05 DIAGNOSIS — J30.1 ALLERGIC RHINITIS DUE TO POLLEN, UNSPECIFIED SEASONALITY: ICD-10-CM

## 2018-10-05 DIAGNOSIS — E55.9 VITAMIN D DEFICIENCY: ICD-10-CM

## 2018-10-05 LAB
25(OH)D3 SERPL-MCNC: 26.2 NG/ML (ref 30–100)
ALBUMIN SERPL BCP-MCNC: 3.8 G/DL (ref 3.5–5)
ALP SERPL-CCNC: 53 U/L (ref 46–116)
ALT SERPL W P-5'-P-CCNC: 21 U/L (ref 12–78)
ANION GAP SERPL CALCULATED.3IONS-SCNC: 5 MMOL/L (ref 4–13)
AST SERPL W P-5'-P-CCNC: 10 U/L (ref 5–45)
BASOPHILS # BLD AUTO: 0.03 THOUSANDS/ΜL (ref 0–0.1)
BASOPHILS NFR BLD AUTO: 1 % (ref 0–1)
BILIRUB SERPL-MCNC: 0.6 MG/DL (ref 0.2–1)
BUN SERPL-MCNC: 15 MG/DL (ref 5–25)
CALCIUM SERPL-MCNC: 9.1 MG/DL (ref 8.3–10.1)
CHLORIDE SERPL-SCNC: 106 MMOL/L (ref 100–108)
CO2 SERPL-SCNC: 27 MMOL/L (ref 21–32)
CREAT SERPL-MCNC: 0.88 MG/DL (ref 0.6–1.3)
EOSINOPHIL # BLD AUTO: 0.11 THOUSAND/ΜL (ref 0–0.61)
EOSINOPHIL NFR BLD AUTO: 2 % (ref 0–6)
ERYTHROCYTE [DISTWIDTH] IN BLOOD BY AUTOMATED COUNT: 12.5 % (ref 11.6–15.1)
FERRITIN SERPL-MCNC: 10 NG/ML (ref 8–388)
GFR SERPL CREATININE-BSD FRML MDRD: 80 ML/MIN/1.73SQ M
GLUCOSE P FAST SERPL-MCNC: 93 MG/DL (ref 65–99)
HCT VFR BLD AUTO: 42 % (ref 34.8–46.1)
HGB BLD-MCNC: 13.5 G/DL (ref 11.5–15.4)
IMM GRANULOCYTES # BLD AUTO: 0.01 THOUSAND/UL (ref 0–0.2)
IMM GRANULOCYTES NFR BLD AUTO: 0 % (ref 0–2)
LYMPHOCYTES # BLD AUTO: 2.76 THOUSANDS/ΜL (ref 0.6–4.47)
LYMPHOCYTES NFR BLD AUTO: 44 % (ref 14–44)
MCH RBC QN AUTO: 31.1 PG (ref 26.8–34.3)
MCHC RBC AUTO-ENTMCNC: 32.1 G/DL (ref 31.4–37.4)
MCV RBC AUTO: 97 FL (ref 82–98)
MONOCYTES # BLD AUTO: 0.48 THOUSAND/ΜL (ref 0.17–1.22)
MONOCYTES NFR BLD AUTO: 8 % (ref 4–12)
NEUTROPHILS # BLD AUTO: 2.96 THOUSANDS/ΜL (ref 1.85–7.62)
NEUTS SEG NFR BLD AUTO: 45 % (ref 43–75)
NRBC BLD AUTO-RTO: 0 /100 WBCS
PLATELET # BLD AUTO: 183 THOUSANDS/UL (ref 149–390)
PMV BLD AUTO: 12.1 FL (ref 8.9–12.7)
POTASSIUM SERPL-SCNC: 4.2 MMOL/L (ref 3.5–5.3)
PROT SERPL-MCNC: 7.5 G/DL (ref 6.4–8.2)
RBC # BLD AUTO: 4.34 MILLION/UL (ref 3.81–5.12)
SODIUM SERPL-SCNC: 138 MMOL/L (ref 136–145)
T4 FREE SERPL-MCNC: 0.85 NG/DL (ref 0.76–1.46)
TSH SERPL DL<=0.05 MIU/L-ACNC: 2.4 UIU/ML (ref 0.36–3.74)
WBC # BLD AUTO: 6.35 THOUSAND/UL (ref 4.31–10.16)

## 2018-10-05 PROCEDURE — 36415 COLL VENOUS BLD VENIPUNCTURE: CPT

## 2018-10-05 PROCEDURE — G0008 ADMIN INFLUENZA VIRUS VAC: HCPCS | Performed by: NURSE PRACTITIONER

## 2018-10-05 PROCEDURE — 84443 ASSAY THYROID STIM HORMONE: CPT

## 2018-10-05 PROCEDURE — 82728 ASSAY OF FERRITIN: CPT

## 2018-10-05 PROCEDURE — 90686 IIV4 VACC NO PRSV 0.5 ML IM: CPT | Performed by: NURSE PRACTITIONER

## 2018-10-05 PROCEDURE — 80053 COMPREHEN METABOLIC PANEL: CPT

## 2018-10-05 PROCEDURE — 85025 COMPLETE CBC W/AUTO DIFF WBC: CPT

## 2018-10-05 PROCEDURE — 82306 VITAMIN D 25 HYDROXY: CPT

## 2018-10-05 PROCEDURE — 99214 OFFICE O/P EST MOD 30 MIN: CPT | Performed by: NURSE PRACTITIONER

## 2018-10-05 PROCEDURE — 84439 ASSAY OF FREE THYROXINE: CPT

## 2018-10-05 RX ORDER — FLUTICASONE PROPIONATE 50 MCG
2 SPRAY, SUSPENSION (ML) NASAL DAILY
Qty: 3 BOTTLE | Refills: 0 | Status: SHIPPED | OUTPATIENT
Start: 2018-10-05 | End: 2019-12-13 | Stop reason: SDUPTHER

## 2018-10-05 RX ORDER — LEVOCETIRIZINE DIHYDROCHLORIDE 5 MG/1
5 TABLET, FILM COATED ORAL EVERY EVENING
Qty: 30 TABLET | Refills: 2 | Status: SHIPPED | OUTPATIENT
Start: 2018-10-05 | End: 2018-12-29 | Stop reason: SDUPTHER

## 2018-10-05 NOTE — PROGRESS NOTES
FAMILY PRACTICE OFFICE VISIT       NAME: Karissa Sylvester  AGE: 39 y o  SEX: female       : 1975        MRN: [id number]    DATE: 10/5/2018    Assessment and Plan     Problem List Items Addressed This Visit     Allergic rhinitis    Relevant Medications    fluticasone (FLONASE) 50 mcg/act nasal spray    levocetirizine (XYZAL) 5 MG tablet    Fatigue - Primary    Relevant Orders    Comprehensive metabolic panel (Completed)    CBC and differential (Completed)    Vitamin D 25 hydroxy (Completed)    TSH, 3rd generation (Completed)    Ferritin (Completed)    T4, free (Completed)    Chronic bilateral low back pain without sciatica      Other Visit Diagnoses     Menorrhagia with irregular cycle        Relevant Orders    CBC and differential (Completed)    Ferritin (Completed)    Vitamin D deficiency        Relevant Orders    Vitamin D 25 hydroxy (Completed)    Need for influenza vaccination        Relevant Orders    SYRINGE/SINGLE-DOSE VIAL: influenza vaccine, 2635-0320, quadrivalent, 0 5 mL, preservative-free, for patients 3+ yr (FLUZONE) (Completed)          1  Fatigue, unspecified type  Patient complains of fatigue  Likely multifactorial in origin, currently under a great deal stress living situation, chronic pain, current medications, and persistent allergy symptoms  Will check basic blood work as noted  Comprehensive metabolic panel    CBC and differential    Vitamin D 25 hydroxy    TSH, 3rd generation    Ferritin    T4, free   2  Allergic rhinitis due to pollen, unspecified seasonality  Allergic rhinitis, recommend Flonase nasal spray 2 sprays each nostril daily and levocetirizine 5 mg daily  Instructed to call if symptoms are not improving over the next 2 weeks  Also will call if symptoms worsen  fluticasone (FLONASE) 50 mcg/act nasal spray    levocetirizine (XYZAL) 5 MG tablet   3   Menorrhagia with irregular cycle  Will check CBC and ferritin level, due to menorrhagia with irregular cycle and fatigue  CBC and differential    Ferritin   4  Vitamin D deficiency  Vitamin D 25 hydroxy   5  Need for influenza vaccination  SYRINGE/SINGLE-DOSE VIAL: influenza vaccine, 3462-4245, quadrivalent, 0 5 mL, preservative-free, for patients 3+ yr (FLUZONE)   6  Chronic low back pain with out sciatica:  Reviewed recommendations from pain management  Reviewed recommendations  With patient, including importance of PT trial  She is agreeable to trying PT and will schedule  Recommend follow up with Dr Lana Pace in 3 months  Chief Complaint     Chief Complaint   Patient presents with    Follow-up    Sinusitis    Back Pain     Pt has c/o lower back pain        History of Present Illness     Leopold Carmel presents today for follow up visit today  Complains of fatigue and lack of energy  Napping every afternoon, which is uncommon for her  She likes to exercise, but sometimes feels too tired  She does have heavy, irregular menses  Following with gynecology  Most of the summer has had right ear discomfort, with ear feeling blocked and reduced hearing, nasal congestion, sinus pressure, and off balance  Chronic neck, shoulder, and back pain  Recent cortisone injections were helpful  She was recommended to go to PT, but is reluctant  Fearful of worsening pain  She is followed by pain management  Review of Systems   Review of Systems   Constitutional: Positive for fatigue  Negative for chills, diaphoresis and fever  HENT: Positive for congestion, ear pain, postnasal drip and sinus pressure  Negative for sore throat  Respiratory: Negative for cough, chest tightness, shortness of breath and wheezing  Cardiovascular: Negative for chest pain, palpitations and leg swelling  Gastrointestinal: Negative for nausea and vomiting  Musculoskeletal:        As noted in HPI   Neurological: Negative for dizziness, weakness, light-headedness and headaches         Active Problem List     Patient Active Problem List   Diagnosis    Acid reflux disease    Aftercare following surgery of the musculoskeletal system    Allergic rhinitis    Analgesic use    Avulsion fracture of middle phalanx of finger, closed, initial encounter    Bilateral hand pain    Cervical radiculopathy    Chronic cervical pain    Chronic cough    Chronic myofascial pain    Closed displaced fracture of proximal phalanx of left ring finger    Cold intolerance    Contracture of finger joint, left    Luetscher's syndrome    Eczema    Encounter for long-term opiate analgesic use    Esophageal reflux    Fatigue    Finger fracture, left    Finger pain    Gastroenteritis    Generalized anxiety disorder    Hearing loss    Impacted cerumen of right ear    Irritable bowel syndrome    Non-toxic uninodular goiter    Opioid dependence (HCC)    Pain in right shoulder    Pain syndrome, chronic    Painful orthopaedic hardware (HCC)    Paresthesias/numbness    Excessive thirst    Stiffness of finger joint of left hand    Suprascapular neuropathy    Disorder of bursae and tendons in shoulder region    Thyroid nodule    Xerostomia due to mouth breathing    Neck pain - Bilateral    Chronic bilateral low back pain without sciatica    Chronic pain syndrome - Bilateral    Myofascial pain syndrome - Bilateral    Chronic diarrhea    Cervicalgia    Myofascial pain    Chronic left shoulder pain       Past Medical History:  Past Medical History:   Diagnosis Date    Anxiety     Arthritis     Right shoulder    Chronic pain disorder     Bilateral shoulders    Depression     Fibroid May 14,2018    Removed in office    biopsy normal    Thyroid nodule     left side    Urinary tract infection     Since a child       Past Surgical History:  Past Surgical History:   Procedure Laterality Date    APPENDECTOMY      DENTAL SURGERY      Dental implants    ORIF FINGER FRACTURE Left 6/7/2016    Procedure: RING FINGER PROXIMAL PHALANX FRACTURE O/R I/F ;  Surgeon: Melissa Walker MD;  [facility];  Service:     MI OPEN TX PHALANGEAL SHAFT FRACTURE PROX/MIDDLE EA Left 10/12/2016    Procedure: RING FINGER MIDDLE PHALANX FRACTURE OPEN REDUCTION W/ INTERNAL FIXATION, AND CONTRACTURE RELEASE OF PIP JOINT ;  Surgeon: Melissa Walker MD;  [facility];  Service: Orthopedics    MI REMOVAL DEEP IMPLANT Left 10/26/2017    Procedure: [address] FINGER HARDWARE REMOVAL;  Surgeon: Rome Dumont MD;  Location[facility];  Service: Orthopedics    SHOULDER SURGERY      bialt    WISDOM TOOTH EXTRACTION Bilateral        Family History:  Family History   Problem Relation Age of Onset   Renu Ld Hypertension Mother     Asthma Mother     GI problems Mother     Stroke Mother     Hyperlipidemia Mother     Osteoarthritis Mother     Stroke Father     No Known Problems Brother     No Known Problems Brother     Breast cancer Maternal Aunt     Uterine cancer Maternal Aunt     Colon cancer Paternal Grandmother     Colon cancer Maternal Grandmother     Heart failure Maternal Grandmother     Colon cancer Maternal Grandfather     Diabetes Maternal Grandfather     Cervical cancer Maternal Aunt     Breast cancer Cousin         maternal side        Social History:  Social History     Social History    Marital status: Single     Spouse name: N/A    Number of children: N/A    Years of education: N/A     Occupational History    Not on file  Social History Main Topics    Smoking status: Former Smoker     Packs/day: 0 25     Years: 5 00     Types: Cigarettes     Quit date: 1/8/2015    Smokeless tobacco: Never Used      Comment: Social use    Alcohol use No    Drug use: No    Sexual activity: No     Other Topics Concern    Not on file     Social History Narrative    No narrative on file       I have reviewed the patient's medical history in detail; there are no changes to the history as noted in the electronic medical record      Objective Vitals:    10/05/18 0824   BP: 90/64   BP Location: Left arm   Patient Position: Sitting   Cuff Size: Adult   Pulse: 56   Resp: 16   Temp: (!) 97 4 °F (36 3 °C)   TempSrc: Tympanic   Weight: 48 6 kg (107 lb 3 2 oz)   Height: 5' 2 6" (1 59 m)     Wt Readings from Last 3 Encounters:   10/05/18 48 6 kg (107 lb 3 2 oz)   09/20/18 49 4 kg (108 lb 12 8 oz)   09/13/18 47 8 kg (105 lb 6 4 oz)     Body mass index is 19 23 kg/m²  PHQ-9 Depression Screening    PHQ-9:    Frequency of the following problems over the past two weeks:            Physical Exam   Constitutional: She is oriented to person, place, and time  She appears well-developed and well-nourished  No distress  HENT:   Head: Normocephalic and atraumatic  Right Ear: Tympanic membrane and ear canal normal    Left Ear: Tympanic membrane and ear canal normal    Nose: Mucosal edema (Nasal mucosa pale and boggy) present  Mouth/Throat: Oropharynx is clear and moist    Eyes: Pupils are equal, round, and reactive to light  Conjunctivae are normal    Neck: Normal range of motion  Neck supple  No thyromegaly present  Cardiovascular: Normal rate and regular rhythm  No murmur heard  Pulmonary/Chest: Effort normal and breath sounds normal    Abdominal: Soft  Bowel sounds are normal    Musculoskeletal: Normal range of motion  Lymphadenopathy:     She has no cervical adenopathy  Neurological: She is alert and oriented to person, place, and time  Skin: No rash noted  Psychiatric: She has a normal mood and affect  Nursing note and vitals reviewed        ALLERGIES:  Allergies   Allergen Reactions    Naproxen GI Intolerance    Vancomycin Hives and Itching    Hydrocodone-Acetaminophen Hives     judie tylenol/past    Nsaids Other (See Comments)     ultram and naprosen       Current Medications     Current Outpatient Prescriptions   Medication Sig Dispense Refill    BUTRANS 10 MCG/HR PTWK Place 1 patch on the skin once a week 4 patch 2    clonazePAM (KlonoPIN) 1 mg tablet Take 1 mg by mouth 4 (four) times a day as needed      esomeprazole (NexIUM) 40 MG capsule Take 40 mg by mouth daily  5    famotidine (PEPCID) 20 mg tablet TAKE 1 TABLET (20 MG TOTAL) BY MOUTH DAILY AT BEDTIME 30 tablet 3    fluticasone (FLONASE) 50 mcg/act nasal spray 2 sprays into each nostril daily 3 Bottle 0    pregabalin (LYRICA) 75 mg capsule Take 1 capsule (75 mg total) by mouth 3 (three) times a day 90 capsule 1    tiZANidine (ZANAFLEX) 2 mg tablet Take 1-2 PO TID PRN 90 tablet 2    triamcinolone (KENALOG) 0 5 % cream Apply 30 application topically 3 (three) times a day      venlafaxine (EFFEXOR XR) 75 mg 24 hr capsule Take 1 capsule by mouth daily      zolpidem (AMBIEN) 10 mg tablet Take 10 mg by mouth daily at bedtime as needed for sleep      levocetirizine (XYZAL) 5 MG tablet Take 1 tablet (5 mg total) by mouth every evening 30 tablet 2     No current facility-administered medications for this visit            Health Maintenance     Health Maintenance   Topic Date Due    SLP PLAN OF CARE  1975    Depression Screening PHQ  09/20/2019    DTaP,Tdap,and Td Vaccines (2 - Td) 05/04/2028    INFLUENZA VACCINE  Completed     Immunization History   Administered Date(s) Administered    Influenza 10/23/2007    Influenza, injectable, quadrivalent, preservative free 0 5 mL 10/05/2018    Td (adult), Unspecified 05/28/2003    Tdap 05/04/2018       CHRIS Hooks

## 2018-10-08 ENCOUNTER — TELEPHONE (OUTPATIENT)
Dept: FAMILY MEDICINE CLINIC | Facility: CLINIC | Age: 43
End: 2018-10-08

## 2018-10-08 NOTE — PROGRESS NOTES
Please contact patient regarding blood work results  All blood work is good  Vitamin D level is a little bit low at 26 2, Recommend starting OTC vitamin D3 supplement 2000 units daily  Ferritin level, which shows your iron stores, is low normal  Recommend eating an iron rich diet, or taking OTC ferrous sulfate 325 mg daily  Be aware iron supplements can cause upset stomach and constipation  Take iron supplement at least 1 hour before taking Pepcid  Both low vitamin D level and low iron level can contribute to feeling fatigued  Please have her call with questions  Follow up with Dr Alysia Camacho in 3 months

## 2018-10-08 NOTE — TELEPHONE ENCOUNTER
----- Message from 80Sepideh Romero sent at 10/8/2018  1:55 PM EDT -----  Please contact patient regarding blood work results  All blood work is good  Vitamin D level is a little bit low at 26 2, Recommend starting OTC vitamin D3 supplement 2000 units daily  Ferritin level, which shows your iron stores, is low normal  Recommend eating an iron rich diet, or taking OTC ferrous sulfate 325 mg daily  Be aware iron supplements can cause upset stomach and constipation  Take iron supplement at least 1 hour before taking Pepcid  Both low vitamin D level and low iron level can contribute to feeling fatigued  Please have her call with questions  Follow up with Dr Rupal Jackson in 3 months

## 2018-10-11 ENCOUNTER — EVALUATION (OUTPATIENT)
Dept: PHYSICAL THERAPY | Age: 43
End: 2018-10-11
Attending: ORTHOPAEDIC SURGERY
Payer: MEDICARE

## 2018-10-11 DIAGNOSIS — M79.18 MYOFASCIAL PAIN: ICD-10-CM

## 2018-10-11 DIAGNOSIS — M54.2 CERVICALGIA: ICD-10-CM

## 2018-10-11 DIAGNOSIS — M54.42 ACUTE BILATERAL LOW BACK PAIN WITH BILATERAL SCIATICA: Primary | ICD-10-CM

## 2018-10-11 DIAGNOSIS — M54.41 ACUTE BILATERAL LOW BACK PAIN WITH BILATERAL SCIATICA: Primary | ICD-10-CM

## 2018-10-11 PROCEDURE — 97162 PT EVAL MOD COMPLEX 30 MIN: CPT | Performed by: PHYSICAL THERAPIST

## 2018-10-11 PROCEDURE — G8978 MOBILITY CURRENT STATUS: HCPCS | Performed by: PHYSICAL THERAPIST

## 2018-10-11 PROCEDURE — 97110 THERAPEUTIC EXERCISES: CPT | Performed by: PHYSICAL THERAPIST

## 2018-10-11 PROCEDURE — G8979 MOBILITY GOAL STATUS: HCPCS | Performed by: PHYSICAL THERAPIST

## 2018-10-11 NOTE — PROGRESS NOTES
PT Evaluation     Today's date: 10/11/2018  Patient name: Reno Narayanan  : 1975  MRN: [id number]  Referring provider: Suhas Esparza DO  Dx:   Encounter Diagnosis     ICD-10-CM    1  Acute bilateral low back pain with bilateral sciatica M54 42 Ambulatory referral to Physical Therapy    M54 41    2  Cervicalgia M54 2    3  Myofascial pain M79 18                   Assessment  Impairments: abnormal gait, abnormal or restricted ROM, activity intolerance, impaired balance, impaired physical strength, lacks appropriate home exercise program and pain with function    Assessment details: Patient seen for PT evaluation for myofascial pain, acute low back pain  Patient presents with pain limiting all movements- lumbar spine and LEs, appears to have muscle tightness  PT initiated HEP for gentle stretching and strengthening  Talked with patient at length about history of pain; has never had lumbar spine pain in her past  PT offered suggestion of aqua therapy for low back pain, patient preferring to wait to see how land therapy progresses; still wanting a MRI for lumbar spine  PT unable to get patient into position for special testing (ie Slhaim, MARTIN) to rule out HNP vs SI dysfunction  Moves and responds like myofascial disorder  PT will treat as patient is able to tolerate therapy  Understanding of Dx/Px/POC: good   Prognosis: good    Goals  Impairment Goals to be met within 4 weeks  - Decrease pain to 5/10 at rest, 6/10 with activity  - Improve ROM by 5-10 degrees grossly throughout  - Increase strength to 5/5 throughout B LEs       Functional Goals to be met within 4-6 weeks  - Return to Prior Level of Function  - Increase Functional Status Measure to: expected  - Patient will be independent with HEP  - Patient to be able to tolerate walking her dogs         Plan  Patient would benefit from: skilled physical therapy  Planned modality interventions: cryotherapy, thermotherapy: hydrocollator packs and hydrotherapy  Planned therapy interventions: abdominal trunk stabilization, manual therapy, neuromuscular re-education, patient education, postural training, strengthening, stretching, therapeutic activities, therapeutic exercise, home exercise program, gait training, balance and aquatic therapy  Frequency: 2x week  Duration in weeks: 4  Treatment plan discussed with: patient        Subjective Evaluation    History of Present Illness  Date of onset: 2018  Mechanism of injury: Patient with chronic pain disorder x 20 years  Patient has been trying to stay active, feels that she's pushing herself with walking, and may have increased her back pain  Patient reports that her back pain has been so bad that she can't get out of bed d/t back pain, stiffness  Patient reports pain started ~ 20 days ago  Patient followed up with PCP, was recommended muscle relaxor medication for pain- takes mostly at night, not during the day as this medication makes her sleepy  Patient follows up with pain management regularly  Patient has to do PT first before MRI  Patient has had injections for neck/shoulder pain (h/o 4 rotator cuff surgeries); has not had any injections for her lumbar spine  Pain  Current pain ratin  At best pain ratin  At worst pain rating: 10  Location: low back  Quality: pressure, throbbing and radiating  Aggravating factors: sitting, standing, walking, stair climbing, lifting and overhead activity  Progression: worsening    Social Support  Steps to enter house: yes  30  Stairs in house: no   Lives in: apartment  Lives with: parents    Employment status: not working  Exercise history: Patient is an avid exerciser, usually runs/jogs daily despite pain; however, hasn't been able to tolerate any exercising      Treatments  No previous or current treatments  Patient Goals  Patient goals for therapy: decreased pain, improved balance, increased motion, increased strength and return to sport/leisure activities          Objective     Neurological Testing     Reflexes   Left   Clonus sign: negative    Right   Clonus sign: negative    Active Range of Motion     Lumbar   Flexion: 45 degrees with pain  Extension: 3 degrees with pain  Left lateral flexion: 8 degrees with pain  Right lateral flexion: 13 degrees with pain  Left rotation: 8 degrees   Right rotation: 8 degrees   Left Hip   Flexion: 55 degrees with pain  Abduction: 20 degrees with pain    Right Hip   Flexion: 65 degrees with pain  Abduction: 25 degrees with pain  Left Knee   Flexion: 125 degrees with pain  Extension: 0 degrees with pain    Right Knee   Flexion: 125 degrees with pain  Extension: 0 degrees with pain    Additional Active Range of Motion Details  Patient reports pain into posterior thighs down into ankles and feet, plantar aspect of feet  Passive Range of Motion   Left Hip   Flexion: 90 degrees with pain  Abduction: 30 degrees with pain    Right Hip   Flexion: 90 degrees with pain  Abduction: 30 degrees with pain    Strength/Myotome Testing     Left Hip   Planes of Motion   Flexion: 3  Abduction: 3+  Adduction: 3+    Right Hip   Planes of Motion   Flexion: 3  Abduction: 3+  Adduction: 3+    Ambulation     Observational Gait   Gait: antalgic   Decreased walking speed, stride length, left stance time, right stance time, left swing time, right swing time, left step length and right step length         Flowsheet Rows      Most Recent Value   PT/OT G-Codes   Current Score  10   Projected Score  41          Precautions myofascial pain, chronic cervical and shoulder pain    Specialty Daily Treatment Diary       Manual 10/11                               Exercise Diary                 LTR 5x:05       90/90 ham stretch, strap 5x:05 ea       SKTC with strap 5x:05 ea               bridges 5x               Sup DLS iso abd        Sup alt arms        Sup alt hip flexion        Sup alt arms/legs                SLR flexion        Standing hip abd Modalities        MH

## 2018-10-12 ENCOUNTER — OFFICE VISIT (OUTPATIENT)
Dept: PHYSICAL THERAPY | Age: 43
End: 2018-10-12
Attending: ORTHOPAEDIC SURGERY
Payer: MEDICARE

## 2018-10-12 DIAGNOSIS — M54.2 CERVICALGIA: ICD-10-CM

## 2018-10-12 DIAGNOSIS — M54.42 ACUTE BILATERAL LOW BACK PAIN WITH BILATERAL SCIATICA: Primary | ICD-10-CM

## 2018-10-12 DIAGNOSIS — M79.18 MYOFASCIAL PAIN: ICD-10-CM

## 2018-10-12 DIAGNOSIS — M54.41 ACUTE BILATERAL LOW BACK PAIN WITH BILATERAL SCIATICA: Primary | ICD-10-CM

## 2018-10-12 PROCEDURE — 97112 NEUROMUSCULAR REEDUCATION: CPT | Performed by: PHYSICAL THERAPIST

## 2018-10-12 PROCEDURE — 97010 HOT OR COLD PACKS THERAPY: CPT | Performed by: PHYSICAL THERAPIST

## 2018-10-12 PROCEDURE — 97110 THERAPEUTIC EXERCISES: CPT | Performed by: PHYSICAL THERAPIST

## 2018-10-12 NOTE — PROGRESS NOTES
Daily Note     Today's date: 10/12/2018  Patient name: Kim Reilly  : 1975  MRN: [id number]  Referring provider: Hazel Kelly DO  Dx:   Encounter Diagnosis     ICD-10-CM    1  Acute bilateral low back pain with bilateral sciatica M54 42     M54 41    2  Cervicalgia M54 2    3  Myofascial pain M79 18                   Subjective: Patient reports no worse from exercises yesterday, no immediate relief of pain with kinesiotape from IE  Patient still reports pain 9/10 today  Thinks that she may have aggravated her back when she was carrying her dog up/down the steps (dog weighs ~ 100 lbs)  Objective: See treatment diary below    Precautions myofascial pain, chronic cervical and shoulder pain    Specialty Daily Treatment Diary       Manual 10/11 10/12                              Exercise Diary                 LTR 5x:05 5x:05      90/90 ham stretch, strap 5x:05 ea 5x:05      SKTC with strap 5x:05 ea 5x:05              bridges 5x 5x              Sup DLS iso abd  46B :05      Sup alt arms  10x      Sup alt hip flexion  10x      Sup alt arms/legs  10x              SLR flexion        Standing hip abd                                                        Modalities        MH   10' seated                              Assessment: Initiated treatment plan as indicated  Patient able to tolerate session well despite current level of pain  Talked with patient about mechanism of injury, definitely possible cause of such back pain  Recommended to heat and gently stretch at home as able  Plan: Continue per plan of care

## 2018-10-15 ENCOUNTER — APPOINTMENT (OUTPATIENT)
Dept: PHYSICAL THERAPY | Age: 43
End: 2018-10-15
Attending: ORTHOPAEDIC SURGERY
Payer: MEDICARE

## 2018-10-18 ENCOUNTER — TELEPHONE (OUTPATIENT)
Dept: PAIN MEDICINE | Facility: CLINIC | Age: 43
End: 2018-10-18

## 2018-10-18 NOTE — TELEPHONE ENCOUNTER
Patient is calling because she was told to call the office and ask for a script for an MRI  Her physical therapist thinks it is necessary  She is asking if this is okay? She can be reached at #[phone]

## 2018-10-18 NOTE — TELEPHONE ENCOUNTER
Pt said her PT is telling the pt to ask for a rx for an MRI of her lumbar spine  Looks like she has only completed two sessions

## 2018-10-19 ENCOUNTER — APPOINTMENT (OUTPATIENT)
Dept: PHYSICAL THERAPY | Age: 43
End: 2018-10-19
Attending: ORTHOPAEDIC SURGERY
Payer: MEDICARE

## 2018-10-19 NOTE — TELEPHONE ENCOUNTER
Have the patient continue in PT and we will reevaluate necessity of MRI at next office visit in November

## 2018-10-19 NOTE — TELEPHONE ENCOUNTER
Received a TC from the patient transferred from the call room and reviewed the previous task  She stated her pain continues in her LB and radiates down her BLE's, but mostly it affects her LLE > RLE  She has been having a hard time getting out of her bed and has had to put her mattress on the floor because she has been so uncomfortable

## 2018-10-19 NOTE — TELEPHONE ENCOUNTER
Caller- Physical Therapist, Rafi Jin  - [phone]  Caller reports she did not recommend an MRI to the patient  She said Patient was responding well to therapy  She says it is up to Dr aMna Hanson to decide

## 2018-10-19 NOTE — TELEPHONE ENCOUNTER
Attempted to call the patient and left a detailed mom in regards to the previous task  Pt  To Cb if any questions

## 2018-10-22 ENCOUNTER — OFFICE VISIT (OUTPATIENT)
Dept: PHYSICAL THERAPY | Age: 43
End: 2018-10-22
Attending: ORTHOPAEDIC SURGERY
Payer: MEDICARE

## 2018-10-22 DIAGNOSIS — M79.18 MYOFASCIAL PAIN: ICD-10-CM

## 2018-10-22 DIAGNOSIS — M54.2 CERVICALGIA: ICD-10-CM

## 2018-10-22 DIAGNOSIS — M54.42 ACUTE BILATERAL LOW BACK PAIN WITH BILATERAL SCIATICA: Primary | ICD-10-CM

## 2018-10-22 DIAGNOSIS — M54.41 ACUTE BILATERAL LOW BACK PAIN WITH BILATERAL SCIATICA: Primary | ICD-10-CM

## 2018-10-22 PROCEDURE — 97110 THERAPEUTIC EXERCISES: CPT | Performed by: PHYSICAL THERAPIST

## 2018-10-22 PROCEDURE — 97010 HOT OR COLD PACKS THERAPY: CPT | Performed by: PHYSICAL THERAPIST

## 2018-10-22 NOTE — PROGRESS NOTES
Daily Note     Today's date: 10/22/2018  Patient name: Cat Elaine  : 1975  MRN: [id number]  Referring provider: Chintan Kellogg DO  Dx:   Encounter Diagnosis     ICD-10-CM    1  Acute bilateral low back pain with bilateral sciatica M54 42     M54 41    2  Cervicalgia M54 2    3  Myofascial pain M79 18                   Subjective: Patient upset with PT about getting MRI  Patient felt that PT recommended MRI for back pain; PT talked with patient about therapy goals, gentle stretching at home and using heat throughout the day to address muscle tightness/pain  Patient feels heat helps her, but doesn't last  Patient continues to report 9/10 pain today  Objective: See treatment diary below    Precautions myofascial pain, chronic cervical and shoulder pain    Specialty Daily Treatment Diary       Manual 10/11 10/12 10/22                             Exercise Diary                 LTR 5x:05 5x:05 5x :05     90/90 ham stretch, strap 5x:05 ea 5x:05 5x :05     SKTC with strap 5x:05 ea 5x:05 5x:05             bridges 5x 5x 5x             Sup DLS iso abd  80B :05 10x :05     Sup alt arms  10x 10x     Sup alt hip flexion  10x 10x     Sup alt arms/legs  10x NT             SLR flexion        Standing hip abd                                                        Modalities        MH   10' seated 10' seated                             Assessment: Continued with current program  Finished with heat for muscle relaxation post exercising  Talked with patient about MRI- suggested patient to discuss MRI with MD  PT told patient that she tolerated therapy well last session despite pain; patient upset that she can't have a MRI despite her 9/10 pain today  Plan: Continue per plan of care  Plan to give pictures of core exercises and bridges NV for HEP

## 2018-10-26 ENCOUNTER — APPOINTMENT (OUTPATIENT)
Dept: PHYSICAL THERAPY | Age: 43
End: 2018-10-26
Attending: ORTHOPAEDIC SURGERY
Payer: MEDICARE

## 2018-10-29 ENCOUNTER — APPOINTMENT (OUTPATIENT)
Dept: PHYSICAL THERAPY | Age: 43
End: 2018-10-29
Attending: ORTHOPAEDIC SURGERY
Payer: MEDICARE

## 2018-10-30 ENCOUNTER — APPOINTMENT (OUTPATIENT)
Dept: PHYSICAL THERAPY | Age: 43
End: 2018-10-30
Attending: ORTHOPAEDIC SURGERY
Payer: MEDICARE

## 2018-11-08 ENCOUNTER — OFFICE VISIT (OUTPATIENT)
Dept: PAIN MEDICINE | Facility: CLINIC | Age: 43
End: 2018-11-08
Payer: MEDICARE

## 2018-11-08 ENCOUNTER — DOCUMENTATION (OUTPATIENT)
Dept: PAIN MEDICINE | Facility: CLINIC | Age: 43
End: 2018-11-08

## 2018-11-08 ENCOUNTER — TELEPHONE (OUTPATIENT)
Dept: PAIN MEDICINE | Facility: CLINIC | Age: 43
End: 2018-11-08

## 2018-11-08 VITALS
BODY MASS INDEX: 19.84 KG/M2 | HEART RATE: 81 BPM | WEIGHT: 112 LBS | HEIGHT: 63 IN | DIASTOLIC BLOOD PRESSURE: 76 MMHG | SYSTOLIC BLOOD PRESSURE: 114 MMHG

## 2018-11-08 DIAGNOSIS — G89.29 CHRONIC MYOFASCIAL PAIN: ICD-10-CM

## 2018-11-08 DIAGNOSIS — M46.1 SACROILIITIS (HCC): ICD-10-CM

## 2018-11-08 DIAGNOSIS — M79.18 MYOFASCIAL PAIN: ICD-10-CM

## 2018-11-08 DIAGNOSIS — G89.4 PAIN SYNDROME, CHRONIC: ICD-10-CM

## 2018-11-08 DIAGNOSIS — M54.16 LUMBAR RADICULOPATHY: Primary | ICD-10-CM

## 2018-11-08 DIAGNOSIS — F11.20 UNCOMPLICATED OPIOID DEPENDENCE (HCC): ICD-10-CM

## 2018-11-08 DIAGNOSIS — M79.18 MYOFASCIAL PAIN SYNDROME: ICD-10-CM

## 2018-11-08 DIAGNOSIS — M79.18 CHRONIC MYOFASCIAL PAIN: ICD-10-CM

## 2018-11-08 DIAGNOSIS — IMO0001 PARESTHESIAS/NUMBNESS: ICD-10-CM

## 2018-11-08 PROCEDURE — 80305 DRUG TEST PRSMV DIR OPT OBS: CPT | Performed by: NURSE PRACTITIONER

## 2018-11-08 PROCEDURE — 99214 OFFICE O/P EST MOD 30 MIN: CPT | Performed by: NURSE PRACTITIONER

## 2018-11-08 RX ORDER — BACLOFEN 10 MG/1
TABLET ORAL
COMMUNITY
End: 2019-04-23

## 2018-11-08 RX ORDER — BUPRENORPHINE 10 UG/H
1 PATCH, EXTENDED RELEASE TRANSDERMAL WEEKLY
Qty: 4 PATCH | Refills: 2 | Status: SHIPPED | OUTPATIENT
Start: 2018-11-08 | End: 2019-04-23

## 2018-11-08 RX ORDER — TIZANIDINE 2 MG/1
TABLET ORAL
Qty: 90 TABLET | Refills: 2 | Status: SHIPPED | OUTPATIENT
Start: 2018-11-08

## 2018-11-08 RX ORDER — BUPRENORPHINE 10 UG/H
1 PATCH, EXTENDED RELEASE TRANSDERMAL WEEKLY
Qty: 4 PATCH | Refills: 0 | Status: CANCELLED | OUTPATIENT
Start: 2018-11-08

## 2018-11-08 RX ORDER — PREGABALIN 75 MG/1
CAPSULE ORAL
Qty: 120 CAPSULE | Refills: 2 | Status: SHIPPED | OUTPATIENT
Start: 2018-11-08 | End: 2019-04-23

## 2018-11-08 RX ORDER — DIPHENOXYLATE HYDROCHLORIDE AND ATROPINE SULFATE 2.5; .025 MG/1; MG/1
1 TABLET ORAL DAILY
COMMUNITY

## 2018-11-08 RX ORDER — PREGABALIN 75 MG/1
CAPSULE ORAL
Qty: 120 CAPSULE | Refills: 2 | Status: CANCELLED | OUTPATIENT
Start: 2018-11-08

## 2018-11-08 NOTE — PROGRESS NOTES
Assessment:  1  Lumbar radiculopathy    2  Myofascial pain    3  Pain syndrome, chronic    4  Paresthesias/numbness    5  Myofascial pain syndrome - Bilateral    6  Chronic myofascial pain    7  Uncomplicated opioid dependence ([address] )    8  Sacroiliitis ([address] )        Plan:  1  I will order an MRI of the lumbar spine  Patient's bilateral lower extremity radicular symptoms in an S1 dermatomal distribution persist and are worsening despite use of Lyrica and physical therapy  We will call the patient with results  2   Will increase the patient's Lyrica to 75 mg in the morning and afternoon and 150 mg at bedtime for neuropathic complaints  I advised the patient that if they experience any side effects or issues with the changes in their medication regiment, they should give our office a call to discuss  I also advised the patient not to drive or operate machinery until they see how the changes in the medication regimen affects them  The patient was agreeable and verbalized an understanding  3   Based on patient report and physical exam, the patient's symptomatology does seem to be consistent with sacroiliac mediated pain from sacroiliitis  We will schedule the patient for a bilateral SIJ injection to decrease any inflammatory component of the patient's pain symptoms  Complete risks and benefits including bleeding, infection, tissue reaction, nerve injury and allergic reaction were discussed  The approach was demonstrated using models and literature was provided  Verbal consent was obtained  4   The patient may continue on Butrans 10mcg/hr 1 patch TD weekly for her chronic pain complaints  This medication was refilled at today's office visit with 2 refills  [place] Prescription Drug Monitoring Program report was reviewed and was appropriate     A urine drug screen was collected at today's office visit as part of our medication management protocol   The point of care testing results were appropriate for what was being prescribed  The specimen will be sent for confirmatory testing  The drug screen is medically necessary because the patient is either dependent on opioid medication or is being considered for opioid medication therapy and the results could impact ongoing or future treatment  The drug screen is to evaluate for the presences or absence of prescribed, non-prescribed, and/or illicit drugs/substances  There are risks associated with opioid medications, including dependence, addiction and tolerance  The patient understands and agrees to use these medications only as prescribed  Potential side effects of the medications include, but are not limited to, constipation, drowsiness, addiction, impaired judgment and risk of fatal overdose if not taken as prescribed  The patient was warned against driving while taking sedation medications  Sharing medications is a felony  At this point in time, the patient is showing no signs of addiction, abuse, diversion or suicidal ideation  5   I offered to repeat trigger point injections in the patient's cervical paraspinal and trapezii musculature however she declines at this time  6    Patient may continue tizanidine 2 mg 1-2 tablets t i d  p r n  myofascial pain  This medication was refilled at today's office visit  7   The patient will continue with her home exercise program  8  The patient will follow-up in 3 months for medication prescription refill and reevaluation  The patient was advised to contact the office should their symptoms worsen in the interim  The patient was agreeable and verbalized an understanding    History of Present Illness: The patient is a 39 y o  female last seen on 9/13/18 who presents for a follow up office visit in regards to chronic low back pain and neck pain secondary to chronic myofascial pain, cervical spondylosis, sacroiliitis, and chronic pain syndrome        The patient currently reports that since our last office visit, her low back pain and lower extremity symptoms have worsened, despite the use of Lyrica and physical therapy dedicated to her low back pain  She complains of bilateral low back pain, left greater than right, with radicular symptoms in an S1 dermatomal distribution of the bilateral lower extremities  She denies any bowel or bladder incontinence or saddle anesthesia  She does not have any imaging of her low back  Patient also complains of neck pain that radiates into the bilateral trapezii  She last received trigger point injections in July of 2018 and states that she feels she still obtaining relief from this procedure today  She denies any radicular symptoms into the bilateral upper extremities or balance issues  Patient currently rates her pain a 9/10 on the numeric pain rating scale  She states her pain is constant in nature and bothersome throughout the entirety of the day  She characterizes the pain as burning, sharp, throbbing, cramping, pressure like, shooting, numbness and pins and needles  Current pain medications includes:  Butrans 10mcg/hr 1 patch TD weekly, Lyrica 75 mg t i d  and tizanidine 2 mg 1 tablet t i d  p r n  pain    The patient reports that this regimen is providing moderate pain relief  The patient is reporting sleepiness from this pain medication regimen  Pain Contract Signed: 11/8/18  Last Urine Drug Screen: 11/8/18  BUTRANS 11/7/18 - patch visualized    I have personally reviewed and/or updated the patient's past medical history, past surgical history, family history, social history, current medications, allergies, and vital signs today  Review of Systems:    Review of Systems   Respiratory: Negative for shortness of breath  Cardiovascular: Negative for chest pain  Gastrointestinal: Negative for constipation, diarrhea, nausea and vomiting  Musculoskeletal: Positive for gait problem (Difficulty walking, decreased range of motion)   Negative for arthralgias, joint swelling and myalgias  Skin: Negative for rash  Neurological: Negative for dizziness, seizures and weakness  All other systems reviewed and are negative  Past Medical History:   Diagnosis Date    Anxiety     Arthritis     Right shoulder    Chronic pain disorder     Bilateral shoulders    Depression     Fibroid May 14,2018    Removed in office    biopsy normal    Thyroid nodule     left side    Urinary tract infection     Since a child       Past Surgical History:   Procedure Laterality Date    APPENDECTOMY      DENTAL SURGERY      Dental implants    ORIF FINGER FRACTURE Left 6/7/2016    Procedure: RING FINGER PROXIMAL PHALANX FRACTURE O/R I/F ;  Surgeon: Cole Winston MD;  [facility];  Service:     NE OPEN TX PHALANGEAL SHAFT FRACTURE PROX/MIDDLE EA Left 10/12/2016    Procedure: RING FINGER MIDDLE PHALANX FRACTURE OPEN REDUCTION W/ INTERNAL FIXATION, AND CONTRACTURE RELEASE OF PIP JOINT ;  Surgeon: Cole Winston MD;  [facility];  Service: Orthopedics    NE REMOVAL DEEP IMPLANT Left 10/26/2017    Procedure: [address] FINGER HARDWARE REMOVAL;  Surgeon: Leila Braxton MD;  [facility];  Service: Orthopedics    SHOULDER SURGERY      bialt    WISDOM TOOTH EXTRACTION Bilateral        Family History   Problem Relation Age of Onset   [employer] Hypertension Mother     Asthma Mother     GI problems Mother     Stroke Mother     Hyperlipidemia Mother     Osteoarthritis Mother     Stroke Father     No Known Problems Brother     No Known Problems Brother     Breast cancer Maternal Aunt     Uterine cancer Maternal Aunt     Colon cancer Paternal Grandmother     Colon cancer Maternal Grandmother     Heart failure Maternal Grandmother     Colon cancer Maternal Grandfather     Diabetes Maternal Grandfather     Cervical cancer Maternal Aunt     Breast cancer Cousin         maternal side        Social History     Occupational History    Not on file       Social History Main Topics    Smoking status: Former Smoker     Packs/day: 0 25     Years: 5 00     Types: Cigarettes     Quit date: 1/8/2015    Smokeless tobacco: Never Used      Comment: Social use    Alcohol use No    Drug use: No    Sexual activity: No         Current Outpatient Prescriptions:     baclofen 10 mg tablet, baclofen 10 mg tablet, Disp: , Rfl:     BUTRANS 10 MCG/HR PTWK, Place 1 patch on the skin once a week, Disp: 4 patch, Rfl: 2    cholecalciferol (VITAMIN D3) 1,000 units tablet, Take 1,000 Units by mouth daily, Disp: , Rfl:     clonazePAM (KlonoPIN) 1 mg tablet, Take 1 mg by mouth 4 (four) times a day as needed, Disp: , Rfl:     esomeprazole (NexIUM) 40 MG capsule, Take 40 mg by mouth daily, Disp: , Rfl: 5    famotidine (PEPCID) 20 mg tablet, TAKE 1 TABLET (20 MG TOTAL) BY MOUTH DAILY AT BEDTIME, Disp: 30 tablet, Rfl: 3    fluticasone (FLONASE) 50 mcg/act nasal spray, 2 sprays into each nostril daily, Disp: 3 Bottle, Rfl: 0    IRON PO, Take by mouth daily, Disp: , Rfl:     levocetirizine (XYZAL) 5 MG tablet, Take 1 tablet (5 mg total) by mouth every evening, Disp: 30 tablet, Rfl: 2    multivitamin (THERAGRAN) TABS, Take 1 tablet by mouth daily, Disp: , Rfl:     pregabalin (LYRICA) 75 mg capsule, Take 1 capsule (75 mg total) by mouth 3 (three) times a day, Disp: 90 capsule, Rfl: 1    tiZANidine (ZANAFLEX) 2 mg tablet, Take 1-2 PO TID PRN, Disp: 90 tablet, Rfl: 2    triamcinolone (KENALOG) 0 5 % cream, Apply 30 application topically 3 (three) times a day, Disp: , Rfl:     venlafaxine (EFFEXOR XR) 75 mg 24 hr capsule, Take 1 capsule by mouth daily, Disp: , Rfl:     zolpidem (AMBIEN) 10 mg tablet, Take 10 mg by mouth daily at bedtime as needed for sleep, Disp: , Rfl:     Allergies   Allergen Reactions    Naproxen GI Intolerance and Nausea Only    Vancomycin Hives and Itching    Hydrocodone-Acetaminophen Hives     judie tylenol/past    Nsaids Other (See Comments)     ultram and naprosen Physical Exam:    /76   Pulse 81   Ht 5' 2 6" (1 59 m)   Wt 50 8 kg (112 lb)   BMI 20 09 kg/m²     Constitutional:normal, well developed, well nourished, alert, in no distress and non-toxic and no overt pain behavior  Eyes:anicteric  HEENT:grossly intact  Neck:supple, symmetric, trachea midline and no masses   Pulmonary:even and unlabored  Cardiovascular:No edema or pitting edema present  Skin:Normal without rashes or lesions and well hydrated  Psychiatric:Mood and affect appropriate  Neurologic:Cranial Nerves II-XII grossly intact  Musculoskeletal:Slightly antalgic gait but steady without the use of assistive devices  Bilateral lumbar paraspinal musculature tender to palpation, left greater than right  Bilateral SI joints tender to palpation, left greater than right  Bilateral lower extremity strength 5/5 in all muscle groups  Positive straight leg raise bilaterally, left greater than right  Positive Jose R's, AP compression, and Gaenslen's test bilaterally, left greater than right  Bilateral cervical paraspinal and trapezii musculature mildly tender to palpation  Bilateral upper extremity strength 5/5 in all muscle groups  Negative Spurling's bilaterally  Imaging  MRI lumbar spine without contrast    (Results Pending)   FL spine and pain procedure    (Results Pending)     MRI CERVICAL SPINE WITHOUT CONTRAST     INDICATION:  Chronic right-sided neck and shoulder pain  Numbness and paresthesias of the right arm and hand      COMPARISON:  10/7/2008      TECHNIQUE:  Sagittal T1, sagittal T2, sagittal inversion recovery, axial T2, axial  gradient          IMAGE QUALITY:  Diagnostic     FINDINGS:     ALIGNMENT:  Normal alignment of the cervical spine  No compression fracture  No subluxation    No scoliosis      MARROW SIGNAL:  Type I endplate marrow edema noted within the C6-7 endplates, degenerative in nature      CERVICAL AND VISUALIZED THORACIC CORD:  Normal signal within the visualized cord      PREVERTEBRAL AND PARASPINAL SOFT TISSUES:  Prevertebral and paraspinal soft tissues are unremarkable      VISUALIZED POSTERIOR FOSSA:  The visualized posterior fossa demonstrates no abnormal signal      CERVICAL DISC SPACES:          C2-C3:  Normal      C3-C4:  Normal      C4-C5:  Slight loss of disc height  Previously seen No disc herniation, canal stenosis or foraminal narrowing  Slight improvement compared to the prior exam      C5-C6:  No disc herniation, canal stenosis or foraminal narrowing      C6-C7:  Disc desiccation with slight annular bulging  No focal disc herniation or canal stenosis  No foraminal narrowing      C7-T1:  Normal      UPPER THORACIC DISC SPACES:  Normal      IMPRESSION:     Mild cervical degenerative disc disease    No disc herniation, canal stenosis or foraminal narrowing           Orders Placed This Encounter   Procedures    MRI lumbar spine without contrast    FL spine and pain procedure

## 2018-11-08 NOTE — TELEPHONE ENCOUNTER
Can you please draft a letter and send to the patient's house stating:    "Ruben Sylvester is wearing a Butrans 10mcg/hr patch on her lower abdomen that must remain on at all times  Please make accommodations accordingly "    Thank you

## 2018-11-14 DIAGNOSIS — M79.18 MYOFASCIAL PAIN: ICD-10-CM

## 2018-11-14 RX ORDER — TIZANIDINE 2 MG/1
2 TABLET ORAL EVERY 8 HOURS PRN
Qty: 90 TABLET | Refills: 2 | OUTPATIENT
Start: 2018-11-14

## 2018-11-27 ENCOUNTER — HOSPITAL ENCOUNTER (OUTPATIENT)
Dept: RADIOLOGY | Facility: CLINIC | Age: 43
Discharge: HOME | End: 2018-11-27
Payer: MEDICARE

## 2018-11-27 VITALS
SYSTOLIC BLOOD PRESSURE: 111 MMHG | RESPIRATION RATE: 18 BRPM | OXYGEN SATURATION: 98 % | HEART RATE: 52 BPM | TEMPERATURE: 98.5 F | DIASTOLIC BLOOD PRESSURE: 62 MMHG

## 2018-11-27 DIAGNOSIS — M46.1 SACROILIITIS (HCC): ICD-10-CM

## 2018-11-27 PROCEDURE — 27096 INJECT SACROILIAC JOINT: CPT | Performed by: ANESTHESIOLOGY

## 2018-11-27 RX ORDER — LIDOCAINE HYDROCHLORIDE 10 MG/ML
5 INJECTION, SOLUTION EPIDURAL; INFILTRATION; INTRACAUDAL; PERINEURAL ONCE
Status: COMPLETED | OUTPATIENT
Start: 2018-11-27 | End: 2018-11-27

## 2018-11-27 RX ORDER — METHYLPREDNISOLONE ACETATE 80 MG/ML
80 INJECTION, SUSPENSION INTRA-ARTICULAR; INTRALESIONAL; INTRAMUSCULAR; PARENTERAL; SOFT TISSUE ONCE
Status: COMPLETED | OUTPATIENT
Start: 2018-11-27 | End: 2018-11-27

## 2018-11-27 RX ORDER — BUPIVACAINE HCL/PF 2.5 MG/ML
30 VIAL (ML) INJECTION ONCE
Status: COMPLETED | OUTPATIENT
Start: 2018-11-27 | End: 2018-11-27

## 2018-11-27 RX ADMIN — METHYLPREDNISOLONE ACETATE 80 MG: 80 INJECTION, SUSPENSION INTRA-ARTICULAR; INTRALESIONAL; INTRAMUSCULAR; SOFT TISSUE at 08:20

## 2018-11-27 RX ADMIN — IOHEXOL 1 ML: 300 INJECTION, SOLUTION INTRAVENOUS at 08:18

## 2018-11-27 RX ADMIN — LIDOCAINE HYDROCHLORIDE 4 ML: 10 INJECTION, SOLUTION EPIDURAL; INFILTRATION; INTRACAUDAL; PERINEURAL at 08:17

## 2018-11-27 RX ADMIN — BUPIVACAINE HYDROCHLORIDE 4 ML: 2.5 INJECTION, SOLUTION EPIDURAL; INFILTRATION; INTRACAUDAL at 08:20

## 2018-11-27 NOTE — H&P
History of Present Illness: The patient is a 39 y o  female who presents with complaints of lumbosacral back pain  Patient Active Problem List   Diagnosis    Acid reflux disease    Aftercare following surgery of the musculoskeletal system    Allergic rhinitis    Analgesic use    Avulsion fracture of middle phalanx of finger, closed, initial encounter    Bilateral hand pain    Cervical radiculopathy    Chronic cervical pain    Chronic cough    Chronic myofascial pain    Closed displaced fracture of proximal phalanx of left ring finger    Cold intolerance    Contracture of finger joint, left    Luetscher's syndrome    Eczema    Encounter for long-term opiate analgesic use    Esophageal reflux    Fatigue    Finger fracture, left    Finger pain    Gastroenteritis    Generalized anxiety disorder    Hearing loss    Impacted cerumen of right ear    Irritable bowel syndrome    Non-toxic uninodular goiter    Opioid dependence (HCC)    Pain in right shoulder    Pain syndrome, chronic    Painful orthopaedic hardware (HCC)    Paresthesias/numbness    Excessive thirst    Stiffness of finger joint of left hand    Suprascapular neuropathy    Disorder of bursae and tendons in shoulder region    Thyroid nodule    Xerostomia due to mouth breathing    Neck pain - Bilateral    Chronic bilateral low back pain without sciatica    Chronic pain syndrome - Bilateral    Myofascial pain syndrome - Bilateral    Chronic diarrhea    Cervicalgia    Myofascial pain    Chronic left shoulder pain       Past Medical History:   Diagnosis Date    Anxiety     Arthritis     Right shoulder    Chronic pain disorder     Bilateral shoulders    Depression     Fibroid May 14,2018    Removed in office    biopsy normal    Thyroid nodule     left side    Urinary tract infection     Since a child       Past Surgical History:   Procedure Laterality Date    APPENDECTOMY      DENTAL SURGERY      Dental implants  ORIF FINGER FRACTURE Left 6/7/2016    Procedure: RING FINGER PROXIMAL PHALANX FRACTURE O/R I/F ;  Surgeon: Bohdan Rinne, MD;  [facility];  Service:     [place] OPEN TX PHALANGEAL SHAFT FRACTURE PROX/MIDDLE EA Left 10/12/2016    Procedure: RING FINGER MIDDLE PHALANX FRACTURE OPEN REDUCTION W/ INTERNAL FIXATION, AND CONTRACTURE RELEASE OF PIP JOINT ;  Surgeon: Bohdan Rinne, MD;  [facility];  Service: Orthopedics    [place] REMOVAL DEEP IMPLANT Left 10/26/2017    Procedure: RING FINGER HARDWARE REMOVAL;  Surgeon: Gisselle Ochoa MD;  [facility];  Service: Orthopedics    SHOULDER SURGERY      bialt    WISDOM TOOTH EXTRACTION Bilateral          Current Outpatient Prescriptions:     baclofen 10 mg tablet, baclofen 10 mg tablet, Disp: , Rfl:     BUTRANS 10 MCG/HR PTWK, Place 1 patch on the skin once a week, Disp: 4 patch, Rfl: 2    cholecalciferol (VITAMIN D3) 1,000 units tablet, Take 1,000 Units by mouth daily, Disp: , Rfl:     clonazePAM (KlonoPIN) 1 mg tablet, Take 1 mg by mouth 4 (four) times a day as needed, Disp: , Rfl:     esomeprazole (NexIUM) 40 MG capsule, Take 40 mg by mouth daily, Disp: , Rfl: 5    famotidine (PEPCID) 20 mg tablet, TAKE 1 TABLET (20 MG TOTAL) BY MOUTH DAILY AT BEDTIME, Disp: 30 tablet, Rfl: 3    fluticasone (FLONASE) 50 mcg/act nasal spray, 2 sprays into each nostril daily, Disp: 3 Bottle, Rfl: 0    IRON PO, Take by mouth daily, Disp: , Rfl:     levocetirizine (XYZAL) 5 MG tablet, Take 1 tablet (5 mg total) by mouth every evening, Disp: 30 tablet, Rfl: 2    multivitamin (THERAGRAN) TABS, Take 1 tablet by mouth daily, Disp: , Rfl:     pregabalin (LYRICA) 75 mg capsule, 1 PO AM and Noon, 2 PO QHS, Disp: 120 capsule, Rfl: 2    tiZANidine (ZANAFLEX) 2 mg tablet, Take 1-2 PO TID PRN, Disp: 90 tablet, Rfl: 2    triamcinolone (KENALOG) 0 5 % cream, Apply 30 application topically 3 (three) times a day, Disp: , Rfl:     venlafaxine (EFFEXOR XR) 75 mg 24 hr capsule, Take 1 capsule by mouth daily, Disp: , Rfl:     zolpidem (AMBIEN) 10 mg tablet, Take 10 mg by mouth daily at bedtime as needed for sleep, Disp: , Rfl:     Allergies   Allergen Reactions    Naproxen GI Intolerance and Nausea Only    Vancomycin Hives and Itching    Hydrocodone-Acetaminophen Hives     judie tylenol/past    Nsaids Other (See Comments)     ultram and naprosen       Physical Exam:   Vitals:    11/27/18 0807   BP: 95/56   Pulse: (!) 51   Resp: 18   Temp: 98 5 °F (36 9 °C)   SpO2: 98%     General: Awake, Alert, Oriented x 3, Mood and affect appropriate  Respiratory: Respirations even and unlabored  Cardiovascular: Peripheral pulses intact; no edema  Musculoskeletal Exam:  Bilateral SI joints tender to palpation  ASA Score: 2    Patient/Chart Verification  Patient ID Verified: Verbal  ID Band Applied: No  Consents Confirmed: Procedural  H&P( within 30 days) Verified: To be obtained in the Pre-Procedure area  Interval H&P(within 24 hr) Complete (required for Outpatients and Surgery Admit only): To be obtained in the Pre-Procedure area  Allergies Reviewed: Yes  Anticoag/NSAID held?: No  Currently on antibiotics?: No  Pregnancy denied?: Yes  Pre-op Lab/Test Results Available: N/A  Pregnancy Lab Collected: N/A comment    Assessment:   1   Sacroiliitis (HCC)        Plan: B/L SIJ injection

## 2018-11-27 NOTE — DISCHARGE INSTRUCTIONS
Steroid Joint Injection   WHAT YOU NEED TO KNOW:   A steroid joint injection is a procedure to inject steroid medicine into a joint  Steroid medicine decreases pain and inflammation  The injection may also contain an anesthetic (numbing medicine) to decrease pain  It may be done to treat conditions such as arthritis, gout, or carpal tunnel syndrome  The injections may be given in your knee, ankle, shoulder, elbow, wrist, ankle or sacroiliac joint  1  Do not apply heat to any area that is numb  If you have discomfort or soreness at the injection site, you may apply ice today, 20 minutes on and 20 minutes off  Tomorrow you may use ice or warm, moist heat  Do not apply ice or heat directly to the skin  2  You may have an increase or change in the discomfort for 36-48 hours after your treatment  Apply ice and continue with any pain medicine you have been prescribed  3  Do not do anything strenuous today  You may shower, but no tub baths or hot tubs today  You may resume your normal activities tomorrow, but do not overdo it  Resume normal activities slowly when you are feeling better  4  If you experience redness, drainage or swelling at the injection site, or if you develop a fever above 100 degrees, please call [facility] at [phone] or go to the Emergency Room  5  Continue to take all routine medicines prescribed by your primary care physician unless otherwise instructed by our staff  Most blood thinners should be started again according to your regularly scheduled dosing  If you have any questions, please give our office a call  If you have a problem specifically related to your procedure, please call our office at [phone]  Problems not related to your procedure should be directed to your primary care physician

## 2018-12-09 DIAGNOSIS — K21.9 GASTROESOPHAGEAL REFLUX DISEASE WITHOUT ESOPHAGITIS: ICD-10-CM

## 2018-12-12 RX ORDER — ESOMEPRAZOLE MAGNESIUM 40 MG/1
CAPSULE, DELAYED RELEASE ORAL
Qty: 30 CAPSULE | Refills: 1 | Status: SHIPPED | OUTPATIENT
Start: 2018-12-12 | End: 2019-04-23 | Stop reason: SDUPTHER

## 2018-12-29 DIAGNOSIS — J30.1 ALLERGIC RHINITIS DUE TO POLLEN, UNSPECIFIED SEASONALITY: ICD-10-CM

## 2018-12-30 RX ORDER — LEVOCETIRIZINE DIHYDROCHLORIDE 5 MG/1
TABLET, FILM COATED ORAL
Qty: 30 TABLET | Refills: 2 | Status: SHIPPED | OUTPATIENT
Start: 2018-12-30 | End: 2019-05-16 | Stop reason: SDUPTHER

## 2019-01-24 ENCOUNTER — TELEPHONE (OUTPATIENT)
Dept: PAIN MEDICINE | Facility: CLINIC | Age: 44
End: 2019-01-24

## 2019-01-24 NOTE — TELEPHONE ENCOUNTER
Please call the patient and let her know that we received notification from insurance monitoring program that the patient has been receiving controlled substances from multiple providers, specifically Oxycodone, Tylenol #3, and Tramadol on top of the Butrans patch that we are prescribing  Can the patient explain this? This will be her first and final warning as this is a breach of the opioid contract the patient has signed with our office that states she is only to receive opioid medication from our office  We understand if the patient had a trauma/surgery but we need to be made aware of this  Thank you

## 2019-04-23 ENCOUNTER — OFFICE VISIT (OUTPATIENT)
Dept: FAMILY MEDICINE CLINIC | Facility: CLINIC | Age: 44
End: 2019-04-23
Payer: MEDICARE

## 2019-04-23 VITALS
DIASTOLIC BLOOD PRESSURE: 78 MMHG | HEART RATE: 72 BPM | RESPIRATION RATE: 14 BRPM | TEMPERATURE: 99.2 F | HEIGHT: 63 IN | SYSTOLIC BLOOD PRESSURE: 116 MMHG | WEIGHT: 118.8 LBS | BODY MASS INDEX: 21.05 KG/M2

## 2019-04-23 DIAGNOSIS — R53.83 FATIGUE, UNSPECIFIED TYPE: ICD-10-CM

## 2019-04-23 DIAGNOSIS — K21.9 GASTROESOPHAGEAL REFLUX DISEASE WITHOUT ESOPHAGITIS: ICD-10-CM

## 2019-04-23 DIAGNOSIS — J01.90 ACUTE NON-RECURRENT SINUSITIS, UNSPECIFIED LOCATION: Primary | ICD-10-CM

## 2019-04-23 DIAGNOSIS — K52.9 GASTROENTERITIS: ICD-10-CM

## 2019-04-23 DIAGNOSIS — E55.9 VITAMIN D DEFICIENCY: ICD-10-CM

## 2019-04-23 DIAGNOSIS — M25.551 PAIN OF RIGHT HIP JOINT: ICD-10-CM

## 2019-04-23 DIAGNOSIS — K21.9 CHRONIC GERD: ICD-10-CM

## 2019-04-23 DIAGNOSIS — N92.6 IRREGULAR MENSES: ICD-10-CM

## 2019-04-23 PROCEDURE — 99214 OFFICE O/P EST MOD 30 MIN: CPT | Performed by: NURSE PRACTITIONER

## 2019-04-23 RX ORDER — FAMOTIDINE 20 MG/1
20 TABLET, FILM COATED ORAL
Qty: 90 TABLET | Refills: 0 | Status: SHIPPED | OUTPATIENT
Start: 2019-04-23 | End: 2019-06-25 | Stop reason: SDUPTHER

## 2019-04-23 RX ORDER — DOXYCYCLINE 100 MG/1
100 CAPSULE ORAL 2 TIMES DAILY
Qty: 14 CAPSULE | Refills: 0 | Status: SHIPPED | OUTPATIENT
Start: 2019-04-23 | End: 2019-04-30

## 2019-04-23 RX ORDER — ESOMEPRAZOLE MAGNESIUM 40 MG/1
40 CAPSULE, DELAYED RELEASE ORAL DAILY
Qty: 90 CAPSULE | Refills: 0 | Status: SHIPPED | OUTPATIENT
Start: 2019-04-23 | End: 2019-07-15 | Stop reason: SDUPTHER

## 2019-05-16 DIAGNOSIS — J30.1 ALLERGIC RHINITIS DUE TO POLLEN, UNSPECIFIED SEASONALITY: ICD-10-CM

## 2019-05-16 RX ORDER — LEVOCETIRIZINE DIHYDROCHLORIDE 5 MG/1
TABLET, FILM COATED ORAL
Qty: 30 TABLET | Refills: 1 | Status: SHIPPED | OUTPATIENT
Start: 2019-05-16 | End: 2019-06-26 | Stop reason: SDUPTHER

## 2019-06-25 DIAGNOSIS — K21.9 CHRONIC GERD: ICD-10-CM

## 2019-06-25 RX ORDER — FAMOTIDINE 20 MG/1
20 TABLET, FILM COATED ORAL
Qty: 90 TABLET | Refills: 0 | Status: SHIPPED | OUTPATIENT
Start: 2019-06-25 | End: 2019-12-13 | Stop reason: SDUPTHER

## 2019-06-26 DIAGNOSIS — J30.1 ALLERGIC RHINITIS DUE TO POLLEN, UNSPECIFIED SEASONALITY: ICD-10-CM

## 2019-06-26 RX ORDER — LEVOCETIRIZINE DIHYDROCHLORIDE 5 MG/1
TABLET, FILM COATED ORAL
Qty: 30 TABLET | Refills: 1 | Status: SHIPPED | OUTPATIENT
Start: 2019-06-26 | End: 2019-07-19 | Stop reason: SDUPTHER

## 2019-07-11 ENCOUNTER — TELEPHONE (OUTPATIENT)
Dept: FAMILY MEDICINE CLINIC | Facility: CLINIC | Age: 44
End: 2019-07-11

## 2019-07-11 DIAGNOSIS — Z12.31 SCREENING MAMMOGRAM, ENCOUNTER FOR: Primary | ICD-10-CM

## 2019-07-15 DIAGNOSIS — K21.9 GASTROESOPHAGEAL REFLUX DISEASE WITHOUT ESOPHAGITIS: ICD-10-CM

## 2019-07-15 RX ORDER — ESOMEPRAZOLE MAGNESIUM 40 MG/1
CAPSULE, DELAYED RELEASE ORAL
Qty: 90 CAPSULE | Refills: 0 | Status: SHIPPED | OUTPATIENT
Start: 2019-07-15 | End: 2019-12-13 | Stop reason: SDUPTHER

## 2019-07-19 DIAGNOSIS — J30.1 ALLERGIC RHINITIS DUE TO POLLEN, UNSPECIFIED SEASONALITY: ICD-10-CM

## 2019-07-19 RX ORDER — LEVOCETIRIZINE DIHYDROCHLORIDE 5 MG/1
TABLET, FILM COATED ORAL
Qty: 30 TABLET | Refills: 1 | Status: SHIPPED | OUTPATIENT
Start: 2019-07-19 | End: 2019-08-13 | Stop reason: SDUPTHER

## 2019-08-13 DIAGNOSIS — J30.1 ALLERGIC RHINITIS DUE TO POLLEN, UNSPECIFIED SEASONALITY: ICD-10-CM

## 2019-08-13 RX ORDER — LEVOCETIRIZINE DIHYDROCHLORIDE 5 MG/1
TABLET, FILM COATED ORAL
Qty: 30 TABLET | Refills: 1 | Status: SHIPPED | OUTPATIENT
Start: 2019-08-13 | End: 2019-08-28 | Stop reason: SDUPTHER

## 2019-08-28 DIAGNOSIS — J30.1 ALLERGIC RHINITIS DUE TO POLLEN, UNSPECIFIED SEASONALITY: ICD-10-CM

## 2019-08-28 RX ORDER — LEVOCETIRIZINE DIHYDROCHLORIDE 5 MG/1
5 TABLET, FILM COATED ORAL EVERY EVENING
Qty: 90 TABLET | Refills: 3 | Status: SHIPPED | OUTPATIENT
Start: 2019-08-28

## 2019-12-08 DIAGNOSIS — K21.9 CHRONIC GERD: ICD-10-CM

## 2019-12-09 RX ORDER — FAMOTIDINE 20 MG/1
20 TABLET, FILM COATED ORAL
Qty: 90 TABLET | Refills: 0 | OUTPATIENT
Start: 2019-12-09

## 2019-12-10 DIAGNOSIS — K21.9 GASTROESOPHAGEAL REFLUX DISEASE WITHOUT ESOPHAGITIS: ICD-10-CM

## 2019-12-10 RX ORDER — ESOMEPRAZOLE MAGNESIUM 40 MG/1
CAPSULE, DELAYED RELEASE ORAL
Qty: 90 CAPSULE | Refills: 0 | OUTPATIENT
Start: 2019-12-10

## 2019-12-12 ENCOUNTER — TELEPHONE (OUTPATIENT)
Dept: FAMILY MEDICINE CLINIC | Facility: CLINIC | Age: 44
End: 2019-12-12

## 2019-12-12 NOTE — TELEPHONE ENCOUNTER
Patient's mom called requesting RX's to be sent to [facility] in [facility] till she can see a new primary care doctor in January    Esomeprazole  Famotidine  Fluticasone  Levocetrizine    Please advise patient at [phone]

## 2019-12-13 DIAGNOSIS — J30.1 ALLERGIC RHINITIS DUE TO POLLEN, UNSPECIFIED SEASONALITY: ICD-10-CM

## 2019-12-13 DIAGNOSIS — K21.9 CHRONIC GERD: ICD-10-CM

## 2019-12-13 DIAGNOSIS — K21.9 GASTROESOPHAGEAL REFLUX DISEASE WITHOUT ESOPHAGITIS: ICD-10-CM

## 2019-12-13 RX ORDER — ESOMEPRAZOLE MAGNESIUM 40 MG/1
40 CAPSULE, DELAYED RELEASE ORAL DAILY
Qty: 90 CAPSULE | Refills: 0 | Status: SHIPPED | OUTPATIENT
Start: 2019-12-13

## 2019-12-13 RX ORDER — FLUTICASONE PROPIONATE 50 MCG
2 SPRAY, SUSPENSION (ML) NASAL DAILY
Qty: 3 BOTTLE | Refills: 0 | Status: SHIPPED | OUTPATIENT
Start: 2019-12-13

## 2019-12-13 RX ORDER — FAMOTIDINE 20 MG/1
20 TABLET, FILM COATED ORAL
Qty: 90 TABLET | Refills: 0 | Status: SHIPPED | OUTPATIENT
Start: 2019-12-13

## 2019-12-13 NOTE — TELEPHONE ENCOUNTER
Elham, would you mind sending prescriptions for the time being, since you were following up with patient lately?   Thank you

## 2019-12-13 NOTE — TELEPHONE ENCOUNTER
Prescriptions were sent for Nexium, Pepcid, and Flonase to [facility] in [place]  Levocetirizine prescription was sent on August 28th for 90 day supply, with 3 refills    She should still have refills for this medication

## 2020-01-09 DIAGNOSIS — Z12.31 ENCOUNTER FOR SCREENING MAMMOGRAM FOR MALIGNANT NEOPLASM OF BREAST: ICD-10-CM

## 2020-03-06 DIAGNOSIS — J30.1 ALLERGIC RHINITIS DUE TO POLLEN, UNSPECIFIED SEASONALITY: ICD-10-CM

## 2020-03-06 RX ORDER — FLUTICASONE PROPIONATE 50 MCG
SPRAY, SUSPENSION (ML) NASAL
Qty: 48 ML | Refills: 0 | OUTPATIENT
Start: 2020-03-06

## 2020-03-13 DIAGNOSIS — K21.9 GASTROESOPHAGEAL REFLUX DISEASE WITHOUT ESOPHAGITIS: ICD-10-CM

## 2020-03-13 DIAGNOSIS — K21.9 CHRONIC GERD: ICD-10-CM

## 2020-03-13 RX ORDER — FAMOTIDINE 20 MG/1
TABLET, FILM COATED ORAL
Qty: 90 TABLET | Refills: 0 | OUTPATIENT
Start: 2020-03-13

## 2020-03-13 RX ORDER — ESOMEPRAZOLE MAGNESIUM 40 MG/1
CAPSULE, DELAYED RELEASE ORAL
Qty: 90 CAPSULE | Refills: 0 | OUTPATIENT
Start: 2020-03-13

## 2020-06-18 DIAGNOSIS — K21.9 CHRONIC GERD: ICD-10-CM

## 2020-06-18 RX ORDER — FAMOTIDINE 20 MG/1
TABLET, FILM COATED ORAL
Qty: 90 TABLET | Refills: 0 | OUTPATIENT
Start: 2020-06-18

## 2020-07-17 DIAGNOSIS — K21.9 CHRONIC GERD: ICD-10-CM

## 2020-07-17 RX ORDER — FAMOTIDINE 20 MG/1
TABLET, FILM COATED ORAL
Qty: 90 TABLET | Refills: 0 | OUTPATIENT
Start: 2020-07-17

## 2021-04-06 ENCOUNTER — TELEPHONE (OUTPATIENT)
Dept: FAMILY MEDICINE CLINIC | Facility: CLINIC | Age: 46
End: 2021-04-06

## 2021-04-06 NOTE — TELEPHONE ENCOUNTER
Called pt- N/A- LM to please call back and schedule apt w/ PCP- Last OV was on 04/23/2019  Please let Leonora know if pt has a new PCP or has relocated for patient attribution workflow

## 2021-04-06 NOTE — LETTER
April 12, 2021      [address]  [address]  1975       Dear Alexa Miranda,      Our staff is committed to providing our patients with quality health care  We consider it a privilege to be your healthcare provider        ________ We received a refill request for your medication  A refill was authorized for a 30 day supply  However, you are past due for a follow up appointment  Please make sure you schedule an appointment with your primary care physician within the next month  No further refills will be authorized unless you come in for a follow up        ___X____ We have reviewed your medical record and it indicates that your last annual well exam / office visit was on 04/23/2019  Routine follow ups and appointments are necessary for continuity of care and ongoing management of your health  As per your providers request, please contact our office to schedule an appointment with him/her  Our telephone # is [phone]       ____X___ If you are receiving medical care with another Primary Care Physician/Medical Practice, please contact our office so we may update your medical record to reflect new Primary Care Physician/Provider  Sincerely,     Baldo Mauricio, Dr Leonides Mohs, BRIJESH Crow

## 2021-04-09 NOTE — TELEPHONE ENCOUNTER
2nd Call  Called pt- N/A- LM to please call back and schedule apt w/ PCP- Last OV was on 4/23/2019  Please let Leonora know if pt has a new PCP or has relocated for patient attribution workflow

## 2021-04-12 NOTE — TELEPHONE ENCOUNTER
3rd call  Called pt- N/A- LM to please call back and schedule apt w/ PCP- Last OV was on 04/23/2019  Letter mailed to pt's home

## 2023-03-27 NOTE — TELEPHONE ENCOUNTER
Lab has said they do not perform H  Pylori tests through blood anymore  AGAIN, I recommend she see GI for f/u or any other ?'s she may have   If doesn't have GI refer to [facility]

## (undated) DEVICE — SPONGE PVP SCRUB WING STERILE

## (undated) DEVICE — GLOVE SRG BIOGEL 7

## (undated) DEVICE — SUT PROLENE 4-0 PC-3 18 IN 8634G

## (undated) DEVICE — GLOVE INDICATOR PI UNDERGLOVE SZ 8.5 BLUE

## (undated) DEVICE — DRAPE C-ARM 48 X 84 IN F/ OEC MINIVIEW 6800

## (undated) DEVICE — SUT MONOCRYL 4-0 PS-2 27 IN Y426H

## (undated) DEVICE — GLOVE INDICATOR PI UNDERGLOVE SZ 8 BLUE

## (undated) DEVICE — PACK PLASTIC HAND PBDS

## (undated) DEVICE — INTENDED FOR TISSUE SEPARATION, AND OTHER PROCEDURES THAT REQUIRE A SHARP SURGICAL BLADE TO PUNCTURE OR CUT.: Brand: BARD-PARKER SAFETY BLADES SIZE 15, STERILE

## (undated) DEVICE — CHLORAPREP HI-LITE 26ML ORANGE

## (undated) DEVICE — GLOVE SRG BIOGEL 7.5

## (undated) DEVICE — GLOVE INDICATOR PI UNDERGLOVE SZ 7 BLUE

## (undated) DEVICE — GLOVE SRG BIOGEL 8